# Patient Record
Sex: MALE | Race: WHITE | Employment: FULL TIME | URBAN - NONMETROPOLITAN AREA
[De-identification: names, ages, dates, MRNs, and addresses within clinical notes are randomized per-mention and may not be internally consistent; named-entity substitution may affect disease eponyms.]

---

## 2017-01-26 ENCOUNTER — TELEPHONE (OUTPATIENT)
Dept: NEUROSURGERY | Age: 47
End: 2017-01-26

## 2017-01-27 DIAGNOSIS — M54.2 CERVICAL PAIN: ICD-10-CM

## 2017-01-27 RX ORDER — HYDROCODONE BITARTRATE AND ACETAMINOPHEN 7.5; 325 MG/1; MG/1
1 TABLET ORAL EVERY 8 HOURS PRN
Qty: 15 TABLET | Refills: 0 | Status: SHIPPED | OUTPATIENT
Start: 2017-01-27 | End: 2017-01-31 | Stop reason: SDUPTHER

## 2017-01-31 ENCOUNTER — OFFICE VISIT (OUTPATIENT)
Dept: NEUROSURGERY | Age: 47
End: 2017-01-31
Payer: COMMERCIAL

## 2017-01-31 VITALS
HEART RATE: 91 BPM | WEIGHT: 173 LBS | BODY MASS INDEX: 24.22 KG/M2 | SYSTOLIC BLOOD PRESSURE: 132 MMHG | DIASTOLIC BLOOD PRESSURE: 80 MMHG | HEIGHT: 71 IN | OXYGEN SATURATION: 96 %

## 2017-01-31 DIAGNOSIS — M50.30 DDD (DEGENERATIVE DISC DISEASE), CERVICAL: ICD-10-CM

## 2017-01-31 DIAGNOSIS — M54.2 NECK PAIN: ICD-10-CM

## 2017-01-31 DIAGNOSIS — Z98.1 S/P CERVICAL SPINAL FUSION: ICD-10-CM

## 2017-01-31 DIAGNOSIS — M47.22 CERVICAL SPONDYLOSIS WITH RADICULOPATHY: ICD-10-CM

## 2017-01-31 DIAGNOSIS — M54.2 CERVICAL PAIN: ICD-10-CM

## 2017-01-31 PROCEDURE — 99214 OFFICE O/P EST MOD 30 MIN: CPT | Performed by: NEUROLOGICAL SURGERY

## 2017-01-31 RX ORDER — HYDROCODONE BITARTRATE AND ACETAMINOPHEN 7.5; 325 MG/1; MG/1
1 TABLET ORAL EVERY 8 HOURS PRN
Qty: 30 TABLET | Refills: 0 | Status: SHIPPED | OUTPATIENT
Start: 2017-01-31 | End: 2017-02-20 | Stop reason: SDUPTHER

## 2017-02-15 ENCOUNTER — HOSPITAL ENCOUNTER (OUTPATIENT)
Dept: GENERAL RADIOLOGY | Age: 47
Discharge: HOME | End: 2017-02-15
Payer: COMMERCIAL

## 2017-02-15 ENCOUNTER — HOSPITAL ENCOUNTER (OUTPATIENT)
Dept: PREADMISSION TESTING | Age: 47
Discharge: HOME | End: 2017-02-15
Payer: COMMERCIAL

## 2017-02-15 VITALS — WEIGHT: 166 LBS | HEIGHT: 71 IN | BODY MASS INDEX: 23.24 KG/M2

## 2017-02-15 DIAGNOSIS — M47.22 CERVICAL SPONDYLOSIS WITH RADICULOPATHY: ICD-10-CM

## 2017-02-15 LAB
ALBUMIN SERPL-MCNC: 4.2 G/DL (ref 3.5–5.2)
ALP BLD-CCNC: 81 U/L (ref 40–130)
ALT SERPL-CCNC: 10 U/L (ref 5–41)
ANION GAP SERPL CALCULATED.3IONS-SCNC: 15 MMOL/L (ref 7–19)
APTT: 34.7 SEC (ref 26–36.2)
AST SERPL-CCNC: 13 U/L (ref 5–40)
BILIRUB SERPL-MCNC: 0.4 MG/DL (ref 0.2–1.2)
BILIRUBIN URINE: NEGATIVE
BLOOD, URINE: NEGATIVE
BUN BLDV-MCNC: 5 MG/DL (ref 6–20)
CALCIUM SERPL-MCNC: 9.7 MG/DL (ref 8.6–10)
CHLORIDE BLD-SCNC: 103 MMOL/L (ref 98–111)
CLARITY: CLEAR
CO2: 23 MMOL/L (ref 22–29)
COLOR: YELLOW
CREAT SERPL-MCNC: 0.7 MG/DL (ref 0.5–1.2)
EKG P AXIS: 59 DEGREES
EKG P-R INTERVAL: 120 MS
EKG Q-T INTERVAL: 354 MS
EKG QRS DURATION: 88 MS
EKG QTC CALCULATION (BAZETT): 394 MS
EKG T AXIS: 52 DEGREES
GFR NON-AFRICAN AMERICAN: >60
GLOBULIN: 3.1 G/DL
GLUCOSE BLD-MCNC: 87 MG/DL (ref 74–109)
GLUCOSE URINE: NEGATIVE MG/DL
HCT VFR BLD CALC: 45.4 % (ref 42–52)
HEMOGLOBIN: 15.3 G/DL (ref 14–18)
INR BLD: 0.92 (ref 0.88–1.18)
KETONES, URINE: NEGATIVE MG/DL
LEUKOCYTE ESTERASE, URINE: NEGATIVE
MCH RBC QN AUTO: 30.6 PG (ref 27–31)
MCHC RBC AUTO-ENTMCNC: 33.7 G/DL (ref 33–37)
MCV RBC AUTO: 90.8 FL (ref 80–94)
NITRITE, URINE: NEGATIVE
PDW BLD-RTO: 13.4 % (ref 11.5–14.5)
PH UA: 6
PLATELET # BLD: 299 K/UL (ref 130–400)
PMV BLD AUTO: 10.3 FL (ref 7.4–10.4)
POTASSIUM SERPL-SCNC: 4 MMOL/L (ref 3.5–5)
PROTEIN UA: NEGATIVE MG/DL
PROTHROMBIN TIME: 12.4 SEC (ref 12–14.6)
RBC # BLD: 5 M/UL (ref 4.7–6.1)
SODIUM BLD-SCNC: 141 MMOL/L (ref 136–145)
SPECIFIC GRAVITY UA: 1.01
TOTAL PROTEIN: 7.3 G/DL (ref 6.6–8.7)
UROBILINOGEN, URINE: 0.2 E.U./DL
WBC # BLD: 8.5 K/UL (ref 4.8–10.8)

## 2017-02-15 PROCEDURE — 80053 COMPREHEN METABOLIC PANEL: CPT

## 2017-02-15 PROCEDURE — 85730 THROMBOPLASTIN TIME PARTIAL: CPT

## 2017-02-15 PROCEDURE — 71020 XR CHEST STANDARD TWO VW: CPT

## 2017-02-15 PROCEDURE — 81003 URINALYSIS AUTO W/O SCOPE: CPT

## 2017-02-15 PROCEDURE — 85027 COMPLETE CBC AUTOMATED: CPT

## 2017-02-15 PROCEDURE — 85610 PROTHROMBIN TIME: CPT

## 2017-02-15 PROCEDURE — 93005 ELECTROCARDIOGRAM TRACING: CPT

## 2017-02-16 ENCOUNTER — TELEPHONE (OUTPATIENT)
Dept: NEUROSURGERY | Age: 47
End: 2017-02-16

## 2017-02-20 DIAGNOSIS — M54.2 CERVICAL PAIN: ICD-10-CM

## 2017-02-20 RX ORDER — HYDROCODONE BITARTRATE AND ACETAMINOPHEN 7.5; 325 MG/1; MG/1
1 TABLET ORAL EVERY 8 HOURS PRN
Qty: 30 TABLET | Refills: 0 | Status: ON HOLD | OUTPATIENT
Start: 2017-02-20 | End: 2017-03-02 | Stop reason: HOSPADM

## 2017-03-01 ENCOUNTER — ANESTHESIA EVENT (OUTPATIENT)
Dept: OPERATING ROOM | Age: 47
DRG: 473 | End: 2017-03-01
Payer: COMMERCIAL

## 2017-03-01 ENCOUNTER — HOSPITAL ENCOUNTER (INPATIENT)
Age: 47
LOS: 1 days | Discharge: HOME | DRG: 473 | End: 2017-03-02
Attending: NEUROLOGICAL SURGERY | Admitting: NEUROLOGICAL SURGERY
Payer: COMMERCIAL

## 2017-03-01 ENCOUNTER — ANESTHESIA (OUTPATIENT)
Dept: OPERATING ROOM | Age: 47
DRG: 473 | End: 2017-03-01
Payer: COMMERCIAL

## 2017-03-01 ENCOUNTER — APPOINTMENT (OUTPATIENT)
Dept: GENERAL RADIOLOGY | Age: 47
DRG: 473 | End: 2017-03-01
Attending: NEUROLOGICAL SURGERY
Payer: COMMERCIAL

## 2017-03-01 ENCOUNTER — SURGERY (OUTPATIENT)
Age: 47
End: 2017-03-01

## 2017-03-01 VITALS
DIASTOLIC BLOOD PRESSURE: 85 MMHG | RESPIRATION RATE: 13 BRPM | TEMPERATURE: 98.2 F | SYSTOLIC BLOOD PRESSURE: 150 MMHG | OXYGEN SATURATION: 100 %

## 2017-03-01 LAB
ABO/RH: NORMAL
ANTIBODY SCREEN: NORMAL

## 2017-03-01 PROCEDURE — 22853 INSJ BIOMECHANICAL DEVICE: CPT | Performed by: NEUROLOGICAL SURGERY

## 2017-03-01 PROCEDURE — 20936 SP BONE AGRFT LOCAL ADD-ON: CPT | Performed by: NEUROLOGICAL SURGERY

## 2017-03-01 PROCEDURE — 2720000000 HC MISC SURG SUPPLY STERILE $0-50: Performed by: NEUROLOGICAL SURGERY

## 2017-03-01 PROCEDURE — 3600000005 HC SURGERY LEVEL 5 BASE: Performed by: NEUROLOGICAL SURGERY

## 2017-03-01 PROCEDURE — 86901 BLOOD TYPING SEROLOGIC RH(D): CPT

## 2017-03-01 PROCEDURE — 2700000000 HC OXYGEN THERAPY PER DAY

## 2017-03-01 PROCEDURE — L8699 PROSTHETIC IMPLANT NOS: HCPCS | Performed by: NEUROLOGICAL SURGERY

## 2017-03-01 PROCEDURE — 6370000000 HC RX 637 (ALT 250 FOR IP): Performed by: NEUROLOGICAL SURGERY

## 2017-03-01 PROCEDURE — 86850 RBC ANTIBODY SCREEN: CPT

## 2017-03-01 PROCEDURE — 2720000001 HC MISC SURG SUPPLY STERILE $51-500: Performed by: NEUROLOGICAL SURGERY

## 2017-03-01 PROCEDURE — 36415 COLL VENOUS BLD VENIPUNCTURE: CPT

## 2017-03-01 PROCEDURE — C1729 CATH, DRAINAGE: HCPCS | Performed by: NEUROLOGICAL SURGERY

## 2017-03-01 PROCEDURE — 6360000002 HC RX W HCPCS

## 2017-03-01 PROCEDURE — 2580000003 HC RX 258: Performed by: NEUROLOGICAL SURGERY

## 2017-03-01 PROCEDURE — 72040 X-RAY EXAM NECK SPINE 2-3 VW: CPT

## 2017-03-01 PROCEDURE — 2580000003 HC RX 258

## 2017-03-01 PROCEDURE — 1210000000 HC MED SURG R&B

## 2017-03-01 PROCEDURE — 94664 DEMO&/EVAL PT USE INHALER: CPT

## 2017-03-01 PROCEDURE — 7100000000 HC PACU RECOVERY - FIRST 15 MIN: Performed by: NEUROLOGICAL SURGERY

## 2017-03-01 PROCEDURE — 2500000003 HC RX 250 WO HCPCS

## 2017-03-01 PROCEDURE — 22845 INSERT SPINE FIXATION DEVICE: CPT | Performed by: NEUROLOGICAL SURGERY

## 2017-03-01 PROCEDURE — 6360000002 HC RX W HCPCS: Performed by: ANESTHESIOLOGY

## 2017-03-01 PROCEDURE — 6360000002 HC RX W HCPCS: Performed by: NEUROLOGICAL SURGERY

## 2017-03-01 PROCEDURE — 0RB30ZZ EXCISION OF CERVICAL VERTEBRAL DISC, OPEN APPROACH: ICD-10-PCS | Performed by: NEUROLOGICAL SURGERY

## 2017-03-01 PROCEDURE — 6360000002 HC RX W HCPCS: Performed by: NURSE ANESTHETIST, CERTIFIED REGISTERED

## 2017-03-01 PROCEDURE — 3700000001 HC ADD 15 MINUTES (ANESTHESIA): Performed by: NEUROLOGICAL SURGERY

## 2017-03-01 PROCEDURE — 3600000015 HC SURGERY LEVEL 5 ADDTL 15MIN: Performed by: NEUROLOGICAL SURGERY

## 2017-03-01 PROCEDURE — C1713 ANCHOR/SCREW BN/BN,TIS/BN: HCPCS | Performed by: NEUROLOGICAL SURGERY

## 2017-03-01 PROCEDURE — 22554 ARTHRD ANT NTRBD MIN DSC CRV: CPT | Performed by: NEUROLOGICAL SURGERY

## 2017-03-01 PROCEDURE — 0RG20A0 FUSION OF 2 OR MORE CERVICAL VERTEBRAL JOINTS WITH INTERBODY FUSION DEVICE, ANTERIOR APPROACH, ANTERIOR COLUMN, OPEN APPROACH: ICD-10-PCS | Performed by: NEUROLOGICAL SURGERY

## 2017-03-01 PROCEDURE — 3209999900 FLUORO FOR SURGICAL PROCEDURES

## 2017-03-01 PROCEDURE — 2500000003 HC RX 250 WO HCPCS: Performed by: NEUROLOGICAL SURGERY

## 2017-03-01 PROCEDURE — 3700000000 HC ANESTHESIA ATTENDED CARE: Performed by: NEUROLOGICAL SURGERY

## 2017-03-01 PROCEDURE — 86900 BLOOD TYPING SEROLOGIC ABO: CPT

## 2017-03-01 PROCEDURE — 7100000001 HC PACU RECOVERY - ADDTL 15 MIN: Performed by: NEUROLOGICAL SURGERY

## 2017-03-01 PROCEDURE — 2580000003 HC RX 258: Performed by: NURSE ANESTHETIST, CERTIFIED REGISTERED

## 2017-03-01 PROCEDURE — 22585 ARTHRD ANT NTRBD MIN DSC EA: CPT | Performed by: NEUROLOGICAL SURGERY

## 2017-03-01 DEVICE — SCREW 7713515 ZEVO VAR SD 3.5MM X 15MM
Type: IMPLANTABLE DEVICE | Site: SPINE CERVICAL | Status: FUNCTIONAL
Brand: ZEVO™ ANTERIOR CERVICAL PLATE SYSTEM

## 2017-03-01 DEVICE — IMPLANTABLE DEVICE: Type: IMPLANTABLE DEVICE | Site: SPINE CERVICAL | Status: FUNCTIONAL

## 2017-03-01 DEVICE — GRAFT HUM TISS W14XH7XL16MM ALLGRFT ANAT CORNERSTONE: Type: IMPLANTABLE DEVICE | Site: SPINE CERVICAL | Status: FUNCTIONAL

## 2017-03-01 DEVICE — PIN 3030003 ZEVO PLATE HOLDING PIN: Type: IMPLANTABLE DEVICE | Site: SPINE CERVICAL | Status: FUNCTIONAL

## 2017-03-01 RX ORDER — ONDANSETRON 2 MG/ML
INJECTION INTRAMUSCULAR; INTRAVENOUS PRN
Status: DISCONTINUED | OUTPATIENT
Start: 2017-03-01 | End: 2017-03-01 | Stop reason: SDUPTHER

## 2017-03-01 RX ORDER — MIDAZOLAM HYDROCHLORIDE 1 MG/ML
2 INJECTION INTRAMUSCULAR; INTRAVENOUS ONCE
Status: COMPLETED | OUTPATIENT
Start: 2017-03-01 | End: 2017-03-01

## 2017-03-01 RX ORDER — ACETAMINOPHEN 500 MG
500 TABLET ORAL EVERY 6 HOURS PRN
Status: DISCONTINUED | OUTPATIENT
Start: 2017-03-01 | End: 2017-03-02

## 2017-03-01 RX ORDER — DOCUSATE SODIUM 100 MG/1
100 CAPSULE, LIQUID FILLED ORAL 2 TIMES DAILY
Status: DISCONTINUED | OUTPATIENT
Start: 2017-03-01 | End: 2017-03-02 | Stop reason: HOSPADM

## 2017-03-01 RX ORDER — FAMOTIDINE 20 MG/1
20 TABLET, FILM COATED ORAL DAILY PRN
Status: DISCONTINUED | OUTPATIENT
Start: 2017-03-01 | End: 2017-03-02 | Stop reason: HOSPADM

## 2017-03-01 RX ORDER — LIDOCAINE HYDROCHLORIDE 10 MG/ML
INJECTION, SOLUTION EPIDURAL; INFILTRATION; INTRACAUDAL; PERINEURAL PRN
Status: DISCONTINUED | OUTPATIENT
Start: 2017-03-01 | End: 2017-03-01 | Stop reason: SDUPTHER

## 2017-03-01 RX ORDER — DEXAMETHASONE SODIUM PHOSPHATE 10 MG/ML
INJECTION INTRAMUSCULAR; INTRAVENOUS PRN
Status: DISCONTINUED | OUTPATIENT
Start: 2017-03-01 | End: 2017-03-01 | Stop reason: SDUPTHER

## 2017-03-01 RX ORDER — SODIUM CHLORIDE 9 MG/ML
INJECTION, SOLUTION INTRAVENOUS CONTINUOUS
Status: DISCONTINUED | OUTPATIENT
Start: 2017-03-01 | End: 2017-03-02 | Stop reason: HOSPADM

## 2017-03-01 RX ORDER — MIDAZOLAM HYDROCHLORIDE 1 MG/ML
INJECTION INTRAMUSCULAR; INTRAVENOUS PRN
Status: DISCONTINUED | OUTPATIENT
Start: 2017-03-01 | End: 2017-03-01 | Stop reason: SDUPTHER

## 2017-03-01 RX ORDER — VANCOMYCIN HYDROCHLORIDE 1 G/200ML
1000 INJECTION, SOLUTION INTRAVENOUS
Status: COMPLETED | OUTPATIENT
Start: 2017-03-01 | End: 2017-03-01

## 2017-03-01 RX ORDER — MEPERIDINE HYDROCHLORIDE 25 MG/ML
12.5 INJECTION INTRAMUSCULAR; INTRAVENOUS; SUBCUTANEOUS EVERY 5 MIN PRN
Status: DISCONTINUED | OUTPATIENT
Start: 2017-03-01 | End: 2017-03-01 | Stop reason: HOSPADM

## 2017-03-01 RX ORDER — SODIUM CHLORIDE 0.9 % (FLUSH) 0.9 %
10 SYRINGE (ML) INJECTION EVERY 12 HOURS SCHEDULED
Status: DISCONTINUED | OUTPATIENT
Start: 2017-03-01 | End: 2017-03-02 | Stop reason: HOSPADM

## 2017-03-01 RX ORDER — DIPHENHYDRAMINE HYDROCHLORIDE 50 MG/ML
12.5 INJECTION INTRAMUSCULAR; INTRAVENOUS
Status: DISCONTINUED | OUTPATIENT
Start: 2017-03-01 | End: 2017-03-01 | Stop reason: HOSPADM

## 2017-03-01 RX ORDER — ONDANSETRON 2 MG/ML
4 INJECTION INTRAMUSCULAR; INTRAVENOUS EVERY 6 HOURS PRN
Status: DISCONTINUED | OUTPATIENT
Start: 2017-03-01 | End: 2017-03-02 | Stop reason: HOSPADM

## 2017-03-01 RX ORDER — PROMETHAZINE HYDROCHLORIDE 25 MG/ML
6.25 INJECTION, SOLUTION INTRAMUSCULAR; INTRAVENOUS
Status: DISCONTINUED | OUTPATIENT
Start: 2017-03-01 | End: 2017-03-01 | Stop reason: HOSPADM

## 2017-03-01 RX ORDER — SODIUM CHLORIDE 0.9 % (FLUSH) 0.9 %
10 SYRINGE (ML) INJECTION PRN
Status: DISCONTINUED | OUTPATIENT
Start: 2017-03-01 | End: 2017-03-01 | Stop reason: HOSPADM

## 2017-03-01 RX ORDER — CYCLOBENZAPRINE HCL 10 MG
10 TABLET ORAL 3 TIMES DAILY PRN
Status: DISCONTINUED | OUTPATIENT
Start: 2017-03-01 | End: 2017-03-02 | Stop reason: HOSPADM

## 2017-03-01 RX ORDER — OXYCODONE HYDROCHLORIDE AND ACETAMINOPHEN 5; 325 MG/1; MG/1
2 TABLET ORAL EVERY 4 HOURS PRN
Status: DISCONTINUED | OUTPATIENT
Start: 2017-03-01 | End: 2017-03-02

## 2017-03-01 RX ORDER — PROPOFOL 10 MG/ML
INJECTION, EMULSION INTRAVENOUS PRN
Status: DISCONTINUED | OUTPATIENT
Start: 2017-03-01 | End: 2017-03-01 | Stop reason: SDUPTHER

## 2017-03-01 RX ORDER — OXYCODONE HYDROCHLORIDE AND ACETAMINOPHEN 5; 325 MG/1; MG/1
1 TABLET ORAL EVERY 4 HOURS PRN
Status: DISCONTINUED | OUTPATIENT
Start: 2017-03-01 | End: 2017-03-02

## 2017-03-01 RX ORDER — MORPHINE SULFATE 4 MG/ML
4 INJECTION, SOLUTION INTRAMUSCULAR; INTRAVENOUS
Status: DISCONTINUED | OUTPATIENT
Start: 2017-03-01 | End: 2017-03-02 | Stop reason: HOSPADM

## 2017-03-01 RX ORDER — SODIUM CHLORIDE, SODIUM LACTATE, POTASSIUM CHLORIDE, CALCIUM CHLORIDE 600; 310; 30; 20 MG/100ML; MG/100ML; MG/100ML; MG/100ML
INJECTION, SOLUTION INTRAVENOUS CONTINUOUS
Status: DISCONTINUED | OUTPATIENT
Start: 2017-03-01 | End: 2017-03-01

## 2017-03-01 RX ORDER — SUCCINYLCHOLINE CHLORIDE 20 MG/ML
INJECTION INTRAMUSCULAR; INTRAVENOUS PRN
Status: DISCONTINUED | OUTPATIENT
Start: 2017-03-01 | End: 2017-03-01 | Stop reason: SDUPTHER

## 2017-03-01 RX ORDER — SODIUM CHLORIDE 0.9 % (FLUSH) 0.9 %
10 SYRINGE (ML) INJECTION PRN
Status: DISCONTINUED | OUTPATIENT
Start: 2017-03-01 | End: 2017-03-02 | Stop reason: HOSPADM

## 2017-03-01 RX ORDER — SODIUM CHLORIDE, SODIUM LACTATE, POTASSIUM CHLORIDE, CALCIUM CHLORIDE 600; 310; 30; 20 MG/100ML; MG/100ML; MG/100ML; MG/100ML
INJECTION, SOLUTION INTRAVENOUS CONTINUOUS PRN
Status: DISCONTINUED | OUTPATIENT
Start: 2017-03-01 | End: 2017-03-01 | Stop reason: SDUPTHER

## 2017-03-01 RX ORDER — FENTANYL CITRATE 50 UG/ML
INJECTION, SOLUTION INTRAMUSCULAR; INTRAVENOUS PRN
Status: DISCONTINUED | OUTPATIENT
Start: 2017-03-01 | End: 2017-03-01 | Stop reason: SDUPTHER

## 2017-03-01 RX ORDER — SODIUM CHLORIDE 0.9 % (FLUSH) 0.9 %
10 SYRINGE (ML) INJECTION EVERY 12 HOURS SCHEDULED
Status: DISCONTINUED | OUTPATIENT
Start: 2017-03-01 | End: 2017-03-01 | Stop reason: HOSPADM

## 2017-03-01 RX ORDER — HYDRALAZINE HYDROCHLORIDE 20 MG/ML
5 INJECTION INTRAMUSCULAR; INTRAVENOUS EVERY 10 MIN PRN
Status: DISCONTINUED | OUTPATIENT
Start: 2017-03-01 | End: 2017-03-01 | Stop reason: HOSPADM

## 2017-03-01 RX ORDER — MORPHINE SULFATE 4 MG/ML
4 INJECTION, SOLUTION INTRAMUSCULAR; INTRAVENOUS EVERY 5 MIN PRN
Status: DISCONTINUED | OUTPATIENT
Start: 2017-03-01 | End: 2017-03-01 | Stop reason: HOSPADM

## 2017-03-01 RX ORDER — LABETALOL HYDROCHLORIDE 5 MG/ML
5 INJECTION, SOLUTION INTRAVENOUS EVERY 10 MIN PRN
Status: DISCONTINUED | OUTPATIENT
Start: 2017-03-01 | End: 2017-03-01 | Stop reason: HOSPADM

## 2017-03-01 RX ORDER — MORPHINE SULFATE 4 MG/ML
2 INJECTION, SOLUTION INTRAMUSCULAR; INTRAVENOUS EVERY 5 MIN PRN
Status: DISCONTINUED | OUTPATIENT
Start: 2017-03-01 | End: 2017-03-01 | Stop reason: HOSPADM

## 2017-03-01 RX ORDER — PROPOFOL 10 MG/ML
INJECTION, EMULSION INTRAVENOUS CONTINUOUS PRN
Status: DISCONTINUED | OUTPATIENT
Start: 2017-03-01 | End: 2017-03-01 | Stop reason: SDUPTHER

## 2017-03-01 RX ORDER — ENALAPRILAT 2.5 MG/2ML
1.25 INJECTION INTRAVENOUS
Status: DISCONTINUED | OUTPATIENT
Start: 2017-03-01 | End: 2017-03-01 | Stop reason: HOSPADM

## 2017-03-01 RX ORDER — ACETAMINOPHEN 325 MG/1
650 TABLET ORAL EVERY 4 HOURS PRN
Status: DISCONTINUED | OUTPATIENT
Start: 2017-03-01 | End: 2017-03-02 | Stop reason: HOSPADM

## 2017-03-01 RX ORDER — METOCLOPRAMIDE HYDROCHLORIDE 5 MG/ML
10 INJECTION INTRAMUSCULAR; INTRAVENOUS
Status: DISCONTINUED | OUTPATIENT
Start: 2017-03-01 | End: 2017-03-01 | Stop reason: HOSPADM

## 2017-03-01 RX ORDER — MORPHINE SULFATE 4 MG/ML
2 INJECTION, SOLUTION INTRAMUSCULAR; INTRAVENOUS
Status: DISCONTINUED | OUTPATIENT
Start: 2017-03-01 | End: 2017-03-02 | Stop reason: HOSPADM

## 2017-03-01 RX ORDER — MORPHINE SULFATE 10 MG/ML
INJECTION, SOLUTION INTRAMUSCULAR; INTRAVENOUS PRN
Status: DISCONTINUED | OUTPATIENT
Start: 2017-03-01 | End: 2017-03-01 | Stop reason: SDUPTHER

## 2017-03-01 RX ADMIN — HYDROMORPHONE HYDROCHLORIDE 0.5 MG: 1 INJECTION, SOLUTION INTRAMUSCULAR; INTRAVENOUS; SUBCUTANEOUS at 12:27

## 2017-03-01 RX ADMIN — Medication 10 ML: at 21:54

## 2017-03-01 RX ADMIN — ONDANSETRON HYDROCHLORIDE 4 MG: 2 INJECTION, SOLUTION INTRAVENOUS at 11:15

## 2017-03-01 RX ADMIN — DEXAMETHASONE SODIUM PHOSPHATE 10 MG: 10 INJECTION INTRAMUSCULAR; INTRAVENOUS at 08:15

## 2017-03-01 RX ADMIN — OXYCODONE HYDROCHLORIDE AND ACETAMINOPHEN 2 TABLET: 5; 325 TABLET ORAL at 17:26

## 2017-03-01 RX ADMIN — MIDAZOLAM 2 MG: 1 INJECTION INTRAMUSCULAR; INTRAVENOUS at 06:55

## 2017-03-01 RX ADMIN — MORPHINE SULFATE 4 MG: 4 INJECTION, SOLUTION INTRAMUSCULAR; INTRAVENOUS at 13:16

## 2017-03-01 RX ADMIN — MORPHINE SULFATE 5 MG: 10 INJECTION INTRAMUSCULAR; INTRAVENOUS; SUBCUTANEOUS at 11:27

## 2017-03-01 RX ADMIN — CYCLOBENZAPRINE HYDROCHLORIDE 10 MG: 10 TABLET, FILM COATED ORAL at 23:06

## 2017-03-01 RX ADMIN — DOCUSATE SODIUM 100 MG: 100 CAPSULE, LIQUID FILLED ORAL at 21:53

## 2017-03-01 RX ADMIN — OXYCODONE HYDROCHLORIDE AND ACETAMINOPHEN 2 TABLET: 5; 325 TABLET ORAL at 21:53

## 2017-03-01 RX ADMIN — MIDAZOLAM HYDROCHLORIDE 2 MG: 1 INJECTION, SOLUTION INTRAMUSCULAR; INTRAVENOUS at 07:40

## 2017-03-01 RX ADMIN — FENTANYL CITRATE 50 MCG: 50 INJECTION INTRAMUSCULAR; INTRAVENOUS at 09:15

## 2017-03-01 RX ADMIN — SODIUM CHLORIDE 1000 ML: 900 IRRIGANT IRRIGATION at 08:59

## 2017-03-01 RX ADMIN — DOCUSATE SODIUM 100 MG: 100 CAPSULE, LIQUID FILLED ORAL at 13:15

## 2017-03-01 RX ADMIN — HYDROMORPHONE HYDROCHLORIDE 0.5 MG: 1 INJECTION, SOLUTION INTRAMUSCULAR; INTRAVENOUS; SUBCUTANEOUS at 12:17

## 2017-03-01 RX ADMIN — PROPOFOL 160 MG: 10 INJECTION, EMULSION INTRAVENOUS at 07:51

## 2017-03-01 RX ADMIN — SUCCINYLCHOLINE CHLORIDE 140 MG: 20 INJECTION, SOLUTION INTRAMUSCULAR; INTRAVENOUS; PARENTERAL at 07:51

## 2017-03-01 RX ADMIN — HYDROMORPHONE HYDROCHLORIDE 0.5 MG: 1 INJECTION, SOLUTION INTRAMUSCULAR; INTRAVENOUS; SUBCUTANEOUS at 12:10

## 2017-03-01 RX ADMIN — HYDROMORPHONE HYDROCHLORIDE 0.5 MG: 1 INJECTION, SOLUTION INTRAMUSCULAR; INTRAVENOUS; SUBCUTANEOUS at 12:32

## 2017-03-01 RX ADMIN — PROPOFOL 100 MCG/KG/MIN: 10 INJECTION, EMULSION INTRAVENOUS at 07:55

## 2017-03-01 RX ADMIN — FENTANYL CITRATE 50 MCG: 50 INJECTION INTRAMUSCULAR; INTRAVENOUS at 08:54

## 2017-03-01 RX ADMIN — THROMBIN, TOPICAL (BOVINE) 20000 UNITS: KIT at 09:39

## 2017-03-01 RX ADMIN — SODIUM CHLORIDE, SODIUM LACTATE, POTASSIUM CHLORIDE, AND CALCIUM CHLORIDE: 600; 310; 30; 20 INJECTION, SOLUTION INTRAVENOUS at 09:30

## 2017-03-01 RX ADMIN — MORPHINE SULFATE 4 MG: 4 INJECTION, SOLUTION INTRAMUSCULAR; INTRAVENOUS at 16:16

## 2017-03-01 RX ADMIN — SODIUM CHLORIDE, POTASSIUM CHLORIDE, SODIUM LACTATE AND CALCIUM CHLORIDE: 600; 310; 30; 20 INJECTION, SOLUTION INTRAVENOUS at 06:15

## 2017-03-01 RX ADMIN — MORPHINE SULFATE 4 MG: 4 INJECTION, SOLUTION INTRAMUSCULAR; INTRAVENOUS at 18:34

## 2017-03-01 RX ADMIN — MORPHINE SULFATE 4 MG: 4 INJECTION, SOLUTION INTRAMUSCULAR; INTRAVENOUS at 23:06

## 2017-03-01 RX ADMIN — REMIFENTANIL HYDROCHLORIDE 0.38 MCG/KG/MIN: 1 INJECTION, POWDER, LYOPHILIZED, FOR SOLUTION INTRAVENOUS at 07:55

## 2017-03-01 RX ADMIN — SODIUM CHLORIDE: 9 INJECTION, SOLUTION INTRAVENOUS at 13:18

## 2017-03-01 RX ADMIN — MORPHINE SULFATE 5 MG: 10 INJECTION INTRAMUSCULAR; INTRAVENOUS; SUBCUTANEOUS at 11:40

## 2017-03-01 RX ADMIN — LIDOCAINE HYDROCHLORIDE 100 MG: 10 INJECTION, SOLUTION EPIDURAL; INFILTRATION; INTRACAUDAL; PERINEURAL at 07:51

## 2017-03-01 RX ADMIN — VANCOMYCIN HYDROCHLORIDE 1000 MG: 1 INJECTION, SOLUTION INTRAVENOUS at 06:51

## 2017-03-01 RX ADMIN — FENTANYL CITRATE 50 MCG: 50 INJECTION INTRAMUSCULAR; INTRAVENOUS at 11:00

## 2017-03-01 RX ADMIN — FENTANYL CITRATE 100 MCG: 50 INJECTION INTRAMUSCULAR; INTRAVENOUS at 07:45

## 2017-03-01 ASSESSMENT — PAIN SCALES - GENERAL
PAINLEVEL_OUTOF10: 9
PAINLEVEL_OUTOF10: 7
PAINLEVEL_OUTOF10: 9
PAINLEVEL_OUTOF10: 7
PAINLEVEL_OUTOF10: 6
PAINLEVEL_OUTOF10: 7
PAINLEVEL_OUTOF10: 7
PAINLEVEL_OUTOF10: 5
PAINLEVEL_OUTOF10: 10
PAINLEVEL_OUTOF10: 10
PAINLEVEL_OUTOF10: 8

## 2017-03-02 VITALS
SYSTOLIC BLOOD PRESSURE: 125 MMHG | HEIGHT: 71 IN | HEART RATE: 84 BPM | TEMPERATURE: 99.8 F | RESPIRATION RATE: 16 BRPM | WEIGHT: 166 LBS | BODY MASS INDEX: 23.24 KG/M2 | DIASTOLIC BLOOD PRESSURE: 78 MMHG | OXYGEN SATURATION: 97 %

## 2017-03-02 PROBLEM — M47.22 OSTEOARTHRITIS OF SPINE WITH RADICULOPATHY, CERVICAL REGION: Chronic | Status: ACTIVE | Noted: 2017-03-02

## 2017-03-02 LAB
ALBUMIN SERPL-MCNC: 3.6 G/DL (ref 3.5–5.2)
ALP BLD-CCNC: 62 U/L (ref 40–130)
ALT SERPL-CCNC: 11 U/L (ref 5–41)
ANION GAP SERPL CALCULATED.3IONS-SCNC: 13 MMOL/L (ref 7–19)
AST SERPL-CCNC: 14 U/L (ref 5–40)
BILIRUB SERPL-MCNC: 0.5 MG/DL (ref 0.2–1.2)
BUN BLDV-MCNC: 7 MG/DL (ref 6–20)
CALCIUM SERPL-MCNC: 8.9 MG/DL (ref 8.6–10)
CHLORIDE BLD-SCNC: 101 MMOL/L (ref 98–111)
CO2: 23 MMOL/L (ref 22–29)
CREAT SERPL-MCNC: 0.6 MG/DL (ref 0.5–1.2)
GFR NON-AFRICAN AMERICAN: >60
GLOBULIN: 2.8 G/DL
GLUCOSE BLD-MCNC: 122 MG/DL (ref 74–109)
HCT VFR BLD CALC: 42.3 % (ref 42–52)
HEMOGLOBIN: 14 G/DL (ref 14–18)
MCH RBC QN AUTO: 30.5 PG (ref 27–31)
MCHC RBC AUTO-ENTMCNC: 33.1 G/DL (ref 33–37)
MCV RBC AUTO: 92.2 FL (ref 80–94)
PDW BLD-RTO: 13.1 % (ref 11.5–14.5)
PLATELET # BLD: 276 K/UL (ref 130–400)
PMV BLD AUTO: 10.3 FL (ref 7.4–10.4)
POTASSIUM SERPL-SCNC: 4.1 MMOL/L (ref 3.5–5)
RBC # BLD: 4.59 M/UL (ref 4.7–6.1)
SODIUM BLD-SCNC: 137 MMOL/L (ref 136–145)
TOTAL PROTEIN: 6.4 G/DL (ref 6.6–8.7)
WBC # BLD: 19.3 K/UL (ref 4.8–10.8)

## 2017-03-02 PROCEDURE — G8978 MOBILITY CURRENT STATUS: HCPCS

## 2017-03-02 PROCEDURE — 80053 COMPREHEN METABOLIC PANEL: CPT

## 2017-03-02 PROCEDURE — 97161 PT EVAL LOW COMPLEX 20 MIN: CPT

## 2017-03-02 PROCEDURE — G8980 MOBILITY D/C STATUS: HCPCS

## 2017-03-02 PROCEDURE — 85027 COMPLETE CBC AUTOMATED: CPT

## 2017-03-02 PROCEDURE — 6370000000 HC RX 637 (ALT 250 FOR IP): Performed by: NEUROLOGICAL SURGERY

## 2017-03-02 PROCEDURE — 99024 POSTOP FOLLOW-UP VISIT: CPT | Performed by: NEUROLOGICAL SURGERY

## 2017-03-02 PROCEDURE — 6370000000 HC RX 637 (ALT 250 FOR IP): Performed by: NURSE PRACTITIONER

## 2017-03-02 PROCEDURE — 6360000002 HC RX W HCPCS: Performed by: NEUROLOGICAL SURGERY

## 2017-03-02 PROCEDURE — G8979 MOBILITY GOAL STATUS: HCPCS

## 2017-03-02 PROCEDURE — 99024 POSTOP FOLLOW-UP VISIT: CPT | Performed by: NURSE PRACTITIONER

## 2017-03-02 PROCEDURE — 36415 COLL VENOUS BLD VENIPUNCTURE: CPT

## 2017-03-02 PROCEDURE — 2580000003 HC RX 258: Performed by: NEUROLOGICAL SURGERY

## 2017-03-02 PROCEDURE — 2700000000 HC OXYGEN THERAPY PER DAY

## 2017-03-02 RX ORDER — OXYCODONE AND ACETAMINOPHEN 10; 325 MG/1; MG/1
1 TABLET ORAL EVERY 4 HOURS PRN
Status: DISCONTINUED | OUTPATIENT
Start: 2017-03-02 | End: 2017-03-02 | Stop reason: HOSPADM

## 2017-03-02 RX ORDER — OXYCODONE AND ACETAMINOPHEN 10; 325 MG/1; MG/1
2 TABLET ORAL EVERY 4 HOURS PRN
Status: DISCONTINUED | OUTPATIENT
Start: 2017-03-02 | End: 2017-03-02 | Stop reason: HOSPADM

## 2017-03-02 RX ORDER — ONDANSETRON 4 MG/1
4 TABLET, FILM COATED ORAL DAILY PRN
Qty: 15 TABLET | Refills: 0 | Status: SHIPPED | OUTPATIENT
Start: 2017-03-02 | End: 2017-08-14

## 2017-03-02 RX ORDER — OXYCODONE AND ACETAMINOPHEN 10; 325 MG/1; MG/1
2 TABLET ORAL EVERY 4 HOURS PRN
Qty: 120 TABLET | Refills: 0 | Status: SHIPPED | OUTPATIENT
Start: 2017-03-02 | End: 2017-03-14 | Stop reason: SDUPTHER

## 2017-03-02 RX ORDER — PSEUDOEPHEDRINE HCL 30 MG
100 TABLET ORAL 2 TIMES DAILY PRN
Qty: 30 CAPSULE | Refills: 1 | Status: SHIPPED | OUTPATIENT
Start: 2017-03-02 | End: 2017-08-14

## 2017-03-02 RX ADMIN — MORPHINE SULFATE 4 MG: 4 INJECTION, SOLUTION INTRAMUSCULAR; INTRAVENOUS at 03:35

## 2017-03-02 RX ADMIN — OXYCODONE HYDROCHLORIDE AND ACETAMINOPHEN 1 TABLET: 10; 325 TABLET ORAL at 06:56

## 2017-03-02 RX ADMIN — Medication 10 ML: at 08:11

## 2017-03-02 RX ADMIN — DOCUSATE SODIUM 100 MG: 100 CAPSULE, LIQUID FILLED ORAL at 08:11

## 2017-03-02 RX ADMIN — OXYCODONE HYDROCHLORIDE AND ACETAMINOPHEN 2 TABLET: 10; 325 TABLET ORAL at 09:36

## 2017-03-02 ASSESSMENT — PAIN SCALES - GENERAL
PAINLEVEL_OUTOF10: 7
PAINLEVEL_OUTOF10: 8

## 2017-03-02 ASSESSMENT — PAIN SCALES - WONG BAKER: WONGBAKER_NUMERICALRESPONSE: 2

## 2017-03-02 ASSESSMENT — PAIN DESCRIPTION - LOCATION: LOCATION: NECK

## 2017-03-02 ASSESSMENT — PAIN DESCRIPTION - PAIN TYPE: TYPE: SURGICAL PAIN

## 2017-03-14 ENCOUNTER — OFFICE VISIT (OUTPATIENT)
Dept: NEUROSURGERY | Age: 47
End: 2017-03-14

## 2017-03-14 VITALS
HEART RATE: 104 BPM | HEIGHT: 70 IN | BODY MASS INDEX: 25.05 KG/M2 | OXYGEN SATURATION: 100 % | SYSTOLIC BLOOD PRESSURE: 143 MMHG | DIASTOLIC BLOOD PRESSURE: 95 MMHG | WEIGHT: 175 LBS

## 2017-03-14 DIAGNOSIS — Z98.1 S/P CERVICAL SPINAL FUSION: Primary | ICD-10-CM

## 2017-03-14 PROCEDURE — 99024 POSTOP FOLLOW-UP VISIT: CPT | Performed by: NEUROLOGICAL SURGERY

## 2017-03-14 RX ORDER — OXYCODONE AND ACETAMINOPHEN 10; 325 MG/1; MG/1
2 TABLET ORAL EVERY 4 HOURS PRN
Qty: 120 TABLET | Refills: 0 | Status: SHIPPED | OUTPATIENT
Start: 2017-03-14 | End: 2017-04-13

## 2017-03-14 ASSESSMENT — ENCOUNTER SYMPTOMS
RESPIRATORY NEGATIVE: 1
SORE THROAT: 0
GASTROINTESTINAL NEGATIVE: 1
STRIDOR: 0
BACK PAIN: 0
EYES NEGATIVE: 1

## 2017-04-03 ENCOUNTER — HOSPITAL ENCOUNTER (OUTPATIENT)
Dept: GENERAL RADIOLOGY | Age: 47
Discharge: HOME | End: 2017-04-03
Payer: COMMERCIAL

## 2017-04-03 ENCOUNTER — OFFICE VISIT (OUTPATIENT)
Dept: NEUROSURGERY | Age: 47
End: 2017-04-03

## 2017-04-03 VITALS
HEART RATE: 90 BPM | BODY MASS INDEX: 24.91 KG/M2 | HEIGHT: 70 IN | DIASTOLIC BLOOD PRESSURE: 90 MMHG | OXYGEN SATURATION: 97 % | WEIGHT: 174 LBS | SYSTOLIC BLOOD PRESSURE: 131 MMHG

## 2017-04-03 DIAGNOSIS — Z98.1 S/P CERVICAL SPINAL FUSION: ICD-10-CM

## 2017-04-03 DIAGNOSIS — Z98.1 S/P CERVICAL SPINAL FUSION: Primary | ICD-10-CM

## 2017-04-03 PROCEDURE — 72040 X-RAY EXAM NECK SPINE 2-3 VW: CPT

## 2017-04-03 PROCEDURE — 99024 POSTOP FOLLOW-UP VISIT: CPT | Performed by: NEUROLOGICAL SURGERY

## 2017-04-03 RX ORDER — OXYCODONE AND ACETAMINOPHEN 10; 325 MG/1; MG/1
1 TABLET ORAL EVERY 8 HOURS PRN
Qty: 90 TABLET | Refills: 0 | Status: SHIPPED | OUTPATIENT
Start: 2017-04-03 | End: 2017-05-08 | Stop reason: SDUPTHER

## 2017-05-03 ENCOUNTER — TELEPHONE (OUTPATIENT)
Dept: NEUROLOGY | Age: 47
End: 2017-05-03

## 2017-05-08 RX ORDER — OXYCODONE AND ACETAMINOPHEN 10; 325 MG/1; MG/1
1 TABLET ORAL 2 TIMES DAILY PRN
Qty: 60 TABLET | Refills: 0 | Status: SHIPPED | OUTPATIENT
Start: 2017-05-08 | End: 2017-06-07

## 2017-05-15 ENCOUNTER — TELEPHONE (OUTPATIENT)
Dept: NEUROLOGY | Age: 47
End: 2017-05-15

## 2017-05-18 ENCOUNTER — TELEPHONE (OUTPATIENT)
Dept: NEUROLOGY | Age: 47
End: 2017-05-18

## 2017-06-06 ENCOUNTER — TELEPHONE (OUTPATIENT)
Dept: NEUROSURGERY | Age: 47
End: 2017-06-06

## 2017-06-12 ENCOUNTER — HOSPITAL ENCOUNTER (OUTPATIENT)
Dept: GENERAL RADIOLOGY | Age: 47
Discharge: HOME | End: 2017-06-12
Payer: COMMERCIAL

## 2017-06-12 ENCOUNTER — OFFICE VISIT (OUTPATIENT)
Dept: NEUROSURGERY | Age: 47
End: 2017-06-12
Payer: COMMERCIAL

## 2017-06-12 VITALS
WEIGHT: 186 LBS | OXYGEN SATURATION: 99 % | BODY MASS INDEX: 26.04 KG/M2 | DIASTOLIC BLOOD PRESSURE: 84 MMHG | SYSTOLIC BLOOD PRESSURE: 129 MMHG | HEIGHT: 71 IN | HEART RATE: 78 BPM

## 2017-06-12 DIAGNOSIS — Z98.1 S/P CERVICAL SPINAL FUSION: Primary | ICD-10-CM

## 2017-06-12 DIAGNOSIS — Z98.1 S/P CERVICAL SPINAL FUSION: ICD-10-CM

## 2017-06-12 PROCEDURE — 72040 X-RAY EXAM NECK SPINE 2-3 VW: CPT

## 2017-06-12 PROCEDURE — 99213 OFFICE O/P EST LOW 20 MIN: CPT | Performed by: NEUROLOGICAL SURGERY

## 2017-06-12 RX ORDER — OXYCODONE AND ACETAMINOPHEN 10; 325 MG/1; MG/1
1 TABLET ORAL DAILY PRN
Qty: 90 TABLET | Refills: 0 | Status: SHIPPED | OUTPATIENT
Start: 2017-06-12 | End: 2017-08-24 | Stop reason: ALTCHOICE

## 2017-08-14 ENCOUNTER — TELEPHONE (OUTPATIENT)
Dept: NEUROSURGERY | Age: 47
End: 2017-08-14

## 2017-08-14 ENCOUNTER — OFFICE VISIT (OUTPATIENT)
Dept: NEUROSURGERY | Age: 47
End: 2017-08-14
Payer: COMMERCIAL

## 2017-08-14 VITALS
DIASTOLIC BLOOD PRESSURE: 97 MMHG | HEART RATE: 96 BPM | BODY MASS INDEX: 26.22 KG/M2 | OXYGEN SATURATION: 97 % | WEIGHT: 188 LBS | SYSTOLIC BLOOD PRESSURE: 145 MMHG

## 2017-08-14 DIAGNOSIS — Z98.1 S/P CERVICAL SPINAL FUSION: Primary | ICD-10-CM

## 2017-08-14 DIAGNOSIS — M54.2 CERVICAL PAIN: ICD-10-CM

## 2017-08-14 DIAGNOSIS — M50.30 DDD (DEGENERATIVE DISC DISEASE), CERVICAL: ICD-10-CM

## 2017-08-14 DIAGNOSIS — M54.41 ACUTE MIDLINE LOW BACK PAIN WITH RIGHT-SIDED SCIATICA: ICD-10-CM

## 2017-08-14 PROCEDURE — 99213 OFFICE O/P EST LOW 20 MIN: CPT | Performed by: NURSE PRACTITIONER

## 2017-08-24 ENCOUNTER — OFFICE VISIT (OUTPATIENT)
Dept: FAMILY MEDICINE CLINIC | Age: 47
End: 2017-08-24
Payer: COMMERCIAL

## 2017-08-24 VITALS
BODY MASS INDEX: 26.32 KG/M2 | OXYGEN SATURATION: 99 % | WEIGHT: 188 LBS | TEMPERATURE: 98.2 F | SYSTOLIC BLOOD PRESSURE: 130 MMHG | HEART RATE: 110 BPM | HEIGHT: 71 IN | DIASTOLIC BLOOD PRESSURE: 64 MMHG

## 2017-08-24 DIAGNOSIS — M25.512 CHRONIC PAIN IN LEFT SHOULDER: ICD-10-CM

## 2017-08-24 DIAGNOSIS — G89.29 CHRONIC PAIN IN LEFT SHOULDER: ICD-10-CM

## 2017-08-24 DIAGNOSIS — M54.50 CHRONIC MIDLINE LOW BACK PAIN WITHOUT SCIATICA: ICD-10-CM

## 2017-08-24 DIAGNOSIS — G89.29 CHRONIC MIDLINE POSTERIOR NECK PAIN: Primary | ICD-10-CM

## 2017-08-24 DIAGNOSIS — S93.401A SPRAIN OF RIGHT ANKLE, UNSPECIFIED LIGAMENT, INITIAL ENCOUNTER: ICD-10-CM

## 2017-08-24 DIAGNOSIS — M54.2 CHRONIC MIDLINE POSTERIOR NECK PAIN: Primary | ICD-10-CM

## 2017-08-24 DIAGNOSIS — Z13.1 ENCOUNTER FOR SCREENING FOR DIABETES MELLITUS: ICD-10-CM

## 2017-08-24 DIAGNOSIS — G89.29 CHRONIC MIDLINE LOW BACK PAIN WITHOUT SCIATICA: ICD-10-CM

## 2017-08-24 DIAGNOSIS — E78.5 HYPERLIPIDEMIA, UNSPECIFIED HYPERLIPIDEMIA TYPE: ICD-10-CM

## 2017-08-24 PROCEDURE — 99214 OFFICE O/P EST MOD 30 MIN: CPT | Performed by: FAMILY MEDICINE

## 2017-08-24 RX ORDER — ATORVASTATIN CALCIUM 40 MG/1
40 TABLET, FILM COATED ORAL DAILY
Qty: 30 TABLET | Refills: 3 | Status: SHIPPED | OUTPATIENT
Start: 2017-08-24 | End: 2017-09-21

## 2017-08-24 RX ORDER — MELOXICAM 15 MG/1
15 TABLET ORAL DAILY
Qty: 30 TABLET | Refills: 1 | Status: SHIPPED | OUTPATIENT
Start: 2017-08-24 | End: 2017-09-12

## 2017-08-24 ASSESSMENT — ENCOUNTER SYMPTOMS
EYE PAIN: 0
VOMITING: 0
ABDOMINAL PAIN: 0
NAUSEA: 0
DIARRHEA: 0
WHEEZING: 0
RHINORRHEA: 0
EYE DISCHARGE: 0
SORE THROAT: 0
SHORTNESS OF BREATH: 0
COUGH: 0
CONSTIPATION: 0
BACK PAIN: 1

## 2017-09-12 ENCOUNTER — HOSPITAL ENCOUNTER (OUTPATIENT)
Dept: GENERAL RADIOLOGY | Age: 47
Discharge: HOME | End: 2017-09-12
Payer: COMMERCIAL

## 2017-09-12 ENCOUNTER — OFFICE VISIT (OUTPATIENT)
Dept: NEUROSURGERY | Age: 47
End: 2017-09-12
Payer: COMMERCIAL

## 2017-09-12 VITALS
WEIGHT: 184 LBS | SYSTOLIC BLOOD PRESSURE: 134 MMHG | HEART RATE: 122 BPM | HEIGHT: 71 IN | OXYGEN SATURATION: 99 % | BODY MASS INDEX: 25.76 KG/M2 | DIASTOLIC BLOOD PRESSURE: 83 MMHG

## 2017-09-12 DIAGNOSIS — R20.0 NUMBNESS OF TOES: ICD-10-CM

## 2017-09-12 DIAGNOSIS — M79.605 LEFT LEG PAIN: Primary | ICD-10-CM

## 2017-09-12 DIAGNOSIS — M54.42 ACUTE MIDLINE LOW BACK PAIN WITH BILATERAL SCIATICA: ICD-10-CM

## 2017-09-12 DIAGNOSIS — Z98.1 S/P CERVICAL SPINAL FUSION: ICD-10-CM

## 2017-09-12 DIAGNOSIS — M54.41 ACUTE MIDLINE LOW BACK PAIN WITH BILATERAL SCIATICA: ICD-10-CM

## 2017-09-12 PROCEDURE — 72050 X-RAY EXAM NECK SPINE 4/5VWS: CPT

## 2017-09-12 PROCEDURE — 99214 OFFICE O/P EST MOD 30 MIN: CPT | Performed by: NURSE PRACTITIONER

## 2017-09-12 RX ORDER — OXYCODONE AND ACETAMINOPHEN 10; 325 MG/1; MG/1
1 TABLET ORAL EVERY 6 HOURS PRN
Qty: 60 TABLET | Refills: 0 | Status: SHIPPED | OUTPATIENT
Start: 2017-09-12 | End: 2017-09-19

## 2017-09-14 ENCOUNTER — HOSPITAL ENCOUNTER (OUTPATIENT)
Dept: PAIN MANAGEMENT | Age: 47
Discharge: HOME | End: 2017-09-14
Payer: COMMERCIAL

## 2017-09-14 VITALS
TEMPERATURE: 98 F | HEART RATE: 112 BPM | RESPIRATION RATE: 18 BRPM | HEIGHT: 71 IN | OXYGEN SATURATION: 97 % | DIASTOLIC BLOOD PRESSURE: 93 MMHG | WEIGHT: 187 LBS | SYSTOLIC BLOOD PRESSURE: 149 MMHG | BODY MASS INDEX: 26.18 KG/M2

## 2017-09-14 PROCEDURE — 99204 OFFICE O/P NEW MOD 45 MIN: CPT

## 2017-09-14 ASSESSMENT — PAIN DESCRIPTION - ONSET: ONSET: ON-GOING

## 2017-09-14 ASSESSMENT — PAIN DESCRIPTION - LOCATION: LOCATION: BACK;NECK;SHOULDER

## 2017-09-14 ASSESSMENT — PAIN SCALES - GENERAL: PAINLEVEL_OUTOF10: 3

## 2017-09-14 ASSESSMENT — PAIN DESCRIPTION - ORIENTATION: ORIENTATION: RIGHT;LEFT;LOWER

## 2017-09-14 ASSESSMENT — ACTIVITIES OF DAILY LIVING (ADL): EFFECT OF PAIN ON DAILY ACTIVITIES: LIMITS ACTIVITIES

## 2017-09-14 ASSESSMENT — PAIN DESCRIPTION - DESCRIPTORS: DESCRIPTORS: ACHING;SHARP;STABBING

## 2017-09-14 ASSESSMENT — PAIN DESCRIPTION - PROGRESSION: CLINICAL_PROGRESSION: GRADUALLY WORSENING

## 2017-09-14 ASSESSMENT — PAIN DESCRIPTION - PAIN TYPE: TYPE: CHRONIC PAIN

## 2017-09-14 ASSESSMENT — PAIN DESCRIPTION - FREQUENCY: FREQUENCY: CONTINUOUS

## 2017-09-21 ENCOUNTER — TELEPHONE (OUTPATIENT)
Dept: NEUROSURGERY | Age: 47
End: 2017-09-21

## 2017-09-21 ENCOUNTER — TELEPHONE (OUTPATIENT)
Dept: FAMILY MEDICINE CLINIC | Age: 47
End: 2017-09-21

## 2017-09-21 ENCOUNTER — TELEPHONE (OUTPATIENT)
Dept: NEUROLOGY | Age: 47
End: 2017-09-21

## 2017-09-21 ENCOUNTER — OFFICE VISIT (OUTPATIENT)
Dept: FAMILY MEDICINE CLINIC | Age: 47
End: 2017-09-21
Payer: COMMERCIAL

## 2017-09-21 VITALS
TEMPERATURE: 98.2 F | HEIGHT: 71 IN | RESPIRATION RATE: 18 BRPM | WEIGHT: 184 LBS | SYSTOLIC BLOOD PRESSURE: 120 MMHG | HEART RATE: 77 BPM | OXYGEN SATURATION: 96 % | BODY MASS INDEX: 25.76 KG/M2 | DIASTOLIC BLOOD PRESSURE: 80 MMHG

## 2017-09-21 DIAGNOSIS — J30.2 SEASONAL ALLERGIC RHINITIS, UNSPECIFIED ALLERGIC RHINITIS TRIGGER: ICD-10-CM

## 2017-09-21 DIAGNOSIS — R19.7 DIARRHEA, UNSPECIFIED TYPE: Primary | ICD-10-CM

## 2017-09-21 PROCEDURE — 99213 OFFICE O/P EST LOW 20 MIN: CPT | Performed by: NURSE PRACTITIONER

## 2017-09-21 RX ORDER — DICYCLOMINE HCL 20 MG
20 TABLET ORAL 3 TIMES DAILY PRN
Qty: 30 TABLET | Refills: 0 | Status: SHIPPED | OUTPATIENT
Start: 2017-09-21 | End: 2018-02-06 | Stop reason: ALTCHOICE

## 2017-09-21 RX ORDER — OXYCODONE AND ACETAMINOPHEN 10; 325 MG/1; MG/1
1 TABLET ORAL EVERY 6 HOURS PRN
COMMUNITY
End: 2017-10-09 | Stop reason: SDUPTHER

## 2017-09-21 RX ORDER — LEVOCETIRIZINE DIHYDROCHLORIDE 5 MG/1
5 TABLET, FILM COATED ORAL NIGHTLY
Qty: 30 TABLET | Refills: 1 | Status: SHIPPED | OUTPATIENT
Start: 2017-09-21 | End: 2018-02-06 | Stop reason: ALTCHOICE

## 2017-09-21 RX ORDER — CIPROFLOXACIN 500 MG/1
500 TABLET, FILM COATED ORAL 2 TIMES DAILY
Qty: 20 TABLET | Refills: 0 | Status: SHIPPED | OUTPATIENT
Start: 2017-09-21 | End: 2017-10-01

## 2017-09-21 ASSESSMENT — ENCOUNTER SYMPTOMS
NAUSEA: 1
ABDOMINAL PAIN: 1
DIARRHEA: 1
EYE PAIN: 1
VOMITING: 1
EYE REDNESS: 1

## 2017-09-22 ENCOUNTER — HOSPITAL ENCOUNTER (OUTPATIENT)
Dept: MRI IMAGING | Age: 47
Discharge: HOME | End: 2017-09-22
Payer: COMMERCIAL

## 2017-09-22 DIAGNOSIS — R20.0 NUMBNESS OF TOES: ICD-10-CM

## 2017-09-22 DIAGNOSIS — M79.605 LEFT LEG PAIN: ICD-10-CM

## 2017-09-22 DIAGNOSIS — M54.42 ACUTE MIDLINE LOW BACK PAIN WITH BILATERAL SCIATICA: ICD-10-CM

## 2017-09-22 DIAGNOSIS — M54.41 ACUTE MIDLINE LOW BACK PAIN WITH BILATERAL SCIATICA: ICD-10-CM

## 2017-09-22 PROCEDURE — 72148 MRI LUMBAR SPINE W/O DYE: CPT

## 2017-10-10 RX ORDER — OXYCODONE AND ACETAMINOPHEN 10; 325 MG/1; MG/1
1 TABLET ORAL EVERY 6 HOURS PRN
Qty: 60 TABLET | Refills: 0 | Status: SHIPPED | OUTPATIENT
Start: 2017-10-12 | End: 2017-10-25 | Stop reason: SDUPTHER

## 2017-10-17 ENCOUNTER — OFFICE VISIT (OUTPATIENT)
Dept: NEUROSURGERY | Age: 47
End: 2017-10-17
Payer: COMMERCIAL

## 2017-10-17 VITALS
OXYGEN SATURATION: 96 % | WEIGHT: 184 LBS | HEIGHT: 71 IN | BODY MASS INDEX: 25.76 KG/M2 | DIASTOLIC BLOOD PRESSURE: 85 MMHG | HEART RATE: 90 BPM | SYSTOLIC BLOOD PRESSURE: 129 MMHG

## 2017-10-17 DIAGNOSIS — M54.42 ACUTE MIDLINE LOW BACK PAIN WITH BILATERAL SCIATICA: Primary | ICD-10-CM

## 2017-10-17 DIAGNOSIS — Z98.1 S/P CERVICAL SPINAL FUSION: ICD-10-CM

## 2017-10-17 DIAGNOSIS — M54.2 NECK PAIN: ICD-10-CM

## 2017-10-17 DIAGNOSIS — M54.41 ACUTE MIDLINE LOW BACK PAIN WITH BILATERAL SCIATICA: Primary | ICD-10-CM

## 2017-10-17 DIAGNOSIS — M79.605 LEFT LEG PAIN: ICD-10-CM

## 2017-10-17 PROCEDURE — 99213 OFFICE O/P EST LOW 20 MIN: CPT | Performed by: NEUROLOGICAL SURGERY

## 2017-10-17 NOTE — PROGRESS NOTES
occasionally         Family History   Problem Relation Age of Onset    Cancer Mother      breast     Cancer Maternal Grandmother      breast     Cancer Father      colon       Review of Systems   Constitutional: Negative. HENT: Negative. Negative for congestion, ear discharge, ear pain, hearing loss, nosebleeds, sore throat and tinnitus. Eyes: Negative. Respiratory: Negative. Negative for stridor. Cardiovascular: Negative. Gastrointestinal: Negative. Genitourinary: Negative. Musculoskeletal: Positive for myalgias and neck pain. Negative for back pain, falls and joint pain. Skin: Negative. Neurological: Positive for tingling. Negative for dizziness, tremors, sensory change, speech change, focal weakness, seizures, loss of consciousness and headaches. Endo/Heme/Allergies: Negative. Psychiatric/Behavioral: Negative. PHYSICAL EXAM:  Vitals:    10/17/17 0829   BP: 129/85   Pulse: 90   SpO2: 96%     Constitutional: The patient appears well-developed and well-nourished. Eyes  conjunctiva normal.  Conjugate gaze  Ear, nose, throat -No scars, masses, or lesions over external nose or ears, no atrophy of tongue  Neck-symmetric, no masses noted, no jugular vein distension  Respiration- chest wall appears symmetric, good expansion, normal effort without use of accessory muscles  Musculoskeletal  no significant wasting of muscles noted, no bony deformities, gait no gross ataxia  Extremities-no clubbing, cyanosis or edema  Skin  warm, dry, and intact. No rash, erythema, or pallor.   Psychiatric  mood, affect, and behavior appear normal.     Neurologic Examinaiton  Awake, Alert and oriented x 3  Normal speech pattern, following commands  Motor 5/5 all extremities  No deficits to light touch or pinprick sensation    Normal Gait pattern      Wound:  Healed well    DATA and IMAGING:    Lab Results   Component Value Date    WBC 19.3 (H) 03/02/2017    HGB 14.0 03/02/2017    HCT 42.3 03/02/2017    MCV 92.2 03/02/2017     03/02/2017     Lab Results   Component Value Date     03/02/2017    K 4.1 03/02/2017     03/02/2017    CO2 23 03/02/2017    BUN 7 03/02/2017    CREATININE 0.6 03/02/2017    GLUCOSE 122 (H) 03/02/2017    CALCIUM 8.9 03/02/2017    PROT 6.4 (L) 03/02/2017    LABALBU 3.6 03/02/2017    BILITOT 0.5 03/02/2017    ALKPHOS 62 03/02/2017    AST 14 03/02/2017    ALT 11 03/02/2017    LABGLOM >60 03/02/2017    GLOB 2.8 03/02/2017     Lab Results   Component Value Date    INR 0.92 02/15/2017    PROTIME 12.4 02/15/2017    No results found. Narrative   Examination. XR CERVICAL SPINE LIMITED   History: Post cervical fusion. The frontal and laterally of the cervical spine are compared with the   previous study dated 4/3/2017. There is evidence of anterior spinal fusion of C5, C6 and C7. Normal   alignment. No hardware complication. The disc spacers are in place. There are postsurgical changes of C2 with a metallic screw which is in   normal position. No change since the previous study. The alignment of the vertebral bodies are normal. The heights are   normal. The posterior processes are intact. Prevertebral soft tissues   are normal.       Impression   Normal postsurgical changes of the cervical spine. Normal   alignment. No hardware complication. Signed by Dr Vangie Westbrook on 6/12/2017 2:31 PM     My Interpretation:   The hardware is stable    Narrative   EXAMINATION: [facility]  9/22/2017 5:29 PM   HISTORY: Left leg pain. Low back pain. Toe numbness. COMPARISON: No comparison study. TECHNIQUE: Multiplanar imaging was performed. FINDINGS:  At L5-S1, there is minimal disc bulging and mild facet   arthropathy. There is no significant spinal or foraminal stenosis. At L4-5, there is mild disc bulging. There is a small left foraminal   disc herniation extending into the inferior recess. There is mild   facet arthropathy.  There is no

## 2017-10-25 ENCOUNTER — HOSPITAL ENCOUNTER (OUTPATIENT)
Dept: PAIN MANAGEMENT | Age: 47
Discharge: HOME | End: 2017-10-25
Payer: COMMERCIAL

## 2017-10-25 VITALS
HEIGHT: 71 IN | OXYGEN SATURATION: 98 % | TEMPERATURE: 99.1 F | RESPIRATION RATE: 18 BRPM | WEIGHT: 195 LBS | DIASTOLIC BLOOD PRESSURE: 76 MMHG | SYSTOLIC BLOOD PRESSURE: 119 MMHG | HEART RATE: 80 BPM | BODY MASS INDEX: 27.3 KG/M2

## 2017-10-25 DIAGNOSIS — G89.29 CHRONIC LEFT SHOULDER PAIN: ICD-10-CM

## 2017-10-25 DIAGNOSIS — M25.512 CHRONIC LEFT SHOULDER PAIN: ICD-10-CM

## 2017-10-25 DIAGNOSIS — M54.2 NECK PAIN: ICD-10-CM

## 2017-10-25 DIAGNOSIS — M54.2 NECK PAIN: Chronic | ICD-10-CM

## 2017-10-25 DIAGNOSIS — G89.29 CHRONIC RIGHT SHOULDER PAIN: ICD-10-CM

## 2017-10-25 DIAGNOSIS — M54.5 CHRONIC LOW BACK PAIN, UNSPECIFIED BACK PAIN LATERALITY, WITH SCIATICA PRESENCE UNSPECIFIED: Primary | Chronic | ICD-10-CM

## 2017-10-25 DIAGNOSIS — G89.29 CHRONIC LOW BACK PAIN, UNSPECIFIED BACK PAIN LATERALITY, WITH SCIATICA PRESENCE UNSPECIFIED: Primary | Chronic | ICD-10-CM

## 2017-10-25 DIAGNOSIS — M50.10 CERVICAL DISC DISORDER WITH RADICULOPATHY: Chronic | ICD-10-CM

## 2017-10-25 DIAGNOSIS — M25.511 CHRONIC RIGHT SHOULDER PAIN: ICD-10-CM

## 2017-10-25 PROCEDURE — 80307 DRUG TEST PRSMV CHEM ANLYZR: CPT

## 2017-10-25 PROCEDURE — 6360000002 HC RX W HCPCS

## 2017-10-25 PROCEDURE — 99152 MOD SED SAME PHYS/QHP 5/>YRS: CPT

## 2017-10-25 PROCEDURE — 62321 NJX INTERLAMINAR CRV/THRC: CPT

## 2017-10-25 PROCEDURE — 2500000003 HC RX 250 WO HCPCS

## 2017-10-25 PROCEDURE — 62323 NJX INTERLAMINAR LMBR/SAC: CPT

## 2017-10-25 PROCEDURE — 2580000003 HC RX 258

## 2017-10-25 PROCEDURE — 3209999900 FLUORO FOR SURGICAL PROCEDURES

## 2017-10-25 RX ORDER — MIDAZOLAM HYDROCHLORIDE 1 MG/ML
INJECTION INTRAMUSCULAR; INTRAVENOUS
Status: COMPLETED | OUTPATIENT
Start: 2017-10-25 | End: 2017-10-25

## 2017-10-25 RX ORDER — LIDOCAINE HYDROCHLORIDE 10 MG/ML
INJECTION, SOLUTION EPIDURAL; INFILTRATION; INTRACAUDAL; PERINEURAL
Status: COMPLETED | OUTPATIENT
Start: 2017-10-25 | End: 2017-10-25

## 2017-10-25 RX ORDER — 0.9 % SODIUM CHLORIDE 0.9 %
VIAL (ML) INJECTION
Status: COMPLETED | OUTPATIENT
Start: 2017-10-25 | End: 2017-10-25

## 2017-10-25 RX ORDER — METHYLPREDNISOLONE ACETATE 80 MG/ML
INJECTION, SUSPENSION INTRA-ARTICULAR; INTRALESIONAL; INTRAMUSCULAR; SOFT TISSUE
Status: COMPLETED | OUTPATIENT
Start: 2017-10-25 | End: 2017-10-25

## 2017-10-25 RX ORDER — BUPIVACAINE HYDROCHLORIDE 2.5 MG/ML
INJECTION, SOLUTION EPIDURAL; INFILTRATION; INTRACAUDAL
Status: COMPLETED | OUTPATIENT
Start: 2017-10-25 | End: 2017-10-25

## 2017-10-25 RX ADMIN — LIDOCAINE HYDROCHLORIDE 3 ML: 10 INJECTION, SOLUTION EPIDURAL; INFILTRATION; INTRACAUDAL; PERINEURAL at 16:15

## 2017-10-25 RX ADMIN — METHYLPREDNISOLONE ACETATE 80 MG: 80 INJECTION, SUSPENSION INTRA-ARTICULAR; INTRALESIONAL; INTRAMUSCULAR; SOFT TISSUE at 16:20

## 2017-10-25 RX ADMIN — BUPIVACAINE HYDROCHLORIDE 2.5 ML: 2.5 INJECTION, SOLUTION EPIDURAL; INFILTRATION; INTRACAUDAL at 16:19

## 2017-10-25 RX ADMIN — MIDAZOLAM HYDROCHLORIDE 2 MG: 1 INJECTION INTRAMUSCULAR; INTRAVENOUS at 16:13

## 2017-10-25 RX ADMIN — Medication 1.5 ML: at 16:20

## 2017-10-25 ASSESSMENT — PAIN - FUNCTIONAL ASSESSMENT
PAIN_FUNCTIONAL_ASSESSMENT: 0-10

## 2017-10-25 ASSESSMENT — ACTIVITIES OF DAILY LIVING (ADL): EFFECT OF PAIN ON DAILY ACTIVITIES: LIMITS ADL'S

## 2017-10-25 NOTE — PROGRESS NOTES
Patient Name: Estrada Vasquez  : 1970  MRN: [id number]    PRE-SEDATION ASSESSMENT    Procedure:  [unfilled]  I have examined the patient's status immediately prior to the procedure. BRIEF H&P    HPI/Changes/Indicators/Diagnosis  There are no active hospital problems to display for this patient. Medications:  Prior to Admission medications    Medication Sig Start Date End Date Taking? Authorizing Provider   oxyCODONE-acetaminophen (PERCOCET)  MG per tablet Take 1 tablet by mouth every 6 hours as needed for Pain . Earliest Fill Date: 10/12/17 10/12/17   Jerald Garcia MD   dicyclomine (BENTYL) 20 MG tablet Take 1 tablet by mouth 3 times daily as needed (abdominal spasms) 17   NOEMI Schmitt   levocetirizine (XYZAL) 5 MG tablet Take 1 tablet by mouth nightly 17   NOEMI Schmitt   Acetaminophen (TYLENOL 8 HOUR PO) Take by mouth    Historical Provider, MD   cyclobenzaprine (FLEXERIL) 10 MG tablet Take 10 mg by mouth 3 times daily as needed for Muscle spasms    Historical Provider, MD       Allergies:  is allergic to pcn [penicillins]. Vital Signs:  Vitals:    10/25/17 1520   BP: (!) 146/80   Pulse: 95   Resp: 20   Temp: 99.1 °F (37.3 °C)   SpO2: 95%       Physical Exam:  Cardiac:                                    [x]WNL                    []Comments:    Pulmonary:                               [x]WNL                    []Comments:    Neuro/Mental Status:               [x]WNL                    []Comments:      Informed Consent:  The risks and benefits of the procedure have been discussed with either the patient or if they cannot consent their representative. Assessment:  Patient examined and appropriate for the planned sedation and procedure. Plan:  Proceed with planned sedation and procedure as above. Mallampati Airway Assessment: Adequate      ASA STATUS:  []1. Normal Healty  [x]2. Mild Systemice Disease, doesn't limit activity eg HTN, mild DM  []3.  Severe Systemic Disease, does limit activity eg stable angina, DM with vascular         disease          []4. Severe Systemic Disease constant threat eg CHF, renal failure  []5.  Moribund not expected to survive without procedure          Cal Sicard, RN

## 2017-10-25 NOTE — PROCEDURES
[] No prescriptions needed today  [x]  Patient is to call with any questions or concerns which may arise prior to the next office visit. Also plan PT.                [x]Over 50% of today's appointment was given to discussion, evaluation and counseling.                Bandar Noel RN

## 2017-10-30 ENCOUNTER — HOSPITAL ENCOUNTER (OUTPATIENT)
Dept: PHYSICAL THERAPY | Age: 47
Setting detail: THERAPIES SERIES
Discharge: HOME | End: 2017-10-30
Payer: COMMERCIAL

## 2017-10-30 PROCEDURE — G8982 BODY POS GOAL STATUS: HCPCS

## 2017-10-30 PROCEDURE — 97162 PT EVAL MOD COMPLEX 30 MIN: CPT

## 2017-10-30 PROCEDURE — G8981 BODY POS CURRENT STATUS: HCPCS

## 2017-10-30 ASSESSMENT — PAIN SCALES - GENERAL: PAINLEVEL_OUTOF10: 5

## 2017-10-30 ASSESSMENT — PAIN DESCRIPTION - LOCATION: LOCATION: NECK;BACK

## 2017-10-30 ASSESSMENT — PAIN DESCRIPTION - DESCRIPTORS: DESCRIPTORS: ACHING;BURNING;SHOOTING;SHARP

## 2017-10-30 ASSESSMENT — PAIN DESCRIPTION - ORIENTATION: ORIENTATION: LEFT

## 2017-10-30 ASSESSMENT — PAIN DESCRIPTION - PAIN TYPE: TYPE: CHRONIC PAIN

## 2017-10-30 NOTE — PROGRESS NOTES
Physical Therapy  Initial Assessment  Date: 10/30/2017  Patient Name: Yue Esqueda  MRN: [id number]  : 1970        Subjective   General  Chart Reviewed: Yes  Patient assessed for rehabilitation services?: Yes  Additional Pertinent Hx: Patient presents to therapy with complaints of left sided neck and low back pain. Patient had C5-6. C6-7 ACDF 2017 and has remote history of upper cervical trauma and odontoid process pinning per patient report. Patient states the neck pain has been present since surgery but the pain has lessened compared with prior to surgery. Patient reports 3 month history of back pain. Additional pertinent information includes COPD. Response To Previous Treatment: Not applicable  Family / Caregiver Present: No  Referring Practitioner: Kobe Barbour CNP  Referral Date : 10/17/17  Diagnosis: Acute midline low back pain with sciatica (M54.42), Left leg pain (M79.605), S/P cervical spinal fusion (Z98.1), neck pain (M54.2)  Follows Commands: Within Functional Limits  PT Visit Information  Onset Date: 10/30/17  PT Insurance Information: [employer] (Yasmin 1153 required)  Total # of Visits Approved:  (12 anticipated)  Total # of Visits to Date: 1  Plan of Care/Certification Expiration Date:  (?)  Progress Note Due Date: 17  Subjective  Subjective: Patient reports pain when staying in any position for very long. He reports being more guarded with movement and doing fewer things that might cause him more back or neck pain lately. Pain Screening  Patient Currently in Pain: Yes  Pain Assessment  Pain Assessment: 0-10  Pain Level: 5  Pain Type: Chronic pain  Pain Location: Neck;Back  Pain Orientation: Left  Pain Descriptors: Aching;Burning; Shooting; Sharp  Pain Intervention(s): Medication (see eMar)  Vital Signs  Patient Currently in Pain: Yes    Vision/Hearing  Vision  Vision: Within Functional Limits  Hearing  Hearing: Within functional limits    Orientation       Social/Functional

## 2017-11-06 ENCOUNTER — HOSPITAL ENCOUNTER (OUTPATIENT)
Dept: PHYSICAL THERAPY | Age: 47
Setting detail: THERAPIES SERIES
Discharge: HOME | End: 2017-11-06
Payer: COMMERCIAL

## 2017-11-06 PROCEDURE — 97110 THERAPEUTIC EXERCISES: CPT

## 2017-11-06 ASSESSMENT — PAIN SCALES - GENERAL: PAINLEVEL_OUTOF10: 2

## 2017-11-06 ASSESSMENT — PAIN DESCRIPTION - LOCATION: LOCATION: NECK;SHOULDER

## 2017-11-06 ASSESSMENT — PAIN DESCRIPTION - PAIN TYPE: TYPE: CHRONIC PAIN

## 2017-11-06 ASSESSMENT — PAIN DESCRIPTION - ORIENTATION: ORIENTATION: LEFT

## 2017-11-06 NOTE — PROGRESS NOTES
lift  16.5#  x 5 (light wts with emphasis on technique)  Exercise 17: Squat (progress to leg press)  x 10   Exercise 18: Is , Ys prone -->standing with theraband (prone x 5 ea)  Exercise 19: estim + HP PRN--Declined (prefers ice)     Assessment:   Conditions Requiring Skilled Therapeutic Intervention  Body structures, Functions, Activity limitations: Decreased functional mobility ; Decreased ROM; Decreased high-level IADLs  Assessment: Initiated POC per initial evaluation, able to go thru routine with min discomfort primiarly in his L shoulder and neck, If he has soreness or pain tonight or tomorrow advist therapist on next, if tolerated well can advance repetitions and add wts as appropriate  REQUIRES PT FOLLOW UP: Yes      G-Code:              Goals:  Short term goals  Time Frame for Short term goals: 2-3 weeks  Short term goal 1: Patient will be independent with HEP  Short term goal 2: Patient will decrease tenderness to palpation L upper trapezius to decrease pain  Short term goal 3: Patient will improve cervical flexion to 0-35 or greater with no pain noted to improve functional mobility  Long term goals  Time Frame for Long term goals : 4-6 weeks  Long term goal 1: Patient will improve score on NPDI to 30% impairment or less to demonstrate decreased disability  Long term goal 2: Patient will improve score on Oswestry to 25% impairment or less to demonstrate decreased disability  Long term goal 3: Patient will report improvement in pain to no greater than 2/10 or less at rest to demonstrate improved quality of life  Long term goal 4: Patient will improve lumbar rotation to 3/4 or greater bilaterally  Patient Goals   Patient goals :  To have less pain    Plan:    Plan  Times per week: 2  Plan weeks: 4-6 weeks  Specific instructions for Next Treatment: Potential need for SI belt  Current Treatment Recommendations: Strengthening, ROM, Functional Mobility Training, Neuromuscular Re-education, Manual Therapy - Soft Tissue Mobilization, Manual Therapy - Joint Manipulation, Home Exercise Program, Patient/Caregiver Education & Training, Modalities        Therapy Time   Individual Concurrent Group Co-treatment   Time In 1401         Time Out 2068         Minutes [address]    Electronically signed by Woodrow Hernandez PTA on 11/6/2017 at 2:53 PM

## 2017-11-08 LAB
AMPHETAMINES, URINE: NEGATIVE NG/ML
BARBITURATES, URINE: NEGATIVE NG/ML
BENZODIAZEPINES, URINE: NEGATIVE NG/ML
CANNABINOIDS, URINE: NEGATIVE NG/ML
COCAINE METABOLITE, URINE: NEGATIVE NG/ML
CREATININE, URINE: 27.8 MG/DL (ref 20–300)
ETHANOL U, QUAN: NEGATIVE %
FENTANYL URINE: NEGATIVE PG/ML
MEPERIDINE, UR: NEGATIVE NG/ML
METHADONE SCREEN, URINE: NEGATIVE NG/ML
OPIATES, URINE: NEGATIVE NG/ML
OXYCODONE URINE: POSITIVE
OXYCODONE, UR GC/MS: >150 NG/ML
OXYCODONE/OXYMORPH, UR: POSITIVE
OXYCODONE/OXYMORPHONE, UR: ABNORMAL NG/ML
OXYMORPHONE, UR GC/MS: >150 NG/ML
OXYMORPHONE, URINE: POSITIVE
PH, URINE: 6.2 (ref 4.5–8.9)
PHENCYCLIDINE, URINE: NEGATIVE NG/ML
PROPOXYPHENE, URINE: NEGATIVE NG/ML

## 2017-11-09 ENCOUNTER — HOSPITAL ENCOUNTER (OUTPATIENT)
Dept: PHYSICAL THERAPY | Age: 47
Setting detail: THERAPIES SERIES
Discharge: HOME | End: 2017-11-09
Payer: COMMERCIAL

## 2017-11-09 PROCEDURE — 97110 THERAPEUTIC EXERCISES: CPT

## 2017-11-09 ASSESSMENT — PAIN DESCRIPTION - DESCRIPTORS: DESCRIPTORS: ACHING;BURNING;SHOOTING

## 2017-11-09 ASSESSMENT — PAIN DESCRIPTION - ORIENTATION: ORIENTATION: LEFT

## 2017-11-09 ASSESSMENT — PAIN DESCRIPTION - PAIN TYPE: TYPE: CHRONIC PAIN

## 2017-11-09 ASSESSMENT — PAIN DESCRIPTION - LOCATION: LOCATION: NECK;SHOULDER

## 2017-11-09 ASSESSMENT — PAIN SCALES - GENERAL: PAINLEVEL_OUTOF10: 4

## 2017-11-09 NOTE — PROGRESS NOTES
4  Exercise 5: Left Levator scapula stretch  10\" x 4  Exercise 6: Scalene stretch (rotate R and bend R)  10\" x 4  Exercise 7: Corner stretch 5\" x 5  Exercise 8: Backward shoulder rolls  x 10  Exercise 9: Hooklying lumbar rotation  x 10  Exercise 10: Multifidus row (green) x 10 ea  Exercise 11: Paloff press (green) x 10 ea  Exercise 12: Prone on elbows  x  1'---NOT today  Exercise 13: TA contraction series  (alone 5\" x 10), ( alt UE's, alt LE's & alt UE/LE x 10 ea)  Exercise 14: Forward bent row bilateral  2# x 10 ea  Exercise 15: R hip extension isometric at 90-90 x 5 for 5\"  Exercise 16: Box lift  16.5#  x 5  Exercise 17: Squat (progress to leg press)-----leg press @ 96# seat hole 3 x 15  Exercise 18: Is , Ys prone -->standing with theraband (standing with red band today)  Exercise 19: estim + HP PRN--Declined both (prefers ice to heat and says maybe next time he will do ESTIM according to pn level)  Exercise 20: HEP in chart 11/8/17 (may need to revise HEP to challenge pt more)              Assessment:   Conditions Requiring Skilled Therapeutic Intervention  Body structures, Functions, Activity limitations: Decreased functional mobility ; Decreased ROM; Decreased high-level IADLs  Assessment: Pt to clinic today with elevated pain L shldr and neck. Pt repeatedly opening/closing hand and moving L arm due to discomfort majority of session. Pt able to perform routine with min cues for technique and does a great job of maintaining appropriate hold time during routine. Pt did do I's and Y's in standing today with red Tband and used leg press machine instead of squats. Pt took med for pn prior to session and reporting some effect of this towards end of session, but cont to seem very uncomfortable by his inability to hold UE still. Pt declined ESTIM this visit, stating he needed to return to work, but said he may wish to try next visit.  (pt familiar with ESTIM and doesn't remember it being very beneficial for him before)  REQUIRES PT FOLLOW UP: Yes      G-Code:     OutComes Score                        Goals:  Short term goals  Time Frame for Short term goals: 2-3 weeks  Short term goal 1: Patient will be independent with HEP  Short term goal 2: Patient will decrease tenderness to palpation L upper trapezius to decrease pain  Short term goal 3: Patient will improve cervical flexion to 0-35 or greater with no pain noted to improve functional mobility  Long term goals  Time Frame for Long term goals : 4-6 weeks  Long term goal 1: Patient will improve score on NPDI to 30% impairment or less to demonstrate decreased disability  Long term goal 2: Patient will improve score on Oswestry to 25% impairment or less to demonstrate decreased disability  Long term goal 3: Patient will report improvement in pain to no greater than 2/10 or less at rest to demonstrate improved quality of life  Long term goal 4: Patient will improve lumbar rotation to 3/4 or greater bilaterally  Patient Goals   Patient goals :  To have less pain    Plan:       Timed Code Treatment Minutes: 62 Minutes     Therapy Time   Individual Concurrent Group Co-treatment   Time In 1402         Time Out 1500         Minutes 58         Timed Code Treatment Minutes: 58 [address] PTA     Electronically signed by Derek Garzon PTA on 11/9/2017 at 4:20 PM

## 2017-11-13 ENCOUNTER — HOSPITAL ENCOUNTER (OUTPATIENT)
Dept: PHYSICAL THERAPY | Age: 47
Setting detail: THERAPIES SERIES
Discharge: HOME | End: 2017-11-13
Payer: COMMERCIAL

## 2017-11-13 PROCEDURE — 97110 THERAPEUTIC EXERCISES: CPT

## 2017-11-13 ASSESSMENT — PAIN DESCRIPTION - ORIENTATION: ORIENTATION: RIGHT

## 2017-11-13 ASSESSMENT — PAIN DESCRIPTION - FREQUENCY: FREQUENCY: CONTINUOUS

## 2017-11-13 ASSESSMENT — PAIN DESCRIPTION - DESCRIPTORS: DESCRIPTORS: ACHING;BURNING;SHOOTING

## 2017-11-13 ASSESSMENT — PAIN DESCRIPTION - LOCATION: LOCATION: BACK;NECK;SHOULDER

## 2017-11-13 ASSESSMENT — PAIN DESCRIPTION - PAIN TYPE: TYPE: CHRONIC PAIN

## 2017-11-13 NOTE — PROGRESS NOTES
Physical Therapy  Daily Treatment Note  Date: 2017  Patient Name: Sherman Nieves  MRN: [id number]     :   1970    Subjective:   General  Additional Pertinent Hx: Patient presents to therapy with complaints of left sided neck and low back pain. Patient had C5-6. C6-7 ACDF 2017 and has remote history of upper cervical trauma and odontoid process pinning per patient report. Patient states the neck pain has been present since surgery but the pain has lessened compared with prior to surgery. Patient reports 3 month history of back pain. Additional pertinent information includes COPD. Response To Previous Treatment: Patient reporting fatigue but able to participate. (states he was a little sore after last visit)  Referring Practitioner: Beverly Milan CNP  PT Visit Information  Onset Date: 10/30/17  PT Insurance Information: [employer] (Precert required)  Total # of Visits Approved: 12  Total # of Visits to Date: 4  Progress Note Due Date: 17  Subjective  Subjective: I had to do a lot of crazy stuff at work this weekend. That is probably what irritated me the most.    General Comment  Comments: pt would like to be challenged more in PT, but explained that prob not a good time to inc challenges (kevon with L shldr) if pain is elevated and pt seems to agree  Pain Screening  Patient Currently in Pain: Yes  Pain Assessment  Pain Assessment: 0-10  Pain Level:  (back 6/10, shoulders/neck 4/10 pre therapy; 2/10 back and 5/10 neckj/shoulders post session)  Pain Type: Chronic pain  Pain Location: Back;Neck; Shoulder  Pain Orientation: Right  Pain Descriptors: Aching;Burning; Shooting  Pain Frequency: Continuous  Vital Signs  Patient Currently in Pain: Yes       Treatment Activities:                                      Exercises  Exercise 1: *NO ICT d/t cervical fusion*---------Chin tucks  (with BP cuff biofeedback)-- x 5 for 5\" supine on pillow  Exercise 2: Slouch with overcorrection  x 10  Exercise 3: Scapular retraction  x 10  Exercise 4: Left Upper trapezius stretch 10\" x 4  Exercise 5: Left Levator scapula stretch  10\" x 4  Exercise 6: Scalene stretch (rotate R and bend R)  10\" x 4  Exercise 7: Corner stretch 5\" x 5  Exercise 8: Backward shoulder rolls  x 10  Exercise 9: Hooklying lumbar rotation  x 10  Exercise 10: Multifidus row (green) x 10 ea  Exercise 11: Paloff press (green) x 10 ea  Exercise 12: Prone on elbows  x  1'  Exercise 13: TA contraction series  (alone 5\" x 10), ( alt UE's, alt LE's & alt UE/LE x 10 ea)  Exercise 14: Forward bent row bilateral  2# x 10 ea  Exercise 15: R hip extension isometric at 90-90 x 5 for 5\"  Exercise 16: Box lift  16.5#  x 5--NOT PERFORMED  Exercise 17: Squat (progress to leg press)-----leg press @ 96# seat hole 3 x 15  Exercise 18: Is , Ys prone -->standing with green t-band x 10 each  Exercise 19: estim + HP PRN--Declined both (prefers ice to heat and says maybe next time he will do ESTIM according to pn level)  Exercise 20: HEP issued 11/13/2017 (may need to revise HEP to challenge pt more)                                   Assessment:   Conditions Requiring Skilled Therapeutic Intervention  Body structures, Functions, Activity limitations: Decreased functional mobility ; Decreased ROM; Decreased high-level IADLs  Assessment: Patient did fair with session today. Issued HEP today. Patient demonstrated all and verbalized understanding. He kept moving right shoulder/arm/hand in between activites as if writhing in pain. He stated it is his arthritis bothering him. He reported decrease in back pain post session but increase in neck/shoulder/arm.   REQUIRES PT FOLLOW UP: Yes      G-Code:     OutComes Score                                                     Goals:  Short term goals  Time Frame for Short term goals: 2-3 weeks  Short term goal 1: Patient will be independent with HEP  Short term goal 2: Patient will decrease tenderness to palpation L upper trapezius to decrease pain  Short term goal 3: Patient will improve cervical flexion to 0-35 or greater with no pain noted to improve functional mobility  Long term goals  Time Frame for Long term goals : 4-6 weeks  Long term goal 1: Patient will improve score on NPDI to 30% impairment or less to demonstrate decreased disability  Long term goal 2: Patient will improve score on Oswestry to 25% impairment or less to demonstrate decreased disability  Long term goal 3: Patient will report improvement in pain to no greater than 2/10 or less at rest to demonstrate improved quality of life  Long term goal 4: Patient will improve lumbar rotation to 3/4 or greater bilaterally  Patient Goals   Patient goals :  To have less pain    Plan:    Plan  Times per week: 2  Plan weeks: 4-6 weeks  Specific instructions for Next Treatment: Potential need for SI belt  Current Treatment Recommendations: Strengthening, ROM, Functional Mobility Training, Neuromuscular Re-education, Manual Therapy - Soft Tissue Mobilization, Manual Therapy - Joint Manipulation, Home Exercise Program, Patient/Caregiver Education & Training, Modalities  Timed Code Treatment Minutes: 51 Minutes     Therapy Time   Individual Concurrent Group Co-treatment   Time In 0992         Time Out 1456         Minutes 51         Timed Code Treatment Minutes: Bettie Kam PTA       Electronically signed by Esme Fishre PTA on 11/13/2017 at 5:10 PM

## 2017-11-16 ENCOUNTER — APPOINTMENT (OUTPATIENT)
Dept: PHYSICAL THERAPY | Age: 47
End: 2017-11-16
Payer: COMMERCIAL

## 2017-11-20 ENCOUNTER — HOSPITAL ENCOUNTER (OUTPATIENT)
Dept: PHYSICAL THERAPY | Age: 47
Setting detail: THERAPIES SERIES
Discharge: HOME | End: 2017-11-20
Payer: COMMERCIAL

## 2017-11-20 PROCEDURE — 97110 THERAPEUTIC EXERCISES: CPT

## 2017-11-20 PROCEDURE — G0283 ELEC STIM OTHER THAN WOUND: HCPCS

## 2017-11-20 ASSESSMENT — PAIN SCALES - GENERAL: PAINLEVEL_OUTOF10: 5

## 2017-11-20 ASSESSMENT — PAIN DESCRIPTION - PAIN TYPE: TYPE: CHRONIC PAIN

## 2017-11-20 ASSESSMENT — PAIN DESCRIPTION - LOCATION: LOCATION: NECK;SHOULDER

## 2017-11-20 ASSESSMENT — PAIN DESCRIPTION - DESCRIPTORS: DESCRIPTORS: TIGHTNESS

## 2017-11-20 ASSESSMENT — PAIN DESCRIPTION - FREQUENCY: FREQUENCY: CONTINUOUS

## 2017-11-20 ASSESSMENT — PAIN DESCRIPTION - ORIENTATION: ORIENTATION: RIGHT;LEFT

## 2017-11-20 NOTE — PROGRESS NOTES
stretch 5\" x 5  Exercise 8: Backward shoulder rolls 2# wts  x 10  Exercise 9: Hooklying lumbar rotation  x 10  Exercise 10: Multifidus row (green) x 10 ea  Exercise 11: Paloff press (green) x 10 ea  Exercise 12: Prone on elbows  x  1'  Exercise 13: TA contraction series  (alone 5\" x 10), ( alt UE's, alt LE's & alt UE/LE x 10 ea)  Exercise 14: Forward bent row bilateral  2# x 10 ea  Exercise 15: R hip extension isometric at 90-90 x 5 for 5\"  Exercise 16: Box lift  16.5#  x 5--NOT PERFORMED  Exercise 17: leg press @ 96# seat hole 3 x 15  Exercise 18: Is , Ys prone -->standing with green t-band x 10 each  Exercise 19: estim + HP PRN     ----  supine  IFC  x  15 min  to neck and low back  Exercise 20: HEP issued 11/13/2017     Modalities  E-stim (parameters): IFC supine x 15 minutes to neck and low back      Assessment:   Conditions Requiring Skilled Therapeutic Intervention  Body structures, Functions, Activity limitations: Decreased functional mobility ; Decreased ROM; Decreased high-level IADLs  Assessment: Patient performed all exercises on program.  He was agreeable to try E-stim but declined MH and ice. He stated that pain was unchanged post session.   REQUIRES PT FOLLOW UP: Yes        Goals:  Short term goals  Time Frame for Short term goals: 2-3 weeks  Short term goal 1: Patient will be independent with HEP (11/20 reports not performing)  Short term goal 2: Patient will decrease tenderness to palpation L upper trapezius to decrease pain  Short term goal 3: Patient will improve cervical flexion to 0-35 or greater with no pain noted to improve functional mobility  Long term goals  Time Frame for Long term goals : 4-6 weeks  Long term goal 1: Patient will improve score on NPDI to 30% impairment or less to demonstrate decreased disability  Long term goal 2: Patient will improve score on Oswestry to 25% impairment or less to demonstrate decreased disability  Long term goal 3: Patient will report improvement in pain to

## 2017-11-27 ENCOUNTER — HOSPITAL ENCOUNTER (OUTPATIENT)
Dept: PHYSICAL THERAPY | Age: 47
Setting detail: THERAPIES SERIES
Discharge: HOME | End: 2017-11-27
Payer: COMMERCIAL

## 2017-11-27 PROCEDURE — 97110 THERAPEUTIC EXERCISES: CPT

## 2017-11-27 ASSESSMENT — PAIN DESCRIPTION - PAIN TYPE: TYPE: CHRONIC PAIN

## 2017-11-27 ASSESSMENT — PAIN DESCRIPTION - ORIENTATION: ORIENTATION: LEFT

## 2017-11-27 ASSESSMENT — PAIN SCALES - GENERAL: PAINLEVEL_OUTOF10: 6

## 2017-11-27 ASSESSMENT — PAIN DESCRIPTION - LOCATION: LOCATION: NECK;SHOULDER

## 2017-11-27 NOTE — PROGRESS NOTES
Physical Therapy  Daily Treatment Note  Date: 2017  Patient Name: Nael Sifuentes  MRN: [id number]     :   1970    Subjective:   General  Additional Pertinent Hx: Patient presents to therapy with complaints of left sided neck and low back pain. Patient had C5-6. C6-7 ACDF 2017 and has remote history of upper cervical trauma and odontoid process pinning per patient report. Patient states the neck pain has been present since surgery but the pain has lessened compared with prior to surgery. Patient reports 3 month history of back pain. Additional pertinent information includes COPD. Referring Practitioner: Amelie Beasley CNP  PT Visit Information  Onset Date: 10/30/17  PT Insurance Information: [employer] (Precert required)  Total # of Visits Approved: 12  Total # of Visits to Date: 6  Progress Note Due Date: 17  Subjective: my neck and L shoulder are hurting worse than my back is today  Patient Currently in Pain: Yes  Pain Level: 6  Pain Type: Chronic pain  Pain Location: Neck; Shoulder  Pain Orientation: Left       Treatment Activities:      Exercises  Exercise 1: *NO ICT d/t cervical fusion*---------Chin tucks   x 10 for 5\" supine on pillow  Exercise 2: Slouch with overcorrection  x 10  Exercise 3: Scapular retraction  green t-band  x 10  Exercise 4: Left Upper trapezius stretch 10\" x 4  Exercise 5: Left Levator scapula stretch  10\" x 4   Exercise 6: Scalene stretch (rotate R and bend R)  10\" x 4  Exercise 7: Corner stretch 5\" x 5  Exercise 8: Backward shoulder rolls 2# wts  x 10  Exercise 9: Hooklying lumbar rotation  x 10  Exercise 10: Multifidus row (green) x 10 ea  Exercise 11: Paloff press (green) x 10 ea  Exercise 12: Prone on elbows  x  1'  Exercise 13: TA contraction series  (alone 5\" x 10), ( alt UE's, alt LE's & alt UE/LE x 10 ea)  Exercise 14: Forward bent row bilateral  2# x 10 ea  Exercise 15: R hip extension isometric at 90-90 x 5 for 5\"  Exercise 16:  Box lift  16.5#  x 5--NOT

## 2017-11-30 ENCOUNTER — APPOINTMENT (OUTPATIENT)
Dept: PHYSICAL THERAPY | Age: 47
End: 2017-11-30
Payer: COMMERCIAL

## 2017-12-07 ENCOUNTER — HOSPITAL ENCOUNTER (OUTPATIENT)
Dept: PHYSICAL THERAPY | Age: 47
Setting detail: THERAPIES SERIES
Discharge: HOME | End: 2017-12-07
Payer: COMMERCIAL

## 2017-12-07 PROCEDURE — G8982 BODY POS GOAL STATUS: HCPCS

## 2017-12-07 PROCEDURE — 97110 THERAPEUTIC EXERCISES: CPT

## 2017-12-07 PROCEDURE — G8981 BODY POS CURRENT STATUS: HCPCS

## 2017-12-07 ASSESSMENT — PAIN DESCRIPTION - PAIN TYPE: TYPE: CHRONIC PAIN

## 2017-12-07 ASSESSMENT — PAIN DESCRIPTION - ORIENTATION: ORIENTATION: LEFT

## 2017-12-07 ASSESSMENT — PAIN DESCRIPTION - LOCATION: LOCATION: NECK;SHOULDER;BACK

## 2017-12-07 ASSESSMENT — PAIN SCALES - GENERAL: PAINLEVEL_OUTOF10: 5

## 2017-12-07 ASSESSMENT — PAIN DESCRIPTION - DESCRIPTORS: DESCRIPTORS: TIGHTNESS

## 2017-12-07 ASSESSMENT — PAIN DESCRIPTION - FREQUENCY: FREQUENCY: CONTINUOUS

## 2017-12-07 NOTE — PROGRESS NOTES
Physical Therapy  Daily Treatment Note/Reassessment  Date: 2017  Patient Name: Mumtaz Perez  MRN: [id number]     :   1970    Subjective:   General  Chart Reviewed: Yes  Additional Pertinent Hx: Patient presents to therapy with complaints of left sided neck and low back pain. Patient had C5-6. C6-7 ACDF 2017 and has remote history of upper cervical trauma and odontoid process pinning per patient report. Patient states the neck pain has been present since surgery but the pain has lessened compared with prior to surgery. Patient reports 3 month history of back pain. Additional pertinent information includes COPD. Family / Caregiver Present: No  Referring Practitioner: Robin Lucas CNP  PT Visit Information  Onset Date: 10/30/17  PT Insurance Information: [employer] (Gretta Tishapamela required)  Total # of Visits Approved: 12  Total # of Visits to Date: 7  Progress Note Due Date: 18  Subjective  Subjective: Patient states he thinks that therapy is helping, \" maybe more than I thought it would\". He relates that his pain is variable in both intensity and location, with some days his back being better and some days his neck. Pain Screening  Patient Currently in Pain: Yes  Pain Assessment  Pain Assessment: 0-10  Pain Level: 5  Pain Type: Chronic pain  Pain Location: Neck; Shoulder;Back  Pain Orientation: Left  Pain Descriptors: Tightness  Pain Frequency: Continuous  Effect of Pain on Daily Activities: Bending at waist, twisting and turning is difficult, lifting and carrying >30#. Lying on right side is comfortable.    Vital Signs  Patient Currently in Pain: Yes       Treatment Activities:                                      Exercises  Exercise 1: *NO ICT d/t cervical fusion*---------Chin tucks   x 10 for 5\" supine on pillow  Exercise 2: Slouch with overcorrection  x 10  Exercise 3: Scapular retraction  green t-band  x 10  Exercise 4: Left Upper trapezius stretch 10\" x 4  Exercise 5: Left Levator scapula stretch  10\" x 4   Exercise 6: Scalene stretch (rotate R and bend R)  10\" x 4  Exercise 7: Corner stretch 5\" x 5  Exercise 8: Backward shoulder rolls 3# wts  x 20  Exercise 9: Hooklying lumbar rotation  x 10  Exercise 10: Multifidus row (green) x 10 ea  Exercise 11: Paloff press (green) x 10 ea  Exercise 12: Prone on elbows  x  1'  Exercise 13: TA contraction series  (alone 5\" x 10), ( alt UE's, alt LE's & alt UE/LE x 10 ea)  Exercise 14: Forward bent row bilateral  3# x 10 ea  Exercise 15: R hip extension isometric at 90-90 x 5 for 5\"  Exercise 16: Box lift  16.5#  x 5--NOT PERFORMED  Exercise 17: leg press @ 96# seat hole 3, 1 x 15  Exercise 18: Is , Ys prone -->standing with green t-band x 10 each  Exercise 19: estim + HP PRN     ----  supine  IFC  x  15 min  to neck and low back--Declined  Exercise 20: HEP issued 11/13/2017                                   Assessment:   Conditions Requiring Skilled Therapeutic Intervention  Body structures, Functions, Activity limitations: Decreased functional mobility ; Decreased ROM; Decreased high-level IADLs  Assessment: Mr. Melany Multani appears to have made some progress regarding overall pain reduction and increased tolerance for physical activity and demonstrated by the results of today's reassessment. By his admission, he does report he feels he is very slowly improving. He appeared to give good effort with today's session and appears familiar with exercises in his routine. Prognosis: Good;Fair  REQUIRES PT FOLLOW UP: Yes  Discharge Recommendations: Continue to assess pending progress      G-Code:  PT G-Codes  Functional Assessment Tool Used: Neck Pain Disability Index Questionairre  Score: 42% neck, 33% back  Functional Limitation: Changing and maintaining body position  Changing and Maintaining Body Position Current Status ():  At least 40 percent but less than 60 percent impaired, limited or restricted (12/7/17 based upon Oswestry)  Changing and Maintaining Body Position Goal Status (): At least 20 percent but less than 40 percent impaired, limited or restricted  OutComes Score                                           Goals:  Short term goals  Time Frame for Short term goals: 2-3 weeks  Short term goal 1: Patient will be independent with HEP--progress (11/20 reports not performing. 12/7/17 Patient has HEP which he is imtermittently performing)  Short term goal 2: Patient will decrease tenderness to palpation L upper trapezius to decrease pain--progress (12/7/17 Less intense tenderness than at initial eval but less pronounce)  Short term goal 3: Patient will improve cervical flexion to 0-35 or greater with no pain noted to improve functional mobility--met (12/7/17 cervical flexion 35 deg, but with pain at end range)  Long term goals  Time Frame for Long term goals : 4-6 weeks  Long term goal 1: Patient will improve score on NPDI to 30% impairment or less to demonstrate decreased disability. --progress (12/7/17 42% impairment on the NPDI)  Long term goal 2: Patient will improve score on Oswestry to 25% impairment or less to demonstrate decreased disability--not met (12/7/17 33% impairment on the Oswestry)  Long term goal 3: Patient will report improvement in pain to no greater than 2/10 or less at rest to demonstrate improved quality of life--not met (12/7/17 at best is 3/10 at rest)  Long term goal 4: Patient will improve lumbar rotation to 3/4 or greater bilaterally. --met  (12/7/17 Patient has 3/4 full lumbar rotation bilaterally.)  Patient Goals   Patient goals :  To have less pain    Plan:    Plan  Times per week: 2  Plan weeks: 4-6 weeks  Specific instructions for Next Treatment: Potential need for SI belt  Current Treatment Recommendations: Strengthening, ROM, Functional Mobility Training, Neuromuscular Re-education, Manual Therapy - Soft Tissue Mobilization, Manual Therapy - Joint Manipulation, Home Exercise Program, Patient/Caregiver Education & Training,

## 2017-12-08 RX ORDER — OXYCODONE AND ACETAMINOPHEN 10; 325 MG/1; MG/1
1 TABLET ORAL EVERY 6 HOURS PRN
Qty: 60 TABLET | Refills: 0 | Status: SHIPPED | OUTPATIENT
Start: 2017-12-11 | End: 2018-01-12 | Stop reason: SDUPTHER

## 2017-12-12 ENCOUNTER — HOSPITAL ENCOUNTER (OUTPATIENT)
Dept: PAIN MANAGEMENT | Age: 47
Discharge: HOME | End: 2017-12-12
Payer: COMMERCIAL

## 2017-12-12 ENCOUNTER — HOSPITAL ENCOUNTER (OUTPATIENT)
Dept: GENERAL RADIOLOGY | Age: 47
Discharge: HOME | End: 2017-12-12
Payer: COMMERCIAL

## 2017-12-12 VITALS
BODY MASS INDEX: 27.86 KG/M2 | HEIGHT: 71 IN | OXYGEN SATURATION: 97 % | TEMPERATURE: 98.6 F | WEIGHT: 199 LBS | SYSTOLIC BLOOD PRESSURE: 140 MMHG | HEART RATE: 106 BPM | RESPIRATION RATE: 18 BRPM | DIASTOLIC BLOOD PRESSURE: 81 MMHG

## 2017-12-12 DIAGNOSIS — M50.10 CERVICAL DISC DISORDER WITH RADICULOPATHY: ICD-10-CM

## 2017-12-12 DIAGNOSIS — M54.2 NECK PAIN: Chronic | ICD-10-CM

## 2017-12-12 DIAGNOSIS — G89.29 CHRONIC RIGHT SHOULDER PAIN: ICD-10-CM

## 2017-12-12 DIAGNOSIS — M25.511 CHRONIC RIGHT SHOULDER PAIN: ICD-10-CM

## 2017-12-12 PROCEDURE — 73030 X-RAY EXAM OF SHOULDER: CPT

## 2017-12-12 PROCEDURE — 99213 OFFICE O/P EST LOW 20 MIN: CPT

## 2017-12-12 ASSESSMENT — ACTIVITIES OF DAILY LIVING (ADL): EFFECT OF PAIN ON DAILY ACTIVITIES: LIMITS ACTIVITIES

## 2017-12-12 ASSESSMENT — PAIN DESCRIPTION - FREQUENCY: FREQUENCY: CONTINUOUS

## 2017-12-12 ASSESSMENT — PAIN DESCRIPTION - PROGRESSION: CLINICAL_PROGRESSION: NOT CHANGED

## 2017-12-12 ASSESSMENT — PAIN DESCRIPTION - DESCRIPTORS: DESCRIPTORS: RADIATING;ACHING;CONSTANT

## 2017-12-12 ASSESSMENT — PAIN DESCRIPTION - LOCATION: LOCATION: NECK;SHOULDER;BACK

## 2017-12-12 ASSESSMENT — PAIN DESCRIPTION - ONSET: ONSET: ON-GOING

## 2017-12-12 ASSESSMENT — PAIN DESCRIPTION - PAIN TYPE: TYPE: CHRONIC PAIN

## 2017-12-12 ASSESSMENT — PAIN SCALES - GENERAL: PAINLEVEL_OUTOF10: 6

## 2017-12-12 ASSESSMENT — PAIN DESCRIPTION - ORIENTATION: ORIENTATION: RIGHT;LEFT;LOWER

## 2017-12-12 NOTE — PROGRESS NOTES
Luly Mcneil/Linda  Patient Pain Assessment  Progress Note       Chief Complaint   Patient presents with    Neck Pain    Shoulder Pain     bilateral     Hip Pain     right     Pain Assessment  Pain Assessment: 0-10  Pain Level: 6  Pain Type: Chronic pain  Pain Location: Neck, Shoulder, Back  Pain Orientation: Right, Left, Lower  Pain Radiating Towards: neck pain down into shoulders and back pain into right hip  Pain Descriptors: Radiating, Aching, Constant  Pain Frequency: Continuous  Pain Onset: On-going  Clinical Progression: Not changed  Effect of Pain on Daily Activities: limits activities         []  Issues of Concern:     [x]  Reports current medication is helping, but continues to have on-going pain    [x]  Reports current pain medication increases ability to do activities of daily living     [x]  Current narcotic medications    [x]  Discussed possible medication side effects, risk of tolerance and/or dependence, alternative treatments    [x]  Encouraged to set goals of decreasing daily narcotic intake    [x]  Discussed effects of long term narcotic use      [x]  Injection options discussed, lumbar MRI reviewed, shoulder XRs done and injections scheduled for next visit (see plan below)     []Yes [x]No  Current medication side effects, comment if applicable:      []Yes [x]No   Acute bladder or bowel changes    Previous Procedure / Percentage of pain control / Imaging / PT History:  Percentage of Pain Relief after Cervical Epidural Steroid injection:  <50 %  How long lasted: 0 days     Radiology exams received during the last 12 months: Bilateral Shoulder XRs    When December 2017                                              Where [facility]  Imaging on chart: Yes    MRI exams received in the past 2 years: Lumbar MRI in Sept 2017 and Left Shoulder in November 2016 both at [facility]  Physical therapy during the last 6 months: Yes  When: Currently                                            Where: [facility] name: N/A    Number of children: N/A    Years of education: N/A     Social History Main Topics    Smoking status: Former Smoker     Packs/day: 1.00     Years: 30.00     Types: Cigarettes     Quit date: 2017    Smokeless tobacco: Never Used    Alcohol use Yes      Comment: occasionally    Drug use: No    Sexual activity: Not Currently     Partners: Female     Other Topics Concern    Not on file     Social History Narrative    No narrative on file                                                            Past Medical History:       Diagnosis Date    Chronic back pain     sciatica    COPD (chronic obstructive pulmonary disease) ([address]     MVP (mitral valve prolapse)     Neck pain     HX vertebral fx with hardware       Surgical History:  Past Surgical History:   Procedure Laterality Date    CERVICAL DISC ARTHROPLASTY N/A 3/1/2017    ACDF C5-6, C6-7 WITH ALLOGRAFT BONE AND ANTERIOR CERVICAL PLATING  performed by Kaye Cam DO at [address]      TONSILLECTOMY AND ADENOIDECTOMY       Family History:  family history includes Cancer in his father, maternal grandmother, and mother. Allergies:  Pcn [penicillins]     Medications:  Current Outpatient Prescriptions   Medication Sig Dispense Refill    oxyCODONE-acetaminophen (PERCOCET)  MG per tablet Take 1 tablet by mouth every 6 hours as needed for Pain . Earliest Fill Date: 12/11/17 60 tablet 0    dicyclomine (BENTYL) 20 MG tablet Take 1 tablet by mouth 3 times daily as needed (abdominal spasms) 30 tablet 0    levocetirizine (XYZAL) 5 MG tablet Take 1 tablet by mouth nightly 30 tablet 1    Acetaminophen (TYLENOL 8 HOUR PO) Take by mouth      cyclobenzaprine (FLEXERIL) 10 MG tablet Take 10 mg by mouth 3 times daily as needed for Muscle spasms       No current facility-administered medications for this encounter.       UDS:    Lab Results   Component Value Date    AMPHETUR Negative 10/25/2017    BARBITURATES Negative 10/25/2017    BENZODIAZURI Negative 10/25/2017    CANNANBINURI Negative 10/25/2017    COCAINEMETUR Negative 10/25/2017    OPIAU Negative 10/25/2017    OXYCOOXYMO Positive * (Percocoet per med list) 10/25/2017    PHENCYCU Negative 10/25/2017    LABMETH Negative 10/25/2017    PPXUR Negative 10/25/2017    MEPERIDU Negative 10/25/2017    FENTU Negative 10/25/2017    ETHUQN Negative 10/25/2017          Vitals:  /81   Pulse 106   Temp 98.6 °F (37 °C) (Temporal)   Resp 18   Ht 5' 11\" (1.803 m)   Wt 199 lb (90.3 kg)   SpO2 97%   BMI 27.75 kg/m²      Physical Exam:  General appearance: no acute distress  Head: NCAT, EOMI  Skin: Warm, Dry   Musculoskeletal: ambulatory per self, steady gait  Neurologic: alert and oriented X 3, speech clear  Mood and affect: appropriate, no SI or HI    Assessment:    *     Lumbar DDD  *     Lumbar Facet Syndrome  *     Osteoarthritis, bilateral shoulders    Plan:   [x]  Patient is to call with any questions or concerns which may arise prior to the next office visit    [x]  Continue current medications per our office, see medication tab, LALIT reviewed   []  Add   []  Imaging order given to patient   []  PT order given to patient   [x]  Procedure scheduled for next visit, see encounter details (Lumbar Facet injections L4-5, L5-S1 and Bilateral Shoulder Steroid injections)   []  UDS done today   []  UDS next visit   []  . .. Controlled Substance Monitoring: Discussed with patient possible medication side effects, risk of tolerance and/or dependence, and alternative treatments. Discussed the effects of long term narcotic use. Patient encouraged to set daily goals of exercising and decreasing daily narcotic intake.       Electronically signed by NOEMI Gipson

## 2017-12-14 ENCOUNTER — HOSPITAL ENCOUNTER (OUTPATIENT)
Dept: PHYSICAL THERAPY | Age: 47
Setting detail: THERAPIES SERIES
Discharge: HOME | End: 2017-12-14
Payer: COMMERCIAL

## 2017-12-14 PROCEDURE — 97110 THERAPEUTIC EXERCISES: CPT

## 2017-12-14 ASSESSMENT — PAIN SCALES - GENERAL: PAINLEVEL_OUTOF10: 5

## 2017-12-14 ASSESSMENT — PAIN DESCRIPTION - PAIN TYPE: TYPE: CHRONIC PAIN

## 2017-12-14 ASSESSMENT — PAIN DESCRIPTION - LOCATION: LOCATION: BACK

## 2017-12-14 NOTE — PROGRESS NOTES
Physical Therapy  Daily Treatment Note  Date: 2017  Patient Name: Yue Esqueda  MRN: [id number]     :   1970    Subjective:   General  Additional Pertinent Hx: Patient presents to therapy with complaints of left sided neck and low back pain. Patient had C5-6. C6-7 ACDF 2017 and has remote history of upper cervical trauma and odontoid process pinning per patient report. Patient states the neck pain has been present since surgery but the pain has lessened compared with prior to surgery. Patient reports 3 month history of back pain. Additional pertinent information includes COPD. Referring Practitioner: Kobe Barbour CNP  PT Visit Information  Onset Date: 10/30/17  PT Insurance Information: [employer] (Yasmin 115[address] required)  Total # of Visits Approved: 12  Total # of Visits to Date: 8  Plan of Care/Certification Expiration Date: 18  Progress Note Due Date: 18  Subjective: my shoulders aren't too bad today it's more my back  Patient Currently in Pain: Yes  Pain Level: 5  Pain Type: Chronic pain  Pain Location: Back       Treatment Activities:     Exercises  Exercise 1: *NO ICT d/t cervical fusion*---------Chin tucks   x 10 for 5\" supine on pillow  Exercise 2: Slouch with overcorrection  x 10  Exercise 3: Scapular retraction  green t-band  x 15  Exercise 4: Left Upper trapezius stretch 10\" x 5  Exercise 5: Left Levator scapula stretch  10\" x 5  Exercise 6: Scalene stretch (rotate R and bend R)  10\" x 5  Exercise 7: Corner stretch 5\" x 5  Exercise 8: Backward shoulder rolls 3# wts  x 20  Exercise 9: Hooklying lumbar rotation  x 10  Exercise 10: Multifidus row (green) x 10 ea  Exercise 11: Paloff press (green) x 10 ea  Exercise 12: Prone on elbows  x  2'  Exercise 13: TA contraction series  (alone 5\" x 10), ( alt UE's, alt LE's & alt UE/LE x 10 ea)  Exercise 14: Forward bent row bilateral  3# x 10 ea  Exercise 15: R hip extension isometric at 90-90 x 5 for 5\"  Exercise 16:  Box lift  16.5#  x

## 2017-12-18 ENCOUNTER — HOSPITAL ENCOUNTER (OUTPATIENT)
Dept: PHYSICAL THERAPY | Age: 47
Setting detail: THERAPIES SERIES
Discharge: HOME | End: 2017-12-18
Payer: COMMERCIAL

## 2017-12-18 PROCEDURE — 97110 THERAPEUTIC EXERCISES: CPT

## 2017-12-18 ASSESSMENT — PAIN SCALES - GENERAL: PAINLEVEL_OUTOF10: 4

## 2017-12-18 ASSESSMENT — PAIN DESCRIPTION - LOCATION: LOCATION: BACK;HIP

## 2017-12-18 ASSESSMENT — PAIN DESCRIPTION - ORIENTATION: ORIENTATION: RIGHT;LOWER

## 2017-12-18 ASSESSMENT — PAIN DESCRIPTION - PAIN TYPE: TYPE: CHRONIC PAIN

## 2017-12-20 ENCOUNTER — APPOINTMENT (OUTPATIENT)
Dept: PHYSICAL THERAPY | Age: 47
End: 2017-12-20
Payer: COMMERCIAL

## 2017-12-21 ENCOUNTER — HOSPITAL ENCOUNTER (OUTPATIENT)
Dept: PHYSICAL THERAPY | Age: 47
Setting detail: THERAPIES SERIES
Discharge: HOME | End: 2017-12-21
Payer: COMMERCIAL

## 2017-12-21 PROCEDURE — 97110 THERAPEUTIC EXERCISES: CPT

## 2017-12-21 ASSESSMENT — PAIN DESCRIPTION - LOCATION: LOCATION: BACK;HIP;LEG

## 2017-12-21 ASSESSMENT — PAIN DESCRIPTION - PAIN TYPE: TYPE: CHRONIC PAIN

## 2017-12-21 ASSESSMENT — PAIN DESCRIPTION - ORIENTATION: ORIENTATION: LEFT;LOWER

## 2017-12-21 ASSESSMENT — PAIN SCALES - GENERAL: PAINLEVEL_OUTOF10: 6

## 2017-12-21 NOTE — PROGRESS NOTES
ea  Exercise 15: R hip extension isometric at 90-90 x 5 for 5\"  Exercise 16: Box lift  16.5#  x 5--NOT PERFORMED  Exercise 17: leg press @ 96# seat hole 3, 1 x 15  Exercise 18: Is , Ys ->standing with green t-band x 10 each  Exercise 19: estim + HP PRN     ----  supine  IFC  x  15 min  to neck and low back--Declined  Exercise 20: HEP issued 11/13/2017      Assessment:   Conditions Requiring Skilled Therapeutic Intervention  Body structures, Functions, Activity limitations: Decreased functional mobility ; Decreased ROM; Decreased high-level IADLs  Assessment: states post therapy his back pain is about the same but his neck and shoulders were bothering him more than when he came in for therapy  REQUIRES PT FOLLOW UP: Yes      G-Code:     Goals:  Short term goals  Time Frame for Short term goals: 2-3 weeks  Short term goal 1: Patient will be independent with HEP--progress (11/20 reports not performing. 12/7/17 Patient has HEP which he is imtermittently performing)  Short term goal 2: Patient will decrease tenderness to palpation L upper trapezius to decrease pain--progress (12/7/17 Less intense tenderness than at initial eval but less pronounce)  Short term goal 3: Patient will improve cervical flexion to 0-35 or greater with no pain noted to improve functional mobility--met (12/7/17 cervical flexion 35 deg, but with pain at end range)  Long term goals  Time Frame for Long term goals : 4-6 weeks  Long term goal 1: Patient will improve score on NPDI to 30% impairment or less to demonstrate decreased disability. --progress (12/7/17 42% impairment on the NPDI)  Long term goal 2: Patient will improve score on Oswestry to 25% impairment or less to demonstrate decreased disability--not met (12/7/17 33% impairment on the Oswestry)  Long term goal 3: Patient will report improvement in pain to no greater than 2/10 or less at rest to demonstrate improved quality of life--not met (12/7/17 at best is 3/10 at rest)  Long term goal 4:

## 2017-12-26 ENCOUNTER — HOSPITAL ENCOUNTER (OUTPATIENT)
Dept: PHYSICAL THERAPY | Age: 47
Setting detail: THERAPIES SERIES
Discharge: HOME | End: 2017-12-26
Payer: COMMERCIAL

## 2017-12-26 PROCEDURE — 97110 THERAPEUTIC EXERCISES: CPT

## 2017-12-26 ASSESSMENT — PAIN SCALES - GENERAL: PAINLEVEL_OUTOF10: 6

## 2017-12-26 ASSESSMENT — PAIN DESCRIPTION - LOCATION: LOCATION: BACK;NECK

## 2017-12-26 ASSESSMENT — PAIN DESCRIPTION - PAIN TYPE: TYPE: CHRONIC PAIN

## 2017-12-26 ASSESSMENT — PAIN DESCRIPTION - ORIENTATION: ORIENTATION: LOWER

## 2017-12-26 NOTE — PROGRESS NOTES
will improve lumbar rotation to 3/4 or greater bilaterally. --met  (12/7/17 Patient has 3/4 full lumbar rotation bilaterally.)  Patient Goals   Patient goals :  To have less pain    Plan:    Plan  Times per week: 2  Plan weeks: 4-6 weeks  Specific instructions for Next Treatment: Potential need for SI belt  Current Treatment Recommendations: Strengthening, ROM, Functional Mobility Training, Neuromuscular Re-education, Manual Therapy - Soft Tissue Mobilization, Manual Therapy - Joint Manipulation, Home Exercise Program, Patient/Caregiver Education & Training, Modalities        Therapy Time   Individual Concurrent Group Co-treatment   Time In [id number] [facility] [address]         Time Out 1522         [facility]    Electronically signed by Nawaf Lopez PTA on 12/26/2017 at 3:24 PM

## 2017-12-28 ENCOUNTER — HOSPITAL ENCOUNTER (OUTPATIENT)
Dept: PHYSICAL THERAPY | Age: 47
Setting detail: THERAPIES SERIES
Discharge: HOME | End: 2017-12-28
Payer: COMMERCIAL

## 2017-12-28 PROCEDURE — 97110 THERAPEUTIC EXERCISES: CPT

## 2017-12-28 PROCEDURE — G8982 BODY POS GOAL STATUS: HCPCS

## 2017-12-28 PROCEDURE — G8981 BODY POS CURRENT STATUS: HCPCS

## 2017-12-28 ASSESSMENT — PAIN DESCRIPTION - PAIN TYPE: TYPE: CHRONIC PAIN

## 2017-12-28 ASSESSMENT — PAIN DESCRIPTION - ORIENTATION: ORIENTATION: LEFT

## 2017-12-28 ASSESSMENT — PAIN SCALES - GENERAL: PAINLEVEL_OUTOF10: 5

## 2017-12-28 ASSESSMENT — PAIN DESCRIPTION - LOCATION: LOCATION: NECK;SHOULDER

## 2017-12-28 NOTE — PROGRESS NOTES
Physical Therapy  Daily Treatment Note  Date: 2017  Patient Name: Sherman Nieves  MRN: [id number]     :   1970    Subjective:   General  Additional Pertinent Hx: Patient presents to therapy with complaints of left sided neck and low back pain. Patient had C5-6. C6-7 ACDF 2017 and has remote history of upper cervical trauma and odontoid process pinning per patient report. Patient states the neck pain has been present since surgery but the pain has lessened compared with prior to surgery. Patient reports 3 month history of back pain. Additional pertinent information includes COPD. Referring Practitioner: Beverly Milan CNP  PT Visit Information  Onset Date: 10/30/17  PT Insurance Information: [employer] (Frederic dailey)  Total # of Visits Approved: 12  Total # of Visits to Date: 12  Plan of Care/Certification Expiration Date: 18  Progress Note Due Date: 18  Subjective: my neck and L shoulder are bothering me more than my back today. Patient Currently in Pain: Yes  Pain Level: 5  Pain Type: Chronic pain  Pain Location: Neck; Shoulder  Pain Orientation: Left       Treatment Activities:      Exercises  Exercise 1: *NO ICT d/t cervical fusion*---------Chin tucks   x 10 for 5\" supine on pillow  Exercise 2: Slouch with overcorrection  x 10  Exercise 3: Scapular retraction  green t-band  x 15  Exercise 4: Left Upper trapezius stretch 10\" x 5  Exercise 5: Left Levator scapula stretch  10\" x 5  Exercise 6: Scalene stretch (rotate R and bend R)  10\" x 5  Exercise 7: Corner stretch 5\" x 5  Exercise 8: Backward shoulder rolls 3# wts  x 20  Exercise 9: Hooklying lumbar rotation  x 10  Exercise 10: Multifidus row (green) x 10 ea  Exercise 11: Paloff press (green) x 10 ea  Exercise 12: Prone on elbows  x  2'  Exercise 13: TA contraction series  (alone 5\" x 10), ( alt UE's, alt LE's & alt UE/LE x 10 ea)  Exercise 14:  Forward bent row bilateral  3# x 10 ea  Exercise 15: R hip extension isometric at 90-90 x 5 for 5\"  Exercise 16: Box lift  16.5#  x 5--NOT PERFORMED  Exercise 17: leg press @ 96# seat hole 3, 1 x 15  Exercise 18: Is , Ys ->standing with green t-band x 10 each  Exercise 19: estim + HP PRN     ----  supine  IFC  x  15 min  to neck and low back--Declined  Exercise 20: HEP issued 11/13/2017      Assessment:   Conditions Requiring Skilled Therapeutic Intervention  Body structures, Functions, Activity limitations: Decreased functional mobility ; Decreased ROM; Decreased high-level IADLs  Assessment: Goals assessed and findings/measurenents recorded in goal section, encouraged patient to become more consistent performing HEP after d/c today from therapy      G-Code:    Goals:  Short term goals  Time Frame for Short term goals: 2-3 weeks  Short term goal 1: Patient will be independent with HEP--progress (11/20 reports not performing. 12/7/17 Patient has HEP which he is imtermittently performing. 12/28: still performing intermittenly)  Short term goal 2: Patient will decrease tenderness to palpation L upper trapezius to decrease pain--progress (12/7/17 Less intense tenderness than at initial eval but less pronounce. 12/28: still has some tenderness but much less than on initial evaluation)  Short term goal 3: Patient will improve cervical flexion to 0-35 or greater with no pain noted to improve functional mobility--met (12/7/17 cervical flexion 35 deg, but with pain at end range. 12/28:  37 deg )  Long term goals  Time Frame for Long term goals : 4-6 weeks  Long term goal 1: Patient will improve score on NPDI to 30% impairment or less to demonstrate decreased disability. --progress (12/7/17 42% impairment on the NPDI. 12/28:  42% disability)  Long term goal 2: Patient will improve score on Oswestry to 25% impairment or less to demonstrate decreased disability--not met (12/7/17 33% impairment on the Oswestry.   12/28:  31.1% disability)  Long term goal 3: Patient will report improvement in pain to no greater

## 2017-12-29 NOTE — PROGRESS NOTES
[facility]  OUTPATIENT PHYSICAL THERAPY  DISCHARGE SUMMARY    Date: 2017  Patient Name: Luz Fowler        MRN: [id number]    ACCOUNT #: [de-identified]  : 1970  (56 y.o.)  Gender: male   Referring Practitioner: Gianni Gilbert CNP  Diagnosis: Acute midline low back pain with sciatica (M54.42), Left leg pain (M79.605), S/P cervical spinal fusion (Z98.1), neck pain (M54.2)  Referral Date : 10/17/17       Total # of Visits Approved: 12  Total # of Visits to Date: 12    Subjective  Subjective: my neck and L shoulder are bothering me more than my back today. Additional Pertinent Hx: Patient presents to therapy with complaints of left sided neck and low back pain. Patient had C5-6. C6-7 ACDF 2017 and has remote history of upper cervical trauma and odontoid process pinning per patient report. Patient states the neck pain has been present since surgery but the pain has lessened compared with prior to surgery. Patient reports 3 month history of back pain. Additional pertinent information includes COPD. Objective  Treatments received include: therapeutic exercise, home exercise program, electrical stimulation, moist heat pack   See objective/subjective data in goals    Assessment  Assessment: Goals assessed and findings/measurenents recorded in goal section, encouraged patient to become more consistent performing HEP after d/c today from therapy Patient met 2/8 goals in therapy and made progress toward others. Plan  Patient is to be discharged to home independently with home exercise program.          Goals  Short term goals  Time Frame for Short term goals: 2-3 weeks  Short term goal 1: Patient will be independent with HEP--progress ( reports not performing. 17 Patient has HEP which he is imtermittently performing.   : still performing intermittenly)  Short term goal 2: Patient will decrease tenderness to palpation L upper trapezius to decrease pain--progress (17

## 2018-01-12 ENCOUNTER — HOSPITAL ENCOUNTER (OUTPATIENT)
Dept: PAIN MANAGEMENT | Age: 48
Discharge: HOME | End: 2018-01-12
Payer: COMMERCIAL

## 2018-01-12 VITALS
RESPIRATION RATE: 18 BRPM | WEIGHT: 198 LBS | DIASTOLIC BLOOD PRESSURE: 66 MMHG | BODY MASS INDEX: 27.72 KG/M2 | TEMPERATURE: 97.8 F | OXYGEN SATURATION: 99 % | SYSTOLIC BLOOD PRESSURE: 121 MMHG | HEART RATE: 80 BPM | HEIGHT: 71 IN

## 2018-01-12 DIAGNOSIS — M50.10 CERVICAL DISC DISORDER WITH RADICULOPATHY: ICD-10-CM

## 2018-01-12 DIAGNOSIS — M47.819 FACET ARTHROPATHY: ICD-10-CM

## 2018-01-12 DIAGNOSIS — M47.816 LUMBAR FACET ARTHROPATHY: Chronic | ICD-10-CM

## 2018-01-12 PROCEDURE — 3209999900 FLUORO FOR SURGICAL PROCEDURES

## 2018-01-12 PROCEDURE — 64493 INJ PARAVERT F JNT L/S 1 LEV: CPT

## 2018-01-12 PROCEDURE — 2500000003 HC RX 250 WO HCPCS

## 2018-01-12 PROCEDURE — 6360000002 HC RX W HCPCS

## 2018-01-12 PROCEDURE — 64494 INJ PARAVERT F JNT L/S 2 LEV: CPT

## 2018-01-12 RX ORDER — IBUPROFEN AND FAMOTIDINE 26.6; 8 MG/1; MG/1
1 TABLET, FILM COATED ORAL 2 TIMES DAILY PRN
Qty: 60 TABLET | Refills: 2 | Status: SHIPPED | OUTPATIENT
Start: 2018-01-12 | End: 2018-02-06 | Stop reason: ALTCHOICE

## 2018-01-12 RX ORDER — BUPIVACAINE HYDROCHLORIDE 5 MG/ML
INJECTION, SOLUTION EPIDURAL; INTRACAUDAL
Status: COMPLETED | OUTPATIENT
Start: 2018-01-12 | End: 2018-01-12

## 2018-01-12 RX ORDER — OXYCODONE AND ACETAMINOPHEN 10; 325 MG/1; MG/1
1 TABLET ORAL EVERY 6 HOURS PRN
Qty: 60 TABLET | Refills: 0 | Status: SHIPPED | OUTPATIENT
Start: 2018-01-12 | End: 2018-01-12 | Stop reason: SDUPTHER

## 2018-01-12 RX ORDER — DESIPRAMINE HYDROCHLORIDE 25 MG/1
25 TABLET ORAL NIGHTLY
Qty: 30 TABLET | Refills: 3 | Status: SHIPPED | OUTPATIENT
Start: 2018-01-12 | End: 2018-03-26 | Stop reason: ALTCHOICE

## 2018-01-12 RX ORDER — OXYCODONE AND ACETAMINOPHEN 10; 325 MG/1; MG/1
1 TABLET ORAL EVERY 8 HOURS PRN
Qty: 90 TABLET | Refills: 0 | Status: SHIPPED | OUTPATIENT
Start: 2018-01-12 | End: 2018-02-08 | Stop reason: SDUPTHER

## 2018-01-12 RX ORDER — TRIAMCINOLONE ACETONIDE 40 MG/ML
INJECTION, SUSPENSION INTRA-ARTICULAR; INTRAMUSCULAR
Status: COMPLETED | OUTPATIENT
Start: 2018-01-12 | End: 2018-01-12

## 2018-01-12 RX ADMIN — BUPIVACAINE HYDROCHLORIDE 9 ML: 5 INJECTION, SOLUTION EPIDURAL; INTRACAUDAL at 15:09

## 2018-01-12 RX ADMIN — TRIAMCINOLONE ACETONIDE 40 MG: 40 INJECTION, SUSPENSION INTRA-ARTICULAR; INTRAMUSCULAR at 15:09

## 2018-01-12 ASSESSMENT — PAIN - FUNCTIONAL ASSESSMENT: PAIN_FUNCTIONAL_ASSESSMENT: 0-10

## 2018-01-12 ASSESSMENT — ACTIVITIES OF DAILY LIVING (ADL): EFFECT OF PAIN ON DAILY ACTIVITIES: LIMITS ACTIVITY

## 2018-01-12 ASSESSMENT — PAIN DESCRIPTION - DESCRIPTORS: DESCRIPTORS: ACHING;CONSTANT;RADIATING

## 2018-01-12 NOTE — PROGRESS NOTES
Procedure:  Level of Consciousness: [x]Alert [x]Oriented []Disoriented []Lethargic  Anxiety Level: [x]Calm []Anxious []Depressed []Other  Skin: [x]Warm [x]Dry []Cool []Moist []Intact []Other  Cardiovascular: []Palpitations: [x]Never []Occasionally []Frequently  Chest Pain: [x]No []Yes  Respiratory:  [x]Unlabored []Labored []Cough ([] Productive []Unproductive)  HCG Required: [x]No []Yes   Results: []Negative []Positive  Knowledge Level:        [x]Patient/Other verbalized understanding of pre-procedure instructions. []Assessment of post-op care needs (transportation, responsible caregiver)        []Able to discuss health care problems and how to deal with it.   Factors that Affect Teaching:        Language Barrier: [x]No []Yes - why:        Hearing Loss:        [x]No []Yes            Corrective Device:  []Yes []No        Vision Loss:           []No [x]Yes            Corrective Device:  [x]Yes []No        Memory Loss:       [x]No []Yes            []Short Term []Long Term  Motivational Level:  [x]Asks Questions                  []Extremely Anxious       [x]Seems Interested               []Seems Uninterested                  [x]Denies need for Education  Risk for Injury:  [x]Patient oriented to person, place and time  []History of frequent falls/loss of balance  Nutritional:  []Change in appetite   []Weight Gain   []Weight Loss  Functional:  []Requires assistance with ADL'sNursing Admission Record    Current Issues / Falls / ER Visits:  No    Percentage of Pain Relief after Last Procedure:  0 %    How long lasted:  0 days    Radiology exams received during the last 12 months: Yes       When 12/2017                                              Where [facility]       Imaging on chart: Yes         Imaging records requested: No  MRI exams received in the past 2 years:  Yes MRI Lumbar Spine 9/2017 @ [facility]  Physical therapy during the last 6 months: Yes       When: 1/2018                                             Where [facility] during the last 12 months: Yes    Education Provided:  [x] Review of Rene Gilbert  [x] Agreement Review  [x] Compliance Issues Discussed    [] Cognitive Behavior Needs [x] Exercise [] Review of Test [] Financial Issues  [x] Tobacco/Alcohol Use [x] Teaching [] New Patient [] Picture Obtained    Physician Plan:  [] Outgoing Referral  [] Pharmacy Consult  [] Test Ordered   [] Obtained Test Results / Consult Notes  [] UDS due at next visit, verified per EPIC      [] Suspected Physical Abuse or Suicide Risk assessed - IF YES COMPLETE QUESTIONS BELOW    If any of the following questions are answered yes - contact attending physician for referral:    Has been considering harming self to escape stress, pain problems? [] YES  [x] NO  Has a suicide plan? [] YES  [x] NO  Has attempted suicide in the past?   [] YES  [x] NO  Has a close friend or family member who committed suicide? [] YES  [x] NO    Patient Referred To :      Additional Notes:    Assessment Completed by:  Electronically signed by Noemi Sheikh RN on 1/12/2018 at 2:09 PM

## 2018-02-06 ENCOUNTER — OFFICE VISIT (OUTPATIENT)
Dept: FAMILY MEDICINE CLINIC | Age: 48
End: 2018-02-06
Payer: COMMERCIAL

## 2018-02-06 VITALS
DIASTOLIC BLOOD PRESSURE: 82 MMHG | TEMPERATURE: 98.3 F | SYSTOLIC BLOOD PRESSURE: 128 MMHG | OXYGEN SATURATION: 98 % | RESPIRATION RATE: 20 BRPM | HEART RATE: 90 BPM | BODY MASS INDEX: 26.6 KG/M2 | HEIGHT: 71 IN | WEIGHT: 190 LBS

## 2018-02-06 DIAGNOSIS — R59.9 LYMPH NODE ENLARGEMENT: ICD-10-CM

## 2018-02-06 DIAGNOSIS — R73.09 ELEVATED GLUCOSE: ICD-10-CM

## 2018-02-06 DIAGNOSIS — K13.79 MOUTH SORE: Primary | ICD-10-CM

## 2018-02-06 DIAGNOSIS — K13.79 MOUTH SORE: ICD-10-CM

## 2018-02-06 DIAGNOSIS — Z12.11 SCREENING FOR COLON CANCER: ICD-10-CM

## 2018-02-06 DIAGNOSIS — B37.0 ORAL THRUSH: ICD-10-CM

## 2018-02-06 LAB
ALBUMIN SERPL-MCNC: 4.3 G/DL (ref 3.5–5.2)
ALP BLD-CCNC: 69 U/L (ref 40–130)
ALT SERPL-CCNC: 15 U/L (ref 5–41)
ANION GAP SERPL CALCULATED.3IONS-SCNC: 14 MMOL/L (ref 7–19)
AST SERPL-CCNC: 18 U/L (ref 5–40)
BASOPHILS ABSOLUTE: 0 K/UL (ref 0–0.2)
BASOPHILS RELATIVE PERCENT: 0.3 % (ref 0–1)
BILIRUB SERPL-MCNC: 0.3 MG/DL (ref 0.2–1.2)
BUN BLDV-MCNC: 8 MG/DL (ref 6–20)
CALCIUM SERPL-MCNC: 9.6 MG/DL (ref 8.6–10)
CHLORIDE BLD-SCNC: 102 MMOL/L (ref 98–111)
CO2: 25 MMOL/L (ref 22–29)
CREAT SERPL-MCNC: 0.7 MG/DL (ref 0.5–1.2)
EOSINOPHILS ABSOLUTE: 0.1 K/UL (ref 0–0.6)
EOSINOPHILS RELATIVE PERCENT: 0.5 % (ref 0–5)
GFR NON-AFRICAN AMERICAN: >60
GLUCOSE BLD-MCNC: 95 MG/DL (ref 74–109)
HBA1C MFR BLD: 5.1 %
HCT VFR BLD CALC: 48.8 % (ref 42–52)
HEMOGLOBIN: 15.7 G/DL (ref 14–18)
LYMPHOCYTES ABSOLUTE: 2.8 K/UL (ref 1.1–4.5)
LYMPHOCYTES RELATIVE PERCENT: 23.9 % (ref 20–40)
MCH RBC QN AUTO: 29.4 PG (ref 27–31)
MCHC RBC AUTO-ENTMCNC: 32.2 G/DL (ref 33–37)
MCV RBC AUTO: 91.4 FL (ref 80–94)
MONOCYTES ABSOLUTE: 0.8 K/UL (ref 0–0.9)
MONOCYTES RELATIVE PERCENT: 6.5 % (ref 0–10)
NEUTROPHILS ABSOLUTE: 8 K/UL (ref 1.5–7.5)
NEUTROPHILS RELATIVE PERCENT: 68.5 % (ref 50–65)
PDW BLD-RTO: 12.4 % (ref 11.5–14.5)
PLATELET # BLD: 307 K/UL (ref 130–400)
PMV BLD AUTO: 10 FL (ref 9.4–12.4)
POTASSIUM SERPL-SCNC: 4.6 MMOL/L (ref 3.5–5)
RBC # BLD: 5.34 M/UL (ref 4.7–6.1)
SODIUM BLD-SCNC: 141 MMOL/L (ref 136–145)
TOTAL PROTEIN: 7.6 G/DL (ref 6.6–8.7)
WBC # BLD: 11.7 K/UL (ref 4.8–10.8)

## 2018-02-06 PROCEDURE — 99214 OFFICE O/P EST MOD 30 MIN: CPT | Performed by: NURSE PRACTITIONER

## 2018-02-06 RX ORDER — SULFAMETHOXAZOLE AND TRIMETHOPRIM 800; 160 MG/1; MG/1
1 TABLET ORAL 2 TIMES DAILY
Qty: 20 TABLET | Refills: 0 | Status: SHIPPED | OUTPATIENT
Start: 2018-02-06 | End: 2018-02-16

## 2018-02-06 ASSESSMENT — PATIENT HEALTH QUESTIONNAIRE - PHQ9
SUM OF ALL RESPONSES TO PHQ9 QUESTIONS 1 & 2: 0
SUM OF ALL RESPONSES TO PHQ QUESTIONS 1-9: 0
1. LITTLE INTEREST OR PLEASURE IN DOING THINGS: 0
2. FEELING DOWN, DEPRESSED OR HOPELESS: 0

## 2018-02-09 RX ORDER — OXYCODONE AND ACETAMINOPHEN 10; 325 MG/1; MG/1
1 TABLET ORAL EVERY 8 HOURS PRN
Qty: 90 TABLET | Refills: 0 | Status: SHIPPED | OUTPATIENT
Start: 2018-02-12 | End: 2018-02-16 | Stop reason: SDUPTHER

## 2018-02-13 RX ORDER — FLUCONAZOLE 150 MG/1
150 TABLET ORAL DAILY
Qty: 14 TABLET | Refills: 0 | Status: SHIPPED | OUTPATIENT
Start: 2018-02-13 | End: 2018-02-27

## 2018-02-15 ENCOUNTER — TELEPHONE (OUTPATIENT)
Dept: FAMILY MEDICINE CLINIC | Age: 48
End: 2018-02-15

## 2018-02-15 DIAGNOSIS — B37.0 THRUSH: Primary | ICD-10-CM

## 2018-02-15 RX ORDER — CLOTRIMAZOLE 10 MG/1
10 LOZENGE ORAL; TOPICAL
Qty: 50 TABLET | Refills: 0 | Status: SHIPPED | OUTPATIENT
Start: 2018-02-15 | End: 2018-02-25

## 2018-02-16 ENCOUNTER — HOSPITAL ENCOUNTER (OUTPATIENT)
Dept: PAIN MANAGEMENT | Age: 48
Discharge: HOME | End: 2018-02-16
Payer: COMMERCIAL

## 2018-02-16 VITALS
RESPIRATION RATE: 18 BRPM | DIASTOLIC BLOOD PRESSURE: 85 MMHG | HEART RATE: 102 BPM | WEIGHT: 190 LBS | TEMPERATURE: 98.2 F | BODY MASS INDEX: 26.6 KG/M2 | OXYGEN SATURATION: 99 % | HEIGHT: 71 IN | SYSTOLIC BLOOD PRESSURE: 126 MMHG

## 2018-02-16 DIAGNOSIS — M50.10 CERVICAL DISC DISORDER WITH RADICULOPATHY: ICD-10-CM

## 2018-02-16 PROCEDURE — 99213 OFFICE O/P EST LOW 20 MIN: CPT

## 2018-02-16 RX ORDER — OXYCODONE AND ACETAMINOPHEN 10; 325 MG/1; MG/1
1 TABLET ORAL EVERY 8 HOURS PRN
Qty: 90 TABLET | Refills: 0 | Status: SHIPPED | OUTPATIENT
Start: 2018-03-08 | End: 2018-04-07

## 2018-02-16 RX ORDER — OXYCODONE AND ACETAMINOPHEN 10; 325 MG/1; MG/1
1 TABLET ORAL EVERY 8 HOURS PRN
Qty: 90 TABLET | Refills: 0 | Status: SHIPPED | OUTPATIENT
Start: 2018-03-14 | End: 2018-02-16 | Stop reason: SDUPTHER

## 2018-02-16 ASSESSMENT — PAIN SCALES - GENERAL: PAINLEVEL_OUTOF10: 6

## 2018-02-16 ASSESSMENT — PAIN DESCRIPTION - PROGRESSION: CLINICAL_PROGRESSION: NOT CHANGED

## 2018-02-16 ASSESSMENT — PAIN DESCRIPTION - ONSET: ONSET: ON-GOING

## 2018-02-16 ASSESSMENT — PAIN DESCRIPTION - PAIN TYPE: TYPE: CHRONIC PAIN

## 2018-02-16 ASSESSMENT — PAIN DESCRIPTION - LOCATION: LOCATION: NECK

## 2018-02-16 ASSESSMENT — ACTIVITIES OF DAILY LIVING (ADL): EFFECT OF PAIN ON DAILY ACTIVITIES: DAILY CHORES AND ACTIVITIES

## 2018-02-16 ASSESSMENT — PAIN DESCRIPTION - FREQUENCY: FREQUENCY: CONTINUOUS

## 2018-02-26 ENCOUNTER — OFFICE VISIT (OUTPATIENT)
Dept: FAMILY MEDICINE CLINIC | Age: 48
End: 2018-02-26
Payer: COMMERCIAL

## 2018-02-26 VITALS
SYSTOLIC BLOOD PRESSURE: 124 MMHG | BODY MASS INDEX: 25.94 KG/M2 | RESPIRATION RATE: 16 BRPM | WEIGHT: 186 LBS | OXYGEN SATURATION: 98 % | TEMPERATURE: 98.7 F | DIASTOLIC BLOOD PRESSURE: 82 MMHG | HEART RATE: 93 BPM

## 2018-02-26 DIAGNOSIS — Z72.0 TOBACCO ABUSE: ICD-10-CM

## 2018-02-26 DIAGNOSIS — K14.8 TONGUE DISCOLORATION: Primary | ICD-10-CM

## 2018-02-26 PROCEDURE — 99213 OFFICE O/P EST LOW 20 MIN: CPT | Performed by: NURSE PRACTITIONER

## 2018-02-26 NOTE — PROGRESS NOTES
SUBJECTIVE:    Patient ID: Annie Larsen is a 56 y.o. male. Chief Complaint   Patient presents with   Daryl Friends     Patient is here today for a follow up. HPI:   HPI     Patient states that he is here today for follow-up on abnormal and appearance. Patient reports that he was seen in office visit on February 6, 2018. He had noticed some mouth irritation for approximately 2 weeks. Patient states that his tongue almost felt as though it was raw hurting to even eat or drink for approximately 3 days. At that time he does report that he had had no recent antibiotics and no recent oral steroids. Patient did receive spinal injections that included steroid. Patient reports that that date he was treated with Diflucan once a day for 14 days and he does not feel that he is seeing any improvement to his tongue or at least not a resolution of symptoms. Past Medical History:   Diagnosis Date    Arthritis     Chronic back pain     sciatica    COPD (chronic obstructive pulmonary disease) (HCC)     MVP (mitral valve prolapse)     Neck pain     HX vertebral fx with hardware     Current Outpatient Prescriptions on File Prior to Visit   Medication Sig Dispense Refill    [START ON 3/8/2018] oxyCODONE-acetaminophen (PERCOCET)  MG per tablet Take 1 tablet by mouth every 8 hours as needed for Pain for up to 30 days Early refill ok per   Must last until 04/13/18. Earliest Fill Date: 3/8/18 90 tablet 0    fluconazole (DIFLUCAN) 150 MG tablet Take 1 tablet by mouth daily for 14 doses 14 tablet 0    desipramine (NORPRAMIN) 25 MG tablet Take 1 tablet by mouth nightly 30 tablet 3    cetaphil (CETAPHIL) cream [place] pain cream 1,2,3,4,5  Apply a dime sized amount topically to neck and back as needed. 360 g 0    cyclobenzaprine (FLEXERIL) 10 MG tablet Take 10 mg by mouth 3 times daily as needed for Muscle spasms       No current facility-administered medications on file prior to visit. Allergies   Allergen Reactions    Pcn [Penicillins]      Past Surgical History:   Procedure Laterality Date    CERVICAL DISC ARTHROPLASTY N/A 3/1/2017    ACDF C5-6, C6-7 WITH ALLOGRAFT BONE AND ANTERIOR CERVICAL PLATING  performed by Taya Montelongo DO at [address]      TONSILLECTOMY AND ADENOIDECTOMY       Family History   Problem Relation Age of Onset    Cancer Mother      breast     Cancer Maternal Grandmother      breast     Cancer Father      colon     Social History     Social History    Marital status:      Spouse name: N/A    Number of children: N/A    Years of education: N/A     Occupational History    Not on file. Social History Main Topics    Smoking status: Former Smoker     Packs/day: 1.00     Years: 30.00     Types: Cigarettes     Quit date: 2017    Smokeless tobacco: Never Used    Alcohol use Yes      Comment: occasionally    Drug use: No    Sexual activity: Not Currently     Partners: Female     Other Topics Concern    Not on file     Social History Narrative    No narrative on file       Review of Systems    OBJECTIVE:    Physical Exam   Constitutional: He is oriented to person, place, and time. He appears well-developed and well-nourished. No distress. HENT:   Head: Normocephalic and atraumatic. Right Ear: External ear normal.   Left Ear: External ear normal.   Nose: Nose normal.   Mouth/Throat: Uvula is midline, oropharynx is clear and moist and mucous membranes are normal. No oropharyngeal exudate. Eyes: Conjunctivae and EOM are normal. Pupils are equal, round, and reactive to light. Neck: Normal range of motion. Neck supple. No tracheal deviation present. No thyromegaly present. Cardiovascular: Normal rate, regular rhythm and normal heart sounds. Pulmonary/Chest: Effort normal and breath sounds normal. No respiratory distress. Lymphadenopathy:     He has no cervical adenopathy.    Neurological: He is alert and

## 2018-02-28 ENCOUNTER — TELEPHONE (OUTPATIENT)
Dept: GASTROENTEROLOGY | Age: 48
End: 2018-02-28

## 2018-03-19 ENCOUNTER — OFFICE VISIT (OUTPATIENT)
Dept: FAMILY MEDICINE CLINIC | Age: 48
End: 2018-03-19
Payer: COMMERCIAL

## 2018-03-19 VITALS
OXYGEN SATURATION: 98 % | SYSTOLIC BLOOD PRESSURE: 108 MMHG | RESPIRATION RATE: 16 BRPM | DIASTOLIC BLOOD PRESSURE: 76 MMHG | TEMPERATURE: 98.1 F | BODY MASS INDEX: 25.1 KG/M2 | HEART RATE: 100 BPM | WEIGHT: 180 LBS

## 2018-03-19 DIAGNOSIS — J06.9 ACUTE URI: Primary | ICD-10-CM

## 2018-03-19 PROCEDURE — 99213 OFFICE O/P EST LOW 20 MIN: CPT | Performed by: NURSE PRACTITIONER

## 2018-03-19 PROCEDURE — 96372 THER/PROPH/DIAG INJ SC/IM: CPT | Performed by: NURSE PRACTITIONER

## 2018-03-19 RX ORDER — TRIAMCINOLONE ACETONIDE 40 MG/ML
40 INJECTION, SUSPENSION INTRA-ARTICULAR; INTRAMUSCULAR ONCE
Status: COMPLETED | OUTPATIENT
Start: 2018-03-19 | End: 2018-03-19

## 2018-03-19 RX ORDER — DOXYCYCLINE HYCLATE 100 MG
100 TABLET ORAL 2 TIMES DAILY
Qty: 20 TABLET | Refills: 0 | Status: SHIPPED | OUTPATIENT
Start: 2018-03-19 | End: 2018-03-29

## 2018-03-19 RX ORDER — DEXAMETHASONE SODIUM PHOSPHATE 4 MG/ML
4 INJECTION, SOLUTION INTRA-ARTICULAR; INTRALESIONAL; INTRAMUSCULAR; INTRAVENOUS; SOFT TISSUE ONCE
Status: COMPLETED | OUTPATIENT
Start: 2018-03-19 | End: 2018-03-19

## 2018-03-19 RX ADMIN — DEXAMETHASONE SODIUM PHOSPHATE 4 MG: 4 INJECTION, SOLUTION INTRA-ARTICULAR; INTRALESIONAL; INTRAMUSCULAR; INTRAVENOUS; SOFT TISSUE at 14:28

## 2018-03-19 RX ADMIN — TRIAMCINOLONE ACETONIDE 40 MG: 40 INJECTION, SUSPENSION INTRA-ARTICULAR; INTRAMUSCULAR at 14:29

## 2018-03-19 ASSESSMENT — ENCOUNTER SYMPTOMS
SORE THROAT: 1
SINUS PRESSURE: 0
COUGH: 1
SHORTNESS OF BREATH: 0
SINUS COMPLAINT: 1
SWOLLEN GLANDS: 0

## 2018-03-19 ASSESSMENT — PATIENT HEALTH QUESTIONNAIRE - PHQ9
1. LITTLE INTEREST OR PLEASURE IN DOING THINGS: 0
SUM OF ALL RESPONSES TO PHQ9 QUESTIONS 1 & 2: 0
SUM OF ALL RESPONSES TO PHQ QUESTIONS 1-9: 0
2. FEELING DOWN, DEPRESSED OR HOPELESS: 0

## 2018-03-19 NOTE — PROGRESS NOTES
Pulse 100   Temp 98.1 °F (36.7 °C) (Oral)   Resp 16   Wt 180 lb (81.6 kg)   SpO2 98%   BMI 25.10 kg/m²     Assessment:      1. Acute URI  doxycycline hyclate (VIBRA-TABS) 100 MG tablet    triamcinolone acetonide (KENALOG-40) injection 40 mg    dexamethasone (DECADRON) injection 4 mg       Plan:      Orders Placed This Encounter   Medications    doxycycline hyclate (VIBRA-TABS) 100 MG tablet     Sig: Take 1 tablet by mouth 2 times daily for 10 days     Dispense:  20 tablet     Refill:  0    triamcinolone acetonide (KENALOG-40) injection 40 mg    dexamethasone (DECADRON) injection 4 mg    Increase fluids and rest.  Tylenol or ibuprofen as needed. Follow up if no better in 72 hours. Vitamin C and zinc supplement as directed. Throat/cough lozenges as needed. Do not take abx unless symptoms persist and worsen over seven to ten days. Patient given educational materials - see patient instructions. Discussed use, benefit, and side effects of prescribed medications. All patient questions answered. Pt voiced understanding. Reviewed health maintenance. Instructed to continue current medications, diet and exercise. Patient agreed with treatment plan. Follow up as directed.          Electronically signed by NOEMI Christianson on 3/19/2018 at 3:54 PM

## 2018-03-26 ENCOUNTER — OFFICE VISIT (OUTPATIENT)
Dept: OTOLARYNGOLOGY | Age: 48
End: 2018-03-26
Payer: COMMERCIAL

## 2018-03-26 VITALS
OXYGEN SATURATION: 99 % | HEIGHT: 70 IN | WEIGHT: 172 LBS | TEMPERATURE: 98 F | BODY MASS INDEX: 24.62 KG/M2 | RESPIRATION RATE: 20 BRPM | SYSTOLIC BLOOD PRESSURE: 114 MMHG | HEART RATE: 98 BPM | DIASTOLIC BLOOD PRESSURE: 76 MMHG

## 2018-03-26 DIAGNOSIS — Z87.19: Primary | ICD-10-CM

## 2018-03-26 PROCEDURE — 99203 OFFICE O/P NEW LOW 30 MIN: CPT | Performed by: OTOLARYNGOLOGY

## 2018-04-10 ENCOUNTER — HOSPITAL ENCOUNTER (OUTPATIENT)
Age: 48
Setting detail: OUTPATIENT SURGERY
Discharge: HOME | End: 2018-04-10
Attending: INTERNAL MEDICINE | Admitting: INTERNAL MEDICINE
Payer: COMMERCIAL

## 2018-04-10 ENCOUNTER — HOSPITAL ENCOUNTER (OUTPATIENT)
Age: 48
Setting detail: SPECIMEN
Discharge: HOME | End: 2018-04-10
Payer: COMMERCIAL

## 2018-04-10 ENCOUNTER — ANESTHESIA (OUTPATIENT)
Dept: OPERATING ROOM | Age: 48
End: 2018-04-10

## 2018-04-10 ENCOUNTER — ANESTHESIA EVENT (OUTPATIENT)
Dept: OPERATING ROOM | Age: 48
End: 2018-04-10

## 2018-04-10 VITALS — SYSTOLIC BLOOD PRESSURE: 111 MMHG | DIASTOLIC BLOOD PRESSURE: 70 MMHG | OXYGEN SATURATION: 96 %

## 2018-04-10 VITALS
DIASTOLIC BLOOD PRESSURE: 48 MMHG | HEIGHT: 71 IN | RESPIRATION RATE: 18 BRPM | OXYGEN SATURATION: 97 % | TEMPERATURE: 97.8 F | WEIGHT: 180 LBS | BODY MASS INDEX: 25.2 KG/M2 | HEART RATE: 80 BPM | SYSTOLIC BLOOD PRESSURE: 114 MMHG

## 2018-04-10 PROCEDURE — G8918 PT W/O PREOP ORDER IV AB PRO: HCPCS

## 2018-04-10 PROCEDURE — G8907 PT DOC NO EVENTS ON DISCHARG: HCPCS

## 2018-04-10 PROCEDURE — 88305 TISSUE EXAM BY PATHOLOGIST: CPT

## 2018-04-10 PROCEDURE — 45380 COLONOSCOPY AND BIOPSY: CPT

## 2018-04-10 PROCEDURE — 45380 COLONOSCOPY AND BIOPSY: CPT | Performed by: INTERNAL MEDICINE

## 2018-04-10 RX ORDER — SODIUM CHLORIDE 9 MG/ML
INJECTION, SOLUTION INTRAVENOUS CONTINUOUS
Status: DISCONTINUED | OUTPATIENT
Start: 2018-04-10 | End: 2018-04-10 | Stop reason: HOSPADM

## 2018-04-10 RX ORDER — SODIUM CHLORIDE, SODIUM LACTATE, POTASSIUM CHLORIDE, CALCIUM CHLORIDE 600; 310; 30; 20 MG/100ML; MG/100ML; MG/100ML; MG/100ML
INJECTION, SOLUTION INTRAVENOUS CONTINUOUS PRN
Status: DISCONTINUED | OUTPATIENT
Start: 2018-04-10 | End: 2018-04-10 | Stop reason: SDUPTHER

## 2018-04-10 RX ORDER — MIDAZOLAM HYDROCHLORIDE 1 MG/ML
INJECTION INTRAMUSCULAR; INTRAVENOUS PRN
Status: DISCONTINUED | OUTPATIENT
Start: 2018-04-10 | End: 2018-04-10 | Stop reason: SDUPTHER

## 2018-04-10 RX ORDER — LIDOCAINE HYDROCHLORIDE 10 MG/ML
INJECTION, SOLUTION INFILTRATION; PERINEURAL PRN
Status: DISCONTINUED | OUTPATIENT
Start: 2018-04-10 | End: 2018-04-10 | Stop reason: SDUPTHER

## 2018-04-10 RX ORDER — OXYCODONE AND ACETAMINOPHEN 10; 325 MG/1; MG/1
1 TABLET ORAL EVERY 4 HOURS PRN
COMMUNITY
End: 2018-04-11 | Stop reason: SDUPTHER

## 2018-04-10 RX ORDER — PROPOFOL 10 MG/ML
INJECTION, EMULSION INTRAVENOUS PRN
Status: DISCONTINUED | OUTPATIENT
Start: 2018-04-10 | End: 2018-04-10 | Stop reason: SDUPTHER

## 2018-04-10 RX ADMIN — MIDAZOLAM HYDROCHLORIDE 2 MG: 1 INJECTION INTRAMUSCULAR; INTRAVENOUS at 12:55

## 2018-04-10 RX ADMIN — LIDOCAINE HYDROCHLORIDE 40 MG: 10 INJECTION, SOLUTION INFILTRATION; PERINEURAL at 12:55

## 2018-04-10 RX ADMIN — SODIUM CHLORIDE: 9 INJECTION, SOLUTION INTRAVENOUS at 12:29

## 2018-04-10 RX ADMIN — SODIUM CHLORIDE, SODIUM LACTATE, POTASSIUM CHLORIDE, CALCIUM CHLORIDE: 600; 310; 30; 20 INJECTION, SOLUTION INTRAVENOUS at 12:52

## 2018-04-10 RX ADMIN — PROPOFOL 230 MG: 10 INJECTION, EMULSION INTRAVENOUS at 12:56

## 2018-04-10 ASSESSMENT — LIFESTYLE VARIABLES: SMOKING_STATUS: 1

## 2018-04-12 RX ORDER — OXYCODONE AND ACETAMINOPHEN 10; 325 MG/1; MG/1
1 TABLET ORAL EVERY 8 HOURS PRN
Qty: 90 TABLET | Refills: 0 | Status: SHIPPED | OUTPATIENT
Start: 2018-04-13 | End: 2018-04-25 | Stop reason: SDUPTHER

## 2018-04-25 ENCOUNTER — HOSPITAL ENCOUNTER (OUTPATIENT)
Dept: PAIN MANAGEMENT | Age: 48
Discharge: HOME | End: 2018-04-25
Payer: COMMERCIAL

## 2018-04-25 VITALS
OXYGEN SATURATION: 100 % | RESPIRATION RATE: 20 BRPM | HEIGHT: 71 IN | WEIGHT: 177 LBS | HEART RATE: 83 BPM | BODY MASS INDEX: 24.78 KG/M2 | SYSTOLIC BLOOD PRESSURE: 123 MMHG | DIASTOLIC BLOOD PRESSURE: 79 MMHG | TEMPERATURE: 98.4 F

## 2018-04-25 DIAGNOSIS — M50.10 CERVICAL DISC DISORDER WITH RADICULOPATHY: ICD-10-CM

## 2018-04-25 DIAGNOSIS — M47.816 LUMBAR FACET ARTHROPATHY: Chronic | ICD-10-CM

## 2018-04-25 DIAGNOSIS — M51.36 LUMBAR DEGENERATIVE DISC DISEASE: ICD-10-CM

## 2018-04-25 PROCEDURE — 6360000002 HC RX W HCPCS

## 2018-04-25 PROCEDURE — 64493 INJ PARAVERT F JNT L/S 1 LEV: CPT

## 2018-04-25 PROCEDURE — 2500000003 HC RX 250 WO HCPCS

## 2018-04-25 PROCEDURE — 64494 INJ PARAVERT F JNT L/S 2 LEV: CPT

## 2018-04-25 PROCEDURE — 3209999900 FLUORO FOR SURGICAL PROCEDURES

## 2018-04-25 RX ORDER — TRIAMCINOLONE ACETONIDE 40 MG/ML
INJECTION, SUSPENSION INTRA-ARTICULAR; INTRAMUSCULAR
Status: COMPLETED | OUTPATIENT
Start: 2018-04-25 | End: 2018-04-25

## 2018-04-25 RX ORDER — OXYCODONE AND ACETAMINOPHEN 10; 325 MG/1; MG/1
1 TABLET ORAL EVERY 8 HOURS PRN
Qty: 90 TABLET | Refills: 0 | Status: SHIPPED | OUTPATIENT
Start: 2018-05-13 | End: 2018-06-11 | Stop reason: SDUPTHER

## 2018-04-25 RX ORDER — BUPIVACAINE HYDROCHLORIDE 5 MG/ML
INJECTION, SOLUTION EPIDURAL; INTRACAUDAL
Status: COMPLETED | OUTPATIENT
Start: 2018-04-25 | End: 2018-04-25

## 2018-04-25 RX ADMIN — BUPIVACAINE HYDROCHLORIDE 9 ML: 5 INJECTION, SOLUTION EPIDURAL; INTRACAUDAL at 14:22

## 2018-04-25 RX ADMIN — TRIAMCINOLONE ACETONIDE 40 MG: 40 INJECTION, SUSPENSION INTRA-ARTICULAR; INTRAMUSCULAR at 14:22

## 2018-04-25 ASSESSMENT — PAIN - FUNCTIONAL ASSESSMENT: PAIN_FUNCTIONAL_ASSESSMENT: 0-10

## 2018-05-11 ENCOUNTER — OFFICE VISIT (OUTPATIENT)
Dept: FAMILY MEDICINE CLINIC | Age: 48
End: 2018-05-11
Payer: COMMERCIAL

## 2018-05-11 VITALS
OXYGEN SATURATION: 97 % | HEART RATE: 89 BPM | DIASTOLIC BLOOD PRESSURE: 84 MMHG | BODY MASS INDEX: 25.66 KG/M2 | SYSTOLIC BLOOD PRESSURE: 122 MMHG | TEMPERATURE: 97.8 F | WEIGHT: 184 LBS | RESPIRATION RATE: 16 BRPM

## 2018-05-11 DIAGNOSIS — J06.9 ACUTE URI: ICD-10-CM

## 2018-05-11 DIAGNOSIS — D36.7 DERMOID CYST OF LEG, LEFT: Primary | ICD-10-CM

## 2018-05-11 PROCEDURE — 99213 OFFICE O/P EST LOW 20 MIN: CPT | Performed by: NURSE PRACTITIONER

## 2018-05-11 RX ORDER — BENZONATATE 200 MG/1
200 CAPSULE ORAL 3 TIMES DAILY PRN
Qty: 21 CAPSULE | Refills: 0 | Status: SHIPPED | OUTPATIENT
Start: 2018-05-11 | End: 2018-05-18

## 2018-05-11 RX ORDER — ALBUTEROL SULFATE 90 UG/1
2 AEROSOL, METERED RESPIRATORY (INHALATION) EVERY 6 HOURS PRN
Qty: 1 INHALER | Refills: 0 | Status: SHIPPED | OUTPATIENT
Start: 2018-05-11 | End: 2018-06-12 | Stop reason: ALTCHOICE

## 2018-05-11 RX ORDER — DOXYCYCLINE HYCLATE 100 MG
100 TABLET ORAL 2 TIMES DAILY
Qty: 20 TABLET | Refills: 0 | Status: SHIPPED | OUTPATIENT
Start: 2018-05-11 | End: 2018-05-21 | Stop reason: ALTCHOICE

## 2018-05-11 ASSESSMENT — ENCOUNTER SYMPTOMS
ROS SKIN COMMENTS: CYST
COUGH: 1
SINUS PRESSURE: 0
SORE THROAT: 1

## 2018-05-21 ENCOUNTER — OFFICE VISIT (OUTPATIENT)
Dept: SURGERY | Age: 48
End: 2018-05-21
Payer: COMMERCIAL

## 2018-05-21 VITALS
HEIGHT: 70 IN | SYSTOLIC BLOOD PRESSURE: 102 MMHG | BODY MASS INDEX: 25.62 KG/M2 | TEMPERATURE: 98.3 F | WEIGHT: 179 LBS | DIASTOLIC BLOOD PRESSURE: 68 MMHG

## 2018-05-21 DIAGNOSIS — R19.09 GROIN MASS: Primary | ICD-10-CM

## 2018-05-21 PROCEDURE — 99202 OFFICE O/P NEW SF 15 MIN: CPT | Performed by: PHYSICIAN ASSISTANT

## 2018-06-11 DIAGNOSIS — M51.36 DDD (DEGENERATIVE DISC DISEASE), LUMBAR: Primary | ICD-10-CM

## 2018-06-11 RX ORDER — OXYCODONE AND ACETAMINOPHEN 10; 325 MG/1; MG/1
1 TABLET ORAL EVERY 8 HOURS PRN
Qty: 90 TABLET | Refills: 0 | Status: SHIPPED | OUTPATIENT
Start: 2018-06-12 | End: 2018-07-10 | Stop reason: SDUPTHER

## 2018-06-12 ENCOUNTER — HOSPITAL ENCOUNTER (OUTPATIENT)
Dept: PAIN MANAGEMENT | Age: 48
Discharge: HOME | End: 2018-06-12
Payer: COMMERCIAL

## 2018-06-12 VITALS
DIASTOLIC BLOOD PRESSURE: 76 MMHG | HEART RATE: 90 BPM | SYSTOLIC BLOOD PRESSURE: 126 MMHG | OXYGEN SATURATION: 97 % | BODY MASS INDEX: 25.62 KG/M2 | RESPIRATION RATE: 18 BRPM | TEMPERATURE: 97.4 F | WEIGHT: 179 LBS | HEIGHT: 70 IN

## 2018-06-12 DIAGNOSIS — M46.1 SACROILIITIS (HCC): ICD-10-CM

## 2018-06-12 DIAGNOSIS — M79.18 MYOFACIAL MUSCLE PAIN: ICD-10-CM

## 2018-06-12 DIAGNOSIS — M50.10 CERVICAL DISC DISORDER WITH RADICULOPATHY: ICD-10-CM

## 2018-06-12 PROCEDURE — 99213 OFFICE O/P EST LOW 20 MIN: CPT

## 2018-06-12 PROCEDURE — 99214 OFFICE O/P EST MOD 30 MIN: CPT | Performed by: NURSE PRACTITIONER

## 2018-06-12 RX ORDER — CYCLOBENZAPRINE HCL 10 MG
10 TABLET ORAL 3 TIMES DAILY PRN
Qty: 90 TABLET | Refills: 0 | Status: SHIPPED | OUTPATIENT
Start: 2018-06-12 | End: 2018-07-12

## 2018-06-12 RX ORDER — IBUPROFEN AND FAMOTIDINE 26.6; 8 MG/1; MG/1
1 TABLET, FILM COATED ORAL 3 TIMES DAILY PRN
Qty: 90 TABLET | Refills: 0 | Status: SHIPPED | OUTPATIENT
Start: 2018-06-12 | End: 2018-11-29

## 2018-06-12 ASSESSMENT — PAIN DESCRIPTION - FREQUENCY: FREQUENCY: CONTINUOUS

## 2018-06-12 ASSESSMENT — PAIN DESCRIPTION - PROGRESSION: CLINICAL_PROGRESSION: NOT CHANGED

## 2018-06-12 ASSESSMENT — ENCOUNTER SYMPTOMS
CONSTIPATION: 0
BACK PAIN: 1

## 2018-06-12 ASSESSMENT — PAIN DESCRIPTION - ORIENTATION: ORIENTATION: LEFT;LOWER;UPPER

## 2018-06-12 ASSESSMENT — PAIN DESCRIPTION - ONSET: ONSET: ON-GOING

## 2018-06-12 ASSESSMENT — PAIN SCALES - GENERAL: PAINLEVEL_OUTOF10: 4

## 2018-06-12 ASSESSMENT — PAIN DESCRIPTION - DESCRIPTORS: DESCRIPTORS: ACHING;CONSTANT;RADIATING;THROBBING

## 2018-06-12 ASSESSMENT — ACTIVITIES OF DAILY LIVING (ADL): EFFECT OF PAIN ON DAILY ACTIVITIES: LIMITS ACTIVITIES

## 2018-06-12 ASSESSMENT — PAIN DESCRIPTION - LOCATION: LOCATION: BACK;NECK;SHOULDER

## 2018-06-12 ASSESSMENT — PAIN DESCRIPTION - PAIN TYPE: TYPE: CHRONIC PAIN

## 2018-06-15 ENCOUNTER — OFFICE VISIT (OUTPATIENT)
Dept: FAMILY MEDICINE CLINIC | Age: 48
End: 2018-06-15
Payer: COMMERCIAL

## 2018-06-15 VITALS
HEIGHT: 71 IN | WEIGHT: 181 LBS | BODY MASS INDEX: 25.34 KG/M2 | TEMPERATURE: 101.3 F | SYSTOLIC BLOOD PRESSURE: 120 MMHG | HEART RATE: 101 BPM | DIASTOLIC BLOOD PRESSURE: 66 MMHG | OXYGEN SATURATION: 98 %

## 2018-06-15 DIAGNOSIS — J01.11 ACUTE RECURRENT FRONTAL SINUSITIS: Primary | ICD-10-CM

## 2018-06-15 PROCEDURE — 99212 OFFICE O/P EST SF 10 MIN: CPT | Performed by: INTERNAL MEDICINE

## 2018-06-15 RX ORDER — DOXYCYCLINE HYCLATE 100 MG
100 TABLET ORAL 2 TIMES DAILY
Qty: 20 TABLET | Refills: 0 | Status: SHIPPED | OUTPATIENT
Start: 2018-06-15 | End: 2018-06-25

## 2018-06-15 ASSESSMENT — ENCOUNTER SYMPTOMS
SINUS PRESSURE: 1
COUGH: 1
RHINORRHEA: 1
SINUS PAIN: 1
FACIAL SWELLING: 0

## 2018-07-10 DIAGNOSIS — M51.36 DDD (DEGENERATIVE DISC DISEASE), LUMBAR: ICD-10-CM

## 2018-07-10 RX ORDER — OXYCODONE AND ACETAMINOPHEN 10; 325 MG/1; MG/1
1 TABLET ORAL EVERY 8 HOURS PRN
Qty: 90 TABLET | Refills: 0 | Status: SHIPPED | OUTPATIENT
Start: 2018-07-12 | End: 2018-08-07 | Stop reason: SDUPTHER

## 2018-07-19 ENCOUNTER — HOSPITAL ENCOUNTER (OUTPATIENT)
Dept: PAIN MANAGEMENT | Age: 48
Discharge: HOME | End: 2018-07-19
Payer: COMMERCIAL

## 2018-07-19 VITALS
TEMPERATURE: 98.6 F | BODY MASS INDEX: 26.2 KG/M2 | HEART RATE: 76 BPM | DIASTOLIC BLOOD PRESSURE: 82 MMHG | SYSTOLIC BLOOD PRESSURE: 126 MMHG | WEIGHT: 183 LBS | RESPIRATION RATE: 20 BRPM | OXYGEN SATURATION: 96 % | HEIGHT: 70 IN

## 2018-07-19 DIAGNOSIS — M50.10 CERVICAL DISC DISORDER WITH RADICULOPATHY: ICD-10-CM

## 2018-07-19 PROCEDURE — 2500000003 HC RX 250 WO HCPCS

## 2018-07-19 PROCEDURE — 20553 NJX 1/MLT TRIGGER POINTS 3/>: CPT

## 2018-07-19 RX ORDER — LIDOCAINE HYDROCHLORIDE 10 MG/ML
INJECTION, SOLUTION EPIDURAL; INFILTRATION; INTRACAUDAL; PERINEURAL
Status: COMPLETED | OUTPATIENT
Start: 2018-07-19 | End: 2018-07-19

## 2018-07-19 RX ADMIN — LIDOCAINE HYDROCHLORIDE 14 ML: 10 INJECTION, SOLUTION EPIDURAL; INFILTRATION; INTRACAUDAL; PERINEURAL at 11:10

## 2018-07-19 ASSESSMENT — ACTIVITIES OF DAILY LIVING (ADL): EFFECT OF PAIN ON DAILY ACTIVITIES: DAILY CHORES AND ACTIVITIES

## 2018-07-19 ASSESSMENT — PAIN DESCRIPTION - DESCRIPTORS: DESCRIPTORS: ACHING;CONSTANT;RADIATING;THROBBING;NUMBNESS;TINGLING

## 2018-07-19 ASSESSMENT — PAIN - FUNCTIONAL ASSESSMENT: PAIN_FUNCTIONAL_ASSESSMENT: 0-10

## 2018-07-19 NOTE — PROCEDURES
given to discussion, evaluation and counseling.                              Yamile Dia MD  7/19/2018

## 2018-08-07 DIAGNOSIS — M51.36 DDD (DEGENERATIVE DISC DISEASE), LUMBAR: ICD-10-CM

## 2018-08-07 RX ORDER — OXYCODONE AND ACETAMINOPHEN 10; 325 MG/1; MG/1
1 TABLET ORAL EVERY 8 HOURS PRN
Qty: 90 TABLET | Refills: 0 | Status: SHIPPED | OUTPATIENT
Start: 2018-08-11 | End: 2018-09-05 | Stop reason: SDUPTHER

## 2018-09-05 DIAGNOSIS — M51.36 DDD (DEGENERATIVE DISC DISEASE), LUMBAR: ICD-10-CM

## 2018-09-05 RX ORDER — OXYCODONE AND ACETAMINOPHEN 10; 325 MG/1; MG/1
1 TABLET ORAL EVERY 8 HOURS PRN
Qty: 90 TABLET | Refills: 0 | Status: SHIPPED | OUTPATIENT
Start: 2018-09-10 | End: 2018-10-01 | Stop reason: SDUPTHER

## 2018-09-12 ENCOUNTER — HOSPITAL ENCOUNTER (OUTPATIENT)
Dept: PAIN MANAGEMENT | Age: 48
Discharge: HOME | End: 2018-09-12
Payer: COMMERCIAL

## 2018-09-12 VITALS
TEMPERATURE: 97.4 F | SYSTOLIC BLOOD PRESSURE: 125 MMHG | HEART RATE: 104 BPM | DIASTOLIC BLOOD PRESSURE: 83 MMHG | HEIGHT: 70 IN | RESPIRATION RATE: 18 BRPM | BODY MASS INDEX: 26.2 KG/M2 | WEIGHT: 183 LBS | OXYGEN SATURATION: 97 %

## 2018-09-12 DIAGNOSIS — M54.5 ACUTE MIDLINE LOW BACK PAIN, WITH SCIATICA PRESENCE UNSPECIFIED: ICD-10-CM

## 2018-09-12 DIAGNOSIS — M50.10 CERVICAL DISC DISORDER WITH RADICULOPATHY: ICD-10-CM

## 2018-09-12 DIAGNOSIS — M54.16 LUMBAR RADICULOPATHY: ICD-10-CM

## 2018-09-12 DIAGNOSIS — M79.18 MYOFACIAL MUSCLE PAIN: Primary | ICD-10-CM

## 2018-09-12 PROCEDURE — 99214 OFFICE O/P EST MOD 30 MIN: CPT | Performed by: NURSE PRACTITIONER

## 2018-09-12 PROCEDURE — 99215 OFFICE O/P EST HI 40 MIN: CPT

## 2018-09-12 RX ORDER — METHOCARBAMOL 750 MG/1
TABLET, FILM COATED ORAL
Qty: 60 TABLET | Refills: 0 | Status: SHIPPED | OUTPATIENT
Start: 2018-09-12 | End: 2018-11-12 | Stop reason: ALTCHOICE

## 2018-09-12 RX ORDER — GABAPENTIN 100 MG/1
100 CAPSULE ORAL 3 TIMES DAILY
Qty: 90 CAPSULE | Refills: 0 | Status: SHIPPED | OUTPATIENT
Start: 2018-09-12 | End: 2019-01-29

## 2018-09-12 RX ORDER — AMITRIPTYLINE HYDROCHLORIDE 25 MG/1
TABLET, FILM COATED ORAL
Qty: 75 TABLET | Refills: 0 | Status: SHIPPED | OUTPATIENT
Start: 2018-09-12 | End: 2018-11-12 | Stop reason: SINTOL

## 2018-09-12 ASSESSMENT — PAIN DESCRIPTION - PROGRESSION: CLINICAL_PROGRESSION: NOT CHANGED

## 2018-09-12 ASSESSMENT — PAIN DESCRIPTION - LOCATION: LOCATION: NECK

## 2018-09-12 ASSESSMENT — PAIN DESCRIPTION - PAIN TYPE: TYPE: CHRONIC PAIN

## 2018-09-12 ASSESSMENT — PAIN SCALES - GENERAL: PAINLEVEL_OUTOF10: 6

## 2018-09-12 ASSESSMENT — PAIN DESCRIPTION - ONSET: ONSET: ON-GOING

## 2018-09-12 ASSESSMENT — ACTIVITIES OF DAILY LIVING (ADL): EFFECT OF PAIN ON DAILY ACTIVITIES: LIMITS ACTIVITIES

## 2018-09-12 ASSESSMENT — PAIN DESCRIPTION - ORIENTATION: ORIENTATION: UPPER

## 2018-09-12 ASSESSMENT — PAIN DESCRIPTION - FREQUENCY: FREQUENCY: CONTINUOUS

## 2018-09-12 NOTE — PROGRESS NOTES
Nursing Admission Record    Current Issues / Falls / ER Visits:  No    Percentage of Pain Relief after Last Procedure:  0 %    How long lasted:  0 days    Radiology exams received during the last 12 months: Yes Shoulder xrays       When  Dec 2017                                              Where [facility]       Imaging on chart: Yes         Imaging records requested: No  MRI exams received in the past 2 years:  Yes Lumbar MRI in Sept 2017 at [facility]  Physical therapy during the last 6 months: Yes       When: Finished in Jan 2018                                             Where [facility] outpatient  Labs during the last 12 months: Yes    Education Provided:  [x] Review of Serge Eddie  [x] Agreement Review  [] Compliance Issues Discussed    [] Cognitive Behavior Needs [x] Exercise [] Review of Test [] Financial Issues  [x] Tobacco/Alcohol Use [x] Teaching [] New Patient [] Picture Obtained    Physician Plan:  [] Outgoing Referral  [] Pharmacy Consult  [] Test Ordered   [] Obtained Test Results / Consult Notes  [] UDS due at next visit, verified per EPIC      [] Suspected Physical Abuse or Suicide Risk assessed - IF YES COMPLETE QUESTIONS BELOW    If any of the following questions are answered yes - contact attending physician for referral:    Has been considering harming self to escape stress, pain problems? [] YES  [] NO  Has a suicide plan? [] YES  [] NO  Has attempted suicide in the past?   [] YES  [] NO  Has a close friend or family member who committed suicide? [] YES  [] NO    Patient Referred To : Additional Notes:                                                                         PEG Score    1. What number best describes you pain on average in the past week?         0           1          2         3         4        5        6         7       8         9     10   No Pain                                                                                               Pain as bad as you can imagine Score___7________    2. What number best describes how, during the past week, pain has interfered with you enjoyment of life? 0           1           2         3          4       5        6        7        8         9      10   Does not                                                                                              Completely     interfere                                                                                                 interferes            Score____7_______    3. What number best describes how, during the past week, pain has interfered with you general activity?        0     1               2           3          4         5        6        7       8         9       10   Does not                                                                                               Completely    interfere                                                                                                  interferes           Score_____7______                                                                                Total Score ÷ 3 = Score                                                                                                 Score___7_____                                                                                                  Assessment Completed by:  Electronically signed by Nancy Barnard RN on 9/12/2018 at 1:23 PM

## 2018-09-12 NOTE — PROGRESS NOTES
occasionally       Allergies  Pcn [penicillins]     Current Medications  Current Outpatient Prescriptions   Medication Sig Dispense Refill    oxyCODONE-acetaminophen (PERCOCET)  MG per tablet Take 1 tablet by mouth every 8 hours as needed for Pain for up to 30 days. (May fill 9/10/18). 90 tablet 0    ibuprofen-famotidine 800-26.6 MG TABS Take 1 tablet by mouth 3 times daily as needed (pain) 90 tablet 0    cetaphil (CETAPHIL) cream [place] pain cream 1,2,3,4,5  Apply a dime sized amount topically to neck and back as needed. 360 g 0     No current facility-administered medications for this encounter. Review of Systems:  Review of Systems   Gastrointestinal: Negative for bowel incontinence and constipation. Genitourinary: Negative for bladder incontinence. Musculoskeletal: Positive for back pain, joint pain, myalgias and neck pain. Neurological: Positive for tingling, numbness, headaches and paresthesias. Negative for sensory change. 14 point ROS negative besides that noted in HPI    Physical Exam:    Vitals:    09/12/18 1322   BP: 125/83   Pulse: 104   Resp: 18   Temp: 97.4 °F (36.3 °C)   TempSrc: Oral   SpO2: 97%   Weight: 183 lb (83 kg)   Height: 5' 10\" (1.778 m)       Body mass index is 26.26 kg/m². Physical Exam   Constitutional: He is oriented to person, place, and time. He appears well-developed and well-nourished. No distress. HENT:   Head: Normocephalic. Right Ear: External ear normal.   Left Ear: External ear normal.   Nose: Nose normal.   Eyes: Pupils are equal, round, and reactive to light. Conjunctivae and EOM are normal.   Neck: Normal range of motion. Neck supple. Cardiovascular: Normal rate and regular rhythm. Pulmonary/Chest: Effort normal and breath sounds normal.   Abdominal: Soft. Bowel sounds are normal. There is no hepatosplenomegaly. Musculoskeletal:        Cervical back: He exhibits decreased range of motion, tenderness, pain and spasm.         Lumbar back: He exhibits tenderness, pain and spasm. Back:    SLR (+) for pain across lower back but not below the knee more likely SI/Hip/hamstrings. Pain goes down lateral leg to bottom of his foot to the last few toes. Neurological: He is alert and oriented to person, place, and time. He has normal strength and normal reflexes. No sensory deficit. Skin: Skin is warm and dry. Psychiatric: He has a normal mood and affect. His behavior is normal. Judgment and thought content normal.   Nursing note and vitals reviewed. Recent Imaging:  XR Shoulder  Impression  1. Mild arthrosis of the AC joint. Otherwise normal exam of the left shoulder. Signed by Dr Riaz Estrada on 12/12/2017 4:28 PM     MRI of Lumbar Spine  FINDINGS:  At L5-S1, there is minimal disc bulging and mild facet arthropathy. There is no significant spinal or foraminal stenosis. At L4-5, there is mild disc bulging. There is a small left foraminal disc herniation extending into the inferior recess. There is mild facet arthropathy. There is no significant central spinal canal  narrowing. There is mild inferior recess foraminal narrowing bilaterally left greater than right. At L3-4, the disc is well-maintained. There is no disc herniation or significant disc bulging. There is no spinal or foraminal stenosis. At L2-3, there is disc narrowing and mild to moderate disc bulging producing dural sac compression. There is mild shortening of the pedicles. There is mild central spinal canal narrowing. There is no significant foraminal stenosis. At L1-2, there is mild disc narrowing and mild disc bulging. This produces minimal dural sac compression. This causes minimal central spinal canal narrowing. There is no significant foraminal stenosis. At T12-L1, there is mild disc bulging producing dural sac compression. There is no cord compression. There is no significant spinal or foraminal stenosis.   The inferior tip of the conus is at the L1 level. The visualized lower thoracic spinal cord demonstrates no signal abnormality. Impression  Degenerative changes at multiple levels. Please see the above report. Signed by Dr Jovanna Perez on 9/22/2017 5:32 PM    Assessment:  Active Problems:    Chronic back pain    Neck pain    Osteoarthritis of spine with radiculopathy, cervical region    Cervical disc disorder with radiculopathy    Lumbar facet arthropathy (HCC)    Sacroiliitis (HCC)    Myofascial pain  Resolved Problems:    * No resolved hospital problems. *      Plan:  1. MRI of Cervical spine and Lumbar spine for worsening symptoms  2. Start Elavil at hs for sleep. Patient is to start with one pills at night if that does not work then take two pills if that does not work go to three pills. Patient instructed that no refills were given and to call once he determines how many pills he will need at HS. 3. Start gabapentin 100 mg TID for neuropathic pain  4. Order Nerve Conduction Study and EMG due to worsening radicular pain. 5. Diclofenac patches for acute flare of low back pain. Samples and co pay card given  6. Start Robaxin 750 mg at Breakfast and afternoon. Continue Flexeril at hs does cause sedation so he can't take TID  7. Follow up to discuss results of test and response to treatment    [x] Will return to the office in   [] 1 month   [x] 4 - 6 weeks   [x] 2 months   [] 3 months for:  [] Planned Procedure   [] Procedure Follow-up   [x] Office Visit  [x] Medication Changes    Discussion: Discussed exam findings and plan of care with patient. Patient agreed with POC and questions were asked and answered. Activity: discussed exercise as beneficial to pain reduction, encouraged stretching exercise with a focus on torso strengthening.     Education Provided:  [x] Review of Jaya Gaines [x] Agreement Review [] ORT Score  [] Review of Test  [x] Compliance Issues Discussed [] Cognitive Behavior Needs [x] Exercise  [] Financial Issues [x]

## 2018-09-13 ASSESSMENT — ENCOUNTER SYMPTOMS
BACK PAIN: 1
BOWEL INCONTINENCE: 0
CONSTIPATION: 0

## 2018-09-19 ENCOUNTER — TELEPHONE (OUTPATIENT)
Dept: PAIN MANAGEMENT | Age: 48
End: 2018-09-19

## 2018-10-01 DIAGNOSIS — M51.36 DDD (DEGENERATIVE DISC DISEASE), LUMBAR: ICD-10-CM

## 2018-10-01 RX ORDER — OXYCODONE AND ACETAMINOPHEN 10; 325 MG/1; MG/1
1 TABLET ORAL EVERY 8 HOURS PRN
Qty: 90 TABLET | Refills: 0 | Status: SHIPPED | OUTPATIENT
Start: 2018-10-10 | End: 2018-11-05 | Stop reason: SDUPTHER

## 2018-10-04 ENCOUNTER — HOSPITAL ENCOUNTER (OUTPATIENT)
Dept: MRI IMAGING | Age: 48
Discharge: HOME | End: 2018-10-04
Payer: COMMERCIAL

## 2018-10-04 ENCOUNTER — HOSPITAL ENCOUNTER (OUTPATIENT)
Dept: NEUROLOGY | Age: 48
Discharge: HOME | End: 2018-10-04
Payer: COMMERCIAL

## 2018-10-04 DIAGNOSIS — M50.10 CERVICAL DISC DISORDER WITH RADICULOPATHY: ICD-10-CM

## 2018-10-04 DIAGNOSIS — M54.16 LUMBAR RADICULOPATHY: ICD-10-CM

## 2018-10-04 PROCEDURE — 95886 MUSC TEST DONE W/N TEST COMP: CPT

## 2018-10-04 PROCEDURE — 72156 MRI NECK SPINE W/O & W/DYE: CPT

## 2018-10-04 PROCEDURE — 95913 NRV CNDJ TEST 13/> STUDIES: CPT | Performed by: PSYCHIATRY & NEUROLOGY

## 2018-10-04 PROCEDURE — 95886 MUSC TEST DONE W/N TEST COMP: CPT | Performed by: PSYCHIATRY & NEUROLOGY

## 2018-10-04 PROCEDURE — 95913 NRV CNDJ TEST 13/> STUDIES: CPT

## 2018-10-04 PROCEDURE — 6360000004 HC RX CONTRAST MEDICATION: Performed by: NURSE PRACTITIONER

## 2018-10-04 PROCEDURE — 72148 MRI LUMBAR SPINE W/O DYE: CPT

## 2018-10-04 PROCEDURE — A9577 INJ MULTIHANCE: HCPCS | Performed by: NURSE PRACTITIONER

## 2018-10-04 RX ADMIN — GADOBENATE DIMEGLUMINE 16 ML: 529 INJECTION, SOLUTION INTRAVENOUS at 13:54

## 2018-11-05 DIAGNOSIS — M51.36 DDD (DEGENERATIVE DISC DISEASE), LUMBAR: ICD-10-CM

## 2018-11-06 RX ORDER — OXYCODONE AND ACETAMINOPHEN 10; 325 MG/1; MG/1
1 TABLET ORAL EVERY 8 HOURS PRN
Qty: 90 TABLET | Refills: 0 | Status: SHIPPED | OUTPATIENT
Start: 2018-11-09 | End: 2018-12-03 | Stop reason: SDUPTHER

## 2018-11-12 ENCOUNTER — HOSPITAL ENCOUNTER (OUTPATIENT)
Dept: PAIN MANAGEMENT | Age: 48
Discharge: HOME | End: 2018-11-12
Payer: COMMERCIAL

## 2018-11-12 VITALS
TEMPERATURE: 97.2 F | OXYGEN SATURATION: 97 % | WEIGHT: 183 LBS | HEIGHT: 70 IN | SYSTOLIC BLOOD PRESSURE: 133 MMHG | RESPIRATION RATE: 18 BRPM | HEART RATE: 102 BPM | DIASTOLIC BLOOD PRESSURE: 83 MMHG | BODY MASS INDEX: 26.2 KG/M2

## 2018-11-12 DIAGNOSIS — G56.03 BILATERAL CARPAL TUNNEL SYNDROME: Primary | ICD-10-CM

## 2018-11-12 DIAGNOSIS — M47.816 LUMBAR FACET ARTHROPATHY: Chronic | ICD-10-CM

## 2018-11-12 DIAGNOSIS — M79.18 MYOFASCIAL PAIN SYNDROME: Primary | ICD-10-CM

## 2018-11-12 DIAGNOSIS — M50.10 CERVICAL DISC DISORDER WITH RADICULOPATHY: ICD-10-CM

## 2018-11-12 PROBLEM — G56.00 CTS (CARPAL TUNNEL SYNDROME): Status: ACTIVE | Noted: 2018-11-12

## 2018-11-12 PROCEDURE — 99214 OFFICE O/P EST MOD 30 MIN: CPT

## 2018-11-12 PROCEDURE — 99214 OFFICE O/P EST MOD 30 MIN: CPT | Performed by: NURSE PRACTITIONER

## 2018-11-12 RX ORDER — CYCLOBENZAPRINE HCL 10 MG
10 TABLET ORAL 2 TIMES DAILY PRN
Qty: 60 TABLET | Refills: 2 | Status: SHIPPED | OUTPATIENT
Start: 2018-11-12 | End: 2019-04-09 | Stop reason: SDUPTHER

## 2018-11-12 ASSESSMENT — PAIN DESCRIPTION - DIRECTION: RADIATING_TOWARDS: INTO BILATERAL UPPER EXTREMITIES

## 2018-11-12 ASSESSMENT — PAIN DESCRIPTION - DESCRIPTORS: DESCRIPTORS: ACHING;CONSTANT;RADIATING;NUMBNESS

## 2018-11-12 ASSESSMENT — ENCOUNTER SYMPTOMS
BOWEL INCONTINENCE: 0
BACK PAIN: 1
CONSTIPATION: 0

## 2018-11-12 ASSESSMENT — PAIN DESCRIPTION - ONSET: ONSET: ON-GOING

## 2018-11-12 ASSESSMENT — PAIN DESCRIPTION - FREQUENCY: FREQUENCY: CONTINUOUS

## 2018-11-12 ASSESSMENT — PAIN DESCRIPTION - ORIENTATION: ORIENTATION: LOWER;UPPER

## 2018-11-12 ASSESSMENT — PAIN DESCRIPTION - PROGRESSION: CLINICAL_PROGRESSION: NOT CHANGED

## 2018-11-12 ASSESSMENT — PAIN DESCRIPTION - LOCATION: LOCATION: BACK;NECK

## 2018-11-12 ASSESSMENT — ACTIVITIES OF DAILY LIVING (ADL): EFFECT OF PAIN ON DAILY ACTIVITIES: LIMITS ACTIVITY

## 2018-11-12 ASSESSMENT — PAIN DESCRIPTION - PAIN TYPE: TYPE: CHRONIC PAIN

## 2018-11-12 ASSESSMENT — PAIN SCALES - GENERAL: PAINLEVEL_OUTOF10: 3

## 2018-11-12 NOTE — PROGRESS NOTES
[facility] Physical & Pain Medicine  Office Note    Patient Name: Raad Jacinto    MR #: [id number]    Account #: [de-identified]    : 1970     Age: 53 y.o. Sex: male    Date: 2018    PCP: NOEMI Mayer    Chief Complaint:   Chief Complaint   Patient presents with    Neck Pain    Lower Back Pain       History of Present Illness:   Raad Jacinto is a 53 y.o. male who presents to the office for a follow up MRI cervical spine W/WO contrast, MRI lumbar spine WO Contrast, and nerve conduction study with EMG. Patient also presents to the office for a follow up medication effectiveness. Patient was started on elavil, neurontin, and robaxin at last office visit. Patient stopped taking Elavil and gabapentin due to dizziness. Patient's stated Robaxin wasn't working went back on his Flexeril. He states that the Flexeril worked better with the myofascial pain. Patient's main complaint is pain radiating from his neck into his shoulder. After further evaluation of the left shoulder patient has decreased range of motion. 2018  Raad Jacinto is a 53 y.o. male who presents to the office following Cervical TP's on . Patient states that he had no relief from the TP's. Patient states that his pain is rapidly worsening. Patient stated that the pain in his neck used to be localized in his shoulders and is not radiating across his shoulders with numbness and tingling to left hand but he is having NEW numbness and tingling in his left hand. Patient states his low back pain is worsening as well. He states lifting cause instant muscle spasms. Other aggrevating factors are sitting and bending. He states that he has difficulty with sexual intercourse due to pain. NEW associated symptoms is numbness and tingling down his left leg to the bottom of his feet and his little toes. Back Pain   This is a chronic problem. The current episode started more than 1 year ago.  The mild disc bulging. There is a small left foraminal disc herniation extending into the inferior recess. There is mild facet arthropathy. There is no significant central spinal canal  narrowing. There is mild inferior recess foraminal narrowing bilaterally left greater than right. At L3-4, the disc is well-maintained. There is no disc herniation or significant disc bulging. There is no spinal or foraminal stenosis. At L2-3, there is disc narrowing and mild to moderate disc bulging producing dural sac compression. There is mild shortening of the pedicles. There is mild central spinal canal narrowing. There is no significant foraminal stenosis. At L1-2, there is mild disc narrowing and mild disc bulging. This produces minimal dural sac compression. This causes minimal central spinal canal narrowing. There is no significant foraminal stenosis. At T12-L1, there is mild disc bulging producing dural sac compression. There is no cord compression. There is no significant spinal or foraminal stenosis. The inferior tip of the conus is at the L1 level. The visualized lower thoracic spinal cord demonstrates no signal abnormality. Impression  Degenerative changes at multiple levels. Please see the above report. Signed by Dr Mary Abdi on 9/22/2017 5:32 PM    EMG/EEG  Bilateral mild carpal tunnel syndrome otherwise unremarkable. Per report of Bilateral Upper and Lower Extremities. Assessment:  Active Problems:    Chronic back pain    Neck pain    Osteoarthritis of spine with radiculopathy, cervical region    Cervical disc disorder with radiculopathy    Lumbar facet arthropathy    Sacroiliitis (HCC)    Myofascial pain    Lumbar radiculopathy    Numbness  Resolved Problems:    * No resolved hospital problems. *      Plan:  1. DME for bilateral wrist splints. Nerve conduction study and firm to carpal tunnel syndrome. Patient complains of most numbness and tingling in hands at bedtime.  Patient declined

## 2018-11-21 ENCOUNTER — OFFICE VISIT (OUTPATIENT)
Dept: SURGERY | Age: 48
End: 2018-11-21
Payer: COMMERCIAL

## 2018-11-21 VITALS
DIASTOLIC BLOOD PRESSURE: 70 MMHG | TEMPERATURE: 98.4 F | BODY MASS INDEX: 27.46 KG/M2 | WEIGHT: 191.8 LBS | SYSTOLIC BLOOD PRESSURE: 118 MMHG | HEIGHT: 70 IN

## 2018-11-21 DIAGNOSIS — R19.09 GROIN MASS: Primary | ICD-10-CM

## 2018-11-21 PROCEDURE — 99212 OFFICE O/P EST SF 10 MIN: CPT | Performed by: PHYSICIAN ASSISTANT

## 2018-11-29 ENCOUNTER — OFFICE VISIT (OUTPATIENT)
Dept: FAMILY MEDICINE CLINIC | Age: 48
End: 2018-11-29
Payer: COMMERCIAL

## 2018-11-29 VITALS
WEIGHT: 191 LBS | OXYGEN SATURATION: 96 % | BODY MASS INDEX: 26.74 KG/M2 | DIASTOLIC BLOOD PRESSURE: 84 MMHG | HEIGHT: 71 IN | HEART RATE: 88 BPM | SYSTOLIC BLOOD PRESSURE: 126 MMHG

## 2018-11-29 DIAGNOSIS — J40 BRONCHITIS: Primary | ICD-10-CM

## 2018-11-29 PROCEDURE — 96372 THER/PROPH/DIAG INJ SC/IM: CPT | Performed by: INTERNAL MEDICINE

## 2018-11-29 PROCEDURE — 99213 OFFICE O/P EST LOW 20 MIN: CPT | Performed by: INTERNAL MEDICINE

## 2018-11-29 RX ORDER — TRIAMCINOLONE ACETONIDE 40 MG/ML
40 INJECTION, SUSPENSION INTRA-ARTICULAR; INTRAMUSCULAR ONCE
Status: COMPLETED | OUTPATIENT
Start: 2018-11-29 | End: 2018-11-29

## 2018-11-29 RX ORDER — BENZONATATE 100 MG/1
100 CAPSULE ORAL 3 TIMES DAILY PRN
Qty: 30 CAPSULE | Refills: 0 | Status: SHIPPED | OUTPATIENT
Start: 2018-11-29 | End: 2018-12-06

## 2018-11-29 RX ORDER — PREDNISONE 20 MG/1
20 TABLET ORAL DAILY
Qty: 7 TABLET | Refills: 0 | Status: SHIPPED | OUTPATIENT
Start: 2018-11-29 | End: 2018-12-06

## 2018-11-29 RX ORDER — AZITHROMYCIN 250 MG/1
250 TABLET, FILM COATED ORAL SEE ADMIN INSTRUCTIONS
Qty: 6 TABLET | Refills: 0 | Status: SHIPPED | OUTPATIENT
Start: 2018-11-29 | End: 2018-12-04

## 2018-11-29 RX ADMIN — TRIAMCINOLONE ACETONIDE 40 MG: 40 INJECTION, SUSPENSION INTRA-ARTICULAR; INTRAMUSCULAR at 11:34

## 2018-11-29 ASSESSMENT — ENCOUNTER SYMPTOMS
COUGH: 1
DIARRHEA: 0
BACK PAIN: 0
SHORTNESS OF BREATH: 0
SORE THROAT: 0
VOMITING: 0
RHINORRHEA: 1
NAUSEA: 0
SINUS PRESSURE: 1
SINUS PAIN: 1
CONSTIPATION: 0

## 2018-12-03 DIAGNOSIS — M51.36 DDD (DEGENERATIVE DISC DISEASE), LUMBAR: ICD-10-CM

## 2018-12-04 RX ORDER — OXYCODONE AND ACETAMINOPHEN 10; 325 MG/1; MG/1
1 TABLET ORAL EVERY 8 HOURS PRN
Qty: 90 TABLET | Refills: 0 | Status: SHIPPED | OUTPATIENT
Start: 2018-12-09 | End: 2019-01-02 | Stop reason: SDUPTHER

## 2018-12-11 ENCOUNTER — HOSPITAL ENCOUNTER (OUTPATIENT)
Dept: PAIN MANAGEMENT | Age: 48
Discharge: HOME | End: 2018-12-11
Payer: COMMERCIAL

## 2018-12-11 VITALS
RESPIRATION RATE: 18 BRPM | WEIGHT: 191 LBS | OXYGEN SATURATION: 98 % | BODY MASS INDEX: 26.74 KG/M2 | TEMPERATURE: 97.1 F | HEART RATE: 96 BPM | DIASTOLIC BLOOD PRESSURE: 79 MMHG | HEIGHT: 71 IN | SYSTOLIC BLOOD PRESSURE: 127 MMHG

## 2018-12-11 DIAGNOSIS — M50.10 CERVICAL DISC DISORDER WITH RADICULOPATHY: ICD-10-CM

## 2018-12-11 DIAGNOSIS — R20.0 NUMBNESS: ICD-10-CM

## 2018-12-11 DIAGNOSIS — M54.2 NECK PAIN: Chronic | ICD-10-CM

## 2018-12-11 DIAGNOSIS — M79.18 MYOFASCIAL PAIN: Primary | ICD-10-CM

## 2018-12-11 PROCEDURE — 99213 OFFICE O/P EST LOW 20 MIN: CPT

## 2018-12-11 PROCEDURE — 99214 OFFICE O/P EST MOD 30 MIN: CPT | Performed by: NURSE PRACTITIONER

## 2018-12-11 ASSESSMENT — PAIN DESCRIPTION - ORIENTATION: ORIENTATION: UPPER;LEFT

## 2018-12-11 ASSESSMENT — PAIN SCALES - GENERAL: PAINLEVEL_OUTOF10: 4

## 2018-12-11 ASSESSMENT — PAIN DESCRIPTION - FREQUENCY: FREQUENCY: CONTINUOUS

## 2018-12-11 ASSESSMENT — PAIN DESCRIPTION - ONSET: ONSET: ON-GOING

## 2018-12-11 ASSESSMENT — PAIN DESCRIPTION - PAIN TYPE: TYPE: CHRONIC PAIN

## 2018-12-11 ASSESSMENT — ENCOUNTER SYMPTOMS
CONSTIPATION: 0
BACK PAIN: 1
BOWEL INCONTINENCE: 0

## 2018-12-11 ASSESSMENT — PAIN DESCRIPTION - PROGRESSION: CLINICAL_PROGRESSION: NOT CHANGED

## 2018-12-11 ASSESSMENT — ACTIVITIES OF DAILY LIVING (ADL): EFFECT OF PAIN ON DAILY ACTIVITIES: LIMITS ACTIVITIES

## 2018-12-11 NOTE — PROGRESS NOTES
motion. 9/12/2018  Jono Zamroa is a 53 y.o. male who presents to the office following Cervical TP's on 7/18. Patient states that he had no relief from the TP's. Patient states that his pain is rapidly worsening. Patient stated that the pain in his neck used to be localized in his shoulders and is not radiating across his shoulders with numbness and tingling to left hand but he is having NEW numbness and tingling in his left hand. Patient states his low back pain is worsening as well. He states lifting cause instant muscle spasms. Other aggrevating factors are sitting and bending. He states that he has difficulty with sexual intercourse due to pain. NEW associated symptoms is numbness and tingling down his left leg to the bottom of his feet and his little toes. Back Pain   This is a chronic problem. The current episode started more than 1 year ago. The problem occurs constantly. The problem has been rapidly worsening since onset. The pain is present in the lumbar spine and sacro-iliac. The quality of the pain is described as aching, burning and stabbing. The pain radiates to the left thigh, left knee and left foot. The pain is at a severity of 4/10. The pain is severe. The symptoms are aggravated by bending and sitting (lifting and sexual intercouse). Associated symptoms include headaches, leg pain, numbness, paresthesias and tingling. Pertinent negatives include no bladder incontinence, bowel incontinence or weakness. He has tried analgesics, heat, NSAIDs and muscle relaxant for the symptoms. The treatment provided mild relief. Neck Pain    This is a chronic problem. The current episode started more than 1 month ago. The problem occurs constantly. The problem has been gradually worsening. The quality of the pain is described as burning and aching (down neck and left arm). The pain is at a severity of 4/10. Associated symptoms include headaches, leg pain, numbness and tingling.  Pertinent negatives

## 2018-12-18 ENCOUNTER — HOSPITAL ENCOUNTER (OUTPATIENT)
Dept: PAIN MANAGEMENT | Age: 48
Discharge: HOME | End: 2018-12-18
Payer: COMMERCIAL

## 2018-12-18 VITALS
DIASTOLIC BLOOD PRESSURE: 96 MMHG | OXYGEN SATURATION: 100 % | SYSTOLIC BLOOD PRESSURE: 149 MMHG | HEART RATE: 111 BPM | BODY MASS INDEX: 26.74 KG/M2 | WEIGHT: 191 LBS | RESPIRATION RATE: 18 BRPM | HEIGHT: 71 IN | TEMPERATURE: 96.8 F

## 2018-12-18 DIAGNOSIS — G89.29 CHRONIC LEFT SHOULDER PAIN: ICD-10-CM

## 2018-12-18 DIAGNOSIS — M25.512 CHRONIC LEFT SHOULDER PAIN: ICD-10-CM

## 2018-12-18 DIAGNOSIS — M50.10 CERVICAL DISC DISORDER WITH RADICULOPATHY: ICD-10-CM

## 2018-12-18 PROCEDURE — 20611 DRAIN/INJ JOINT/BURSA W/US: CPT

## 2018-12-18 PROCEDURE — 20553 NJX 1/MLT TRIGGER POINTS 3/>: CPT | Performed by: NURSE PRACTITIONER

## 2018-12-18 PROCEDURE — 20611 DRAIN/INJ JOINT/BURSA W/US: CPT | Performed by: NURSE PRACTITIONER

## 2018-12-18 PROCEDURE — 20553 NJX 1/MLT TRIGGER POINTS 3/>: CPT

## 2018-12-18 PROCEDURE — 6360000002 HC RX W HCPCS

## 2018-12-18 PROCEDURE — 2500000003 HC RX 250 WO HCPCS

## 2018-12-18 RX ORDER — LIDOCAINE HYDROCHLORIDE 10 MG/ML
INJECTION, SOLUTION EPIDURAL; INFILTRATION; INTRACAUDAL; PERINEURAL
Status: COMPLETED | OUTPATIENT
Start: 2018-12-18 | End: 2018-12-18

## 2018-12-18 RX ORDER — TRIAMCINOLONE ACETONIDE 40 MG/ML
INJECTION, SUSPENSION INTRA-ARTICULAR; INTRAMUSCULAR
Status: COMPLETED | OUTPATIENT
Start: 2018-12-18 | End: 2018-12-18

## 2018-12-18 RX ORDER — BUPIVACAINE HYDROCHLORIDE 5 MG/ML
INJECTION, SOLUTION EPIDURAL; INTRACAUDAL
Status: COMPLETED | OUTPATIENT
Start: 2018-12-18 | End: 2018-12-18

## 2018-12-18 RX ADMIN — LIDOCAINE HYDROCHLORIDE 11 ML: 10 INJECTION, SOLUTION EPIDURAL; INFILTRATION; INTRACAUDAL; PERINEURAL at 10:58

## 2018-12-18 RX ADMIN — BUPIVACAINE HYDROCHLORIDE 11 ML: 5 INJECTION, SOLUTION EPIDURAL; INTRACAUDAL at 10:57

## 2018-12-18 RX ADMIN — TRIAMCINOLONE ACETONIDE 40 MG: 40 INJECTION, SUSPENSION INTRA-ARTICULAR; INTRAMUSCULAR at 10:58

## 2018-12-18 ASSESSMENT — PAIN - FUNCTIONAL ASSESSMENT: PAIN_FUNCTIONAL_ASSESSMENT: 0-10

## 2018-12-18 ASSESSMENT — ACTIVITIES OF DAILY LIVING (ADL): EFFECT OF PAIN ON DAILY ACTIVITIES: LIMITS ACTIVITIES

## 2018-12-18 NOTE — PROCEDURES
[facility] Physical & Pain Medicine    Patient Name: Kiersten Daniel    : 1970    Age: 53 y.o. Sex: male    Date: 2018    Preop Diagnosis: Osteoarthritis of  [] Right  [x] Left  [] Bilateral Shoulder(s)    Postop Diagnosis: Osteoarthritis of [] Right  [x] Left  [] Bilateral Shoulder(s)    Procedure: Steroid Injection of  [] Right  [x] Left  [] Bilateral Shoulder(s) with US guidance    Performing Procedure:  WILLIAMS Murry APRN    Previously Had Procedure:  [] Yes   [x] No    Patient Vitals for the past 24 hrs:   BP Temp Temp src Pulse Resp SpO2 Height Weight   18 1019 (!) 149/96 96.8 °F (36 °C) Oral 111 18 100 % 5' 11\" (1.803 m) 191 lb (86.6 kg)       Description of Procedure:    After a brief physical assessment and failure to improve with conservative measures the patient presented for an injection of the [] Right  [x] Left  [] Bilateral Shoulder(s). The indications, limitations and possible complications were discussed with the patient and the patient elected to proceed with the procedure. [] Right Shoulder    After voluntary, informed and signed consent obtained the patient was placed in a sitting position with the patients right arm placed to the side and elbow flexed at 90 degrees. Appropriate time out was obtained per policy. The proposed injection site was palpated at the point of maximal tenderness [] Anterior  [] Posterior and the skin over the proposed injection entry point was marked. The skin was then sprayed with Gebauer's Solution while protecting patients eyes and prepped in a sterile fashion with Prevantics swab. Under sterile technique a 22 gauge 1 1/2 inch needle was introduced and after a negative aspiration a solution of 1 ml of 0.5% Marcaine Plain, 1 ml of 1% Lidocaine Plain and 1 ml of Kenalog (40mg/ml) was injected. The needle was withdrawn and a sterile dressing applied.      [x] Left Shoulder     After voluntary, 1019 (!) 149/96 96.8 °F (36 °C) Oral 111 18 100 % 5' 11\" (1.803 m) 191 lb (86.6 kg)       Description of Procedure:    After a brief physical assessment and failure to improve with conservative measures the patient presented for Cervical Trigger Point Injections The indications, limitations and possible complications were discussed with the patient and the patient elected to proceed with the procedure. After voluntary, informed and signed consent obtained the patient was placed in a seated position. Appropriate time out was obtained per policy. The area of maximal tenderness was palpated over the  [] Right  [] Left  [x] Bilateral Cervical  [x] Splenius  [x] Trapezius  [x] Rhomboid. The skin overlying these areas was marked with a skin marker. The skin overlying the proposed injection sites were then sprayed with Gebauer's Solution while protecting patient eyes. [] yes  [x] no    The areas were then prepped in a sterile fashion with Chloro-Prep. Each trigger point of the  [] Right   [] Left  [x] Bilateral Cervical  [x] Splenius  [x] Trapezius  [x] Rhomboid was injected with approximately 2 ml of a solution of 5 ml of 1% Lidocaine Plain and 5 ml of 0.5% Marcaine Plain    [] with Decadron 0.5 ml (10mg/ml)   [] with Toradol 0.5 ml (15mg/ml)  (approximately 20 ml total) using a 25 gauge 1 1/2 inch needle. Sterile dressings applied. Discharge: The patient tolerated the procedure well. There were no complications during the procedure and the patient was discharged home with discharge instructions. The patient has been instructed to contact the office should there be any complications or questions to arise between today and their next appointment.     Plan:  [x] Will return to the office in   [] 1 month  [x] 4 - 6 weeks  [] 2 months   [] 3 months for:  [] Planned Procedure  [x] Procedure Follow-up   [x] Office Visit      NOEMI Blum CNP, 12/18/2018 at 11:04 AM

## 2018-12-18 NOTE — PROGRESS NOTES
Procedure: Cervical trigger points and left shoulder injection  Level of Consciousness: [x]Alert [x]Oriented []Disoriented []Lethargic  Anxiety Level: [x]Calm []Anxious []Depressed []Other  Skin: [x]Warm [x]Dry []Cool []Moist []Intact []Other  Cardiovascular: []Palpitations: [x]Never []Occasionally []Frequently  Chest Pain: [x]No []Yes  Respiratory:  [x]Unlabored []Labored []Cough ([] Productive []Unproductive)  HCG Required: [x]No []Yes   Results: []Negative []Positive  Knowledge Level:        [x]Patient/Other verbalized understanding of pre-procedure instructions. [x]Assessment of post-op care needs (transportation, responsible caregiver)        [x]Able to discuss health care problems and how to deal with it.   Factors that Affect Teaching:        Language Barrier: [x]No []Yes - why:        Hearing Loss:        [x]No []Yes            Corrective Device:  []Yes []No        Vision Loss:           []No [x]Yes            Corrective Device:  [x]Yes []No        Memory Loss:       [x]No []Yes            []Short Term []Long Term  Motivational Level:  [x]Asks Questions                  []Extremely Anxious       [x]Seems Interested               []Seems Uninterested                  [x]Denies need for Education  Risk for Injury:  [x]Patient oriented to person, place and time  []History of frequent falls/loss of balance  Nutritional:  []Change in appetite   []Weight Gain   []Weight Loss  Functional:  []Requires assistance with ADL's

## 2019-01-02 DIAGNOSIS — M51.36 DDD (DEGENERATIVE DISC DISEASE), LUMBAR: ICD-10-CM

## 2019-01-02 RX ORDER — OXYCODONE AND ACETAMINOPHEN 10; 325 MG/1; MG/1
1 TABLET ORAL EVERY 8 HOURS PRN
Qty: 90 TABLET | Refills: 0 | Status: SHIPPED | OUTPATIENT
Start: 2019-01-08 | End: 2019-01-29 | Stop reason: SDUPTHER

## 2019-01-04 ENCOUNTER — HOSPITAL ENCOUNTER (OUTPATIENT)
Dept: PREADMISSION TESTING | Age: 49
Setting detail: OUTPATIENT SURGERY
Discharge: HOME | End: 2019-01-08

## 2019-01-04 ENCOUNTER — ANESTHESIA EVENT (OUTPATIENT)
Dept: OPERATING ROOM | Age: 49
End: 2019-01-04

## 2019-01-04 ENCOUNTER — HOSPITAL ENCOUNTER (OUTPATIENT)
Dept: NON INVASIVE DIAGNOSTICS | Age: 49
Discharge: HOME | End: 2019-01-04
Payer: COMMERCIAL

## 2019-01-04 VITALS — HEIGHT: 70 IN | WEIGHT: 190 LBS | BODY MASS INDEX: 27.2 KG/M2

## 2019-01-04 PROCEDURE — 93005 ELECTROCARDIOGRAM TRACING: CPT

## 2019-01-10 ENCOUNTER — ANESTHESIA (OUTPATIENT)
Dept: OPERATING ROOM | Age: 49
End: 2019-01-10

## 2019-01-10 ENCOUNTER — HOSPITAL ENCOUNTER (OUTPATIENT)
Age: 49
Setting detail: OUTPATIENT SURGERY
Discharge: HOME | End: 2019-01-10
Attending: SURGERY | Admitting: SURGERY
Payer: COMMERCIAL

## 2019-01-10 ENCOUNTER — HOSPITAL ENCOUNTER (OUTPATIENT)
Age: 49
Setting detail: SPECIMEN
Discharge: HOME | End: 2019-01-10
Payer: COMMERCIAL

## 2019-01-10 VITALS
SYSTOLIC BLOOD PRESSURE: 122 MMHG | DIASTOLIC BLOOD PRESSURE: 82 MMHG | RESPIRATION RATE: 1 BRPM | OXYGEN SATURATION: 95 %

## 2019-01-10 VITALS
RESPIRATION RATE: 18 BRPM | DIASTOLIC BLOOD PRESSURE: 90 MMHG | WEIGHT: 190 LBS | OXYGEN SATURATION: 100 % | SYSTOLIC BLOOD PRESSURE: 135 MMHG | TEMPERATURE: 97.3 F | BODY MASS INDEX: 27.2 KG/M2 | HEIGHT: 70 IN | HEART RATE: 90 BPM

## 2019-01-10 PROCEDURE — G8916 PT W IV AB GIVEN ON TIME: HCPCS | Performed by: NURSE PRACTITIONER

## 2019-01-10 PROCEDURE — 99999 PR OFFICE/OUTPT VISIT,PROCEDURE ONLY: CPT | Performed by: PHYSICIAN ASSISTANT

## 2019-01-10 PROCEDURE — 27043 EXC HIP PELVIS LES SC 3 CM/>: CPT

## 2019-01-10 PROCEDURE — 27043 EXC HIP PELVIS LES SC 3 CM/>: CPT | Performed by: SURGERY

## 2019-01-10 PROCEDURE — G8907 PT DOC NO EVENTS ON DISCHARG: HCPCS | Performed by: NURSE PRACTITIONER

## 2019-01-10 PROCEDURE — 88307 TISSUE EXAM BY PATHOLOGIST: CPT

## 2019-01-10 RX ORDER — BUPIVACAINE HYDROCHLORIDE AND EPINEPHRINE 2.5; 5 MG/ML; UG/ML
INJECTION, SOLUTION INFILTRATION; PERINEURAL PRN
Status: DISCONTINUED | OUTPATIENT
Start: 2019-01-10 | End: 2019-01-10 | Stop reason: HOSPADM

## 2019-01-10 RX ORDER — OXYCODONE HYDROCHLORIDE AND ACETAMINOPHEN 5; 325 MG/1; MG/1
1 TABLET ORAL PRN
Status: COMPLETED | OUTPATIENT
Start: 2019-01-10 | End: 2019-01-10

## 2019-01-10 RX ORDER — MIDAZOLAM HYDROCHLORIDE 1 MG/ML
INJECTION INTRAMUSCULAR; INTRAVENOUS PRN
Status: DISCONTINUED | OUTPATIENT
Start: 2019-01-10 | End: 2019-01-10 | Stop reason: SDUPTHER

## 2019-01-10 RX ORDER — HYDRALAZINE HYDROCHLORIDE 20 MG/ML
5 INJECTION INTRAMUSCULAR; INTRAVENOUS EVERY 10 MIN PRN
Status: DISCONTINUED | OUTPATIENT
Start: 2019-01-10 | End: 2019-01-10 | Stop reason: HOSPADM

## 2019-01-10 RX ORDER — SODIUM CHLORIDE, SODIUM LACTATE, POTASSIUM CHLORIDE, CALCIUM CHLORIDE 600; 310; 30; 20 MG/100ML; MG/100ML; MG/100ML; MG/100ML
INJECTION, SOLUTION INTRAVENOUS CONTINUOUS
Status: DISCONTINUED | OUTPATIENT
Start: 2019-01-10 | End: 2019-01-10 | Stop reason: HOSPADM

## 2019-01-10 RX ORDER — PROPOFOL 10 MG/ML
INJECTION, EMULSION INTRAVENOUS PRN
Status: DISCONTINUED | OUTPATIENT
Start: 2019-01-10 | End: 2019-01-10 | Stop reason: SDUPTHER

## 2019-01-10 RX ORDER — MEPERIDINE HYDROCHLORIDE 25 MG/ML
12.5 INJECTION INTRAMUSCULAR; INTRAVENOUS; SUBCUTANEOUS EVERY 5 MIN PRN
Status: DISCONTINUED | OUTPATIENT
Start: 2019-01-10 | End: 2019-01-10 | Stop reason: HOSPADM

## 2019-01-10 RX ORDER — ONDANSETRON 2 MG/ML
4 INJECTION INTRAMUSCULAR; INTRAVENOUS
Status: DISCONTINUED | OUTPATIENT
Start: 2019-01-10 | End: 2019-01-10 | Stop reason: HOSPADM

## 2019-01-10 RX ORDER — ONDANSETRON 4 MG/1
8 TABLET, FILM COATED ORAL
Status: CANCELLED | OUTPATIENT
Start: 2019-01-10 | End: 2019-01-10

## 2019-01-10 RX ORDER — LIDOCAINE HYDROCHLORIDE 10 MG/ML
1 INJECTION, SOLUTION EPIDURAL; INFILTRATION; INTRACAUDAL; PERINEURAL
Status: DISCONTINUED | OUTPATIENT
Start: 2019-01-10 | End: 2019-01-10 | Stop reason: HOSPADM

## 2019-01-10 RX ORDER — OXYCODONE HYDROCHLORIDE AND ACETAMINOPHEN 5; 325 MG/1; MG/1
2 TABLET ORAL PRN
Status: COMPLETED | OUTPATIENT
Start: 2019-01-10 | End: 2019-01-10

## 2019-01-10 RX ORDER — PROMETHAZINE HYDROCHLORIDE 25 MG/ML
12.5 INJECTION, SOLUTION INTRAMUSCULAR; INTRAVENOUS
Status: DISCONTINUED | OUTPATIENT
Start: 2019-01-10 | End: 2019-01-10 | Stop reason: HOSPADM

## 2019-01-10 RX ORDER — FENTANYL CITRATE 50 UG/ML
50 INJECTION, SOLUTION INTRAMUSCULAR; INTRAVENOUS EVERY 5 MIN PRN
Status: DISCONTINUED | OUTPATIENT
Start: 2019-01-10 | End: 2019-01-10 | Stop reason: HOSPADM

## 2019-01-10 RX ORDER — FENTANYL CITRATE 50 UG/ML
INJECTION, SOLUTION INTRAMUSCULAR; INTRAVENOUS PRN
Status: DISCONTINUED | OUTPATIENT
Start: 2019-01-10 | End: 2019-01-10 | Stop reason: SDUPTHER

## 2019-01-10 RX ORDER — ONDANSETRON 2 MG/ML
INJECTION INTRAMUSCULAR; INTRAVENOUS PRN
Status: DISCONTINUED | OUTPATIENT
Start: 2019-01-10 | End: 2019-01-10 | Stop reason: SDUPTHER

## 2019-01-10 RX ORDER — DEXAMETHASONE SODIUM PHOSPHATE 4 MG/ML
INJECTION, SOLUTION INTRA-ARTICULAR; INTRALESIONAL; INTRAMUSCULAR; INTRAVENOUS; SOFT TISSUE PRN
Status: DISCONTINUED | OUTPATIENT
Start: 2019-01-10 | End: 2019-01-10 | Stop reason: SDUPTHER

## 2019-01-10 RX ORDER — DIPHENHYDRAMINE HYDROCHLORIDE 50 MG/ML
12.5 INJECTION INTRAMUSCULAR; INTRAVENOUS
Status: DISCONTINUED | OUTPATIENT
Start: 2019-01-10 | End: 2019-01-10 | Stop reason: HOSPADM

## 2019-01-10 RX ORDER — GINSENG 100 MG
CAPSULE ORAL PRN
Status: DISCONTINUED | OUTPATIENT
Start: 2019-01-10 | End: 2019-01-10 | Stop reason: HOSPADM

## 2019-01-10 RX ORDER — LABETALOL HYDROCHLORIDE 5 MG/ML
5 INJECTION, SOLUTION INTRAVENOUS EVERY 10 MIN PRN
Status: DISCONTINUED | OUTPATIENT
Start: 2019-01-10 | End: 2019-01-10 | Stop reason: HOSPADM

## 2019-01-10 RX ADMIN — SODIUM CHLORIDE, SODIUM LACTATE, POTASSIUM CHLORIDE, CALCIUM CHLORIDE: 600; 310; 30; 20 INJECTION, SOLUTION INTRAVENOUS at 07:44

## 2019-01-10 RX ADMIN — OXYCODONE HYDROCHLORIDE AND ACETAMINOPHEN 2 TABLET: 5; 325 TABLET ORAL at 09:58

## 2019-01-10 RX ADMIN — ONDANSETRON 4 MG: 2 INJECTION INTRAMUSCULAR; INTRAVENOUS at 09:05

## 2019-01-10 RX ADMIN — PROPOFOL 80 MG: 10 INJECTION, EMULSION INTRAVENOUS at 08:41

## 2019-01-10 RX ADMIN — FENTANYL CITRATE 50 MCG: 50 INJECTION, SOLUTION INTRAMUSCULAR; INTRAVENOUS at 08:40

## 2019-01-10 RX ADMIN — PROPOFOL 100 MG: 10 INJECTION, EMULSION INTRAVENOUS at 08:42

## 2019-01-10 RX ADMIN — MIDAZOLAM HYDROCHLORIDE 1 MG: 1 INJECTION INTRAMUSCULAR; INTRAVENOUS at 08:40

## 2019-01-10 RX ADMIN — DEXAMETHASONE SODIUM PHOSPHATE 4 MG: 4 INJECTION, SOLUTION INTRA-ARTICULAR; INTRALESIONAL; INTRAMUSCULAR; INTRAVENOUS; SOFT TISSUE at 09:05

## 2019-01-10 ASSESSMENT — PAIN SCALES - GENERAL: PAINLEVEL_OUTOF10: 8

## 2019-01-10 ASSESSMENT — COPD QUESTIONNAIRES: CAT_SEVERITY: NO INTERVAL CHANGE

## 2019-01-21 ENCOUNTER — TELEPHONE (OUTPATIENT)
Dept: NEUROSURGERY | Age: 49
End: 2019-01-21

## 2019-01-28 ENCOUNTER — OFFICE VISIT (OUTPATIENT)
Dept: SURGERY | Age: 49
End: 2019-01-28

## 2019-01-28 VITALS
TEMPERATURE: 98.2 F | SYSTOLIC BLOOD PRESSURE: 120 MMHG | DIASTOLIC BLOOD PRESSURE: 70 MMHG | WEIGHT: 187.4 LBS | HEIGHT: 70 IN | BODY MASS INDEX: 26.83 KG/M2

## 2019-01-28 DIAGNOSIS — R19.09 GROIN MASS: Primary | ICD-10-CM

## 2019-01-28 PROCEDURE — 99024 POSTOP FOLLOW-UP VISIT: CPT | Performed by: PHYSICIAN ASSISTANT

## 2019-01-29 ENCOUNTER — HOSPITAL ENCOUNTER (OUTPATIENT)
Dept: PAIN MANAGEMENT | Age: 49
Discharge: HOME | End: 2019-01-29
Payer: COMMERCIAL

## 2019-01-29 VITALS
DIASTOLIC BLOOD PRESSURE: 98 MMHG | OXYGEN SATURATION: 98 % | SYSTOLIC BLOOD PRESSURE: 141 MMHG | HEART RATE: 105 BPM | BODY MASS INDEX: 26.77 KG/M2 | HEIGHT: 70 IN | TEMPERATURE: 97.2 F | RESPIRATION RATE: 18 BRPM | WEIGHT: 187 LBS

## 2019-01-29 DIAGNOSIS — M50.10 CERVICAL DISC DISORDER WITH RADICULOPATHY: ICD-10-CM

## 2019-01-29 DIAGNOSIS — M51.36 DDD (DEGENERATIVE DISC DISEASE), LUMBAR: ICD-10-CM

## 2019-01-29 DIAGNOSIS — M54.6 ACUTE LEFT-SIDED THORACIC BACK PAIN: Primary | ICD-10-CM

## 2019-01-29 PROCEDURE — 99214 OFFICE O/P EST MOD 30 MIN: CPT

## 2019-01-29 PROCEDURE — 99214 OFFICE O/P EST MOD 30 MIN: CPT | Performed by: NURSE PRACTITIONER

## 2019-01-29 RX ORDER — OXCARBAZEPINE 300 MG/1
300 TABLET, FILM COATED ORAL 2 TIMES DAILY
Qty: 60 TABLET | Refills: 1 | Status: SHIPPED | OUTPATIENT
Start: 2019-01-29 | End: 2019-04-09 | Stop reason: SINTOL

## 2019-01-29 ASSESSMENT — ACTIVITIES OF DAILY LIVING (ADL): EFFECT OF PAIN ON DAILY ACTIVITIES: LIMITS ACTIVITY

## 2019-01-29 ASSESSMENT — PAIN - FUNCTIONAL ASSESSMENT: PAIN_FUNCTIONAL_ASSESSMENT: PREVENTS OR INTERFERES SOME ACTIVE ACTIVITIES AND ADLS

## 2019-01-29 ASSESSMENT — ENCOUNTER SYMPTOMS
BACK PAIN: 1
CONSTIPATION: 0

## 2019-01-29 ASSESSMENT — PAIN DESCRIPTION - ONSET: ONSET: ON-GOING

## 2019-01-29 ASSESSMENT — PAIN DESCRIPTION - PROGRESSION: CLINICAL_PROGRESSION: NOT CHANGED

## 2019-01-29 ASSESSMENT — PAIN DESCRIPTION - LOCATION: LOCATION: BACK;NECK;SHOULDER

## 2019-01-29 ASSESSMENT — PAIN SCALES - GENERAL: PAINLEVEL_OUTOF10: 6

## 2019-01-29 ASSESSMENT — PAIN DESCRIPTION - PAIN TYPE: TYPE: CHRONIC PAIN

## 2019-01-29 ASSESSMENT — PAIN DESCRIPTION - DESCRIPTORS: DESCRIPTORS: ACHING;CONSTANT;SHARP;STABBING

## 2019-01-29 ASSESSMENT — PAIN DESCRIPTION - FREQUENCY: FREQUENCY: CONTINUOUS

## 2019-01-29 ASSESSMENT — PAIN DESCRIPTION - ORIENTATION: ORIENTATION: LOWER;MID;LEFT

## 2019-01-31 ENCOUNTER — HOSPITAL ENCOUNTER (OUTPATIENT)
Dept: GENERAL RADIOLOGY | Age: 49
Discharge: HOME | End: 2019-01-31
Payer: COMMERCIAL

## 2019-01-31 ENCOUNTER — OFFICE VISIT (OUTPATIENT)
Dept: NEUROSURGERY | Age: 49
End: 2019-01-31
Payer: COMMERCIAL

## 2019-01-31 VITALS
HEART RATE: 104 BPM | BODY MASS INDEX: 27.06 KG/M2 | SYSTOLIC BLOOD PRESSURE: 131 MMHG | WEIGHT: 189 LBS | HEIGHT: 70 IN | DIASTOLIC BLOOD PRESSURE: 88 MMHG | RESPIRATION RATE: 18 BRPM

## 2019-01-31 DIAGNOSIS — G89.29 CHRONIC MIDLINE LOW BACK PAIN WITH LEFT-SIDED SCIATICA: Primary | ICD-10-CM

## 2019-01-31 DIAGNOSIS — Z98.1 S/P CERVICAL SPINAL FUSION: ICD-10-CM

## 2019-01-31 DIAGNOSIS — M54.42 CHRONIC MIDLINE LOW BACK PAIN WITH LEFT-SIDED SCIATICA: Primary | ICD-10-CM

## 2019-01-31 DIAGNOSIS — M54.6 ACUTE LEFT-SIDED THORACIC BACK PAIN: ICD-10-CM

## 2019-01-31 DIAGNOSIS — M79.652 LEFT THIGH PAIN: ICD-10-CM

## 2019-01-31 PROCEDURE — 72072 X-RAY EXAM THORAC SPINE 3VWS: CPT

## 2019-01-31 PROCEDURE — 99214 OFFICE O/P EST MOD 30 MIN: CPT | Performed by: NURSE PRACTITIONER

## 2019-01-31 RX ORDER — OXYCODONE AND ACETAMINOPHEN 10; 325 MG/1; MG/1
1 TABLET ORAL EVERY 8 HOURS PRN
Qty: 90 TABLET | Refills: 0 | Status: SHIPPED | OUTPATIENT
Start: 2019-02-07 | End: 2019-03-05 | Stop reason: SDUPTHER

## 2019-02-01 ENCOUNTER — TELEPHONE (OUTPATIENT)
Dept: PAIN MANAGEMENT | Age: 49
End: 2019-02-01

## 2019-02-06 ENCOUNTER — HOSPITAL ENCOUNTER (OUTPATIENT)
Dept: PAIN MANAGEMENT | Age: 49
Discharge: HOME | End: 2019-02-06
Payer: COMMERCIAL

## 2019-02-06 VITALS
TEMPERATURE: 98.8 F | RESPIRATION RATE: 20 BRPM | HEART RATE: 94 BPM | SYSTOLIC BLOOD PRESSURE: 127 MMHG | DIASTOLIC BLOOD PRESSURE: 82 MMHG | OXYGEN SATURATION: 97 %

## 2019-02-06 PROCEDURE — 20553 NJX 1/MLT TRIGGER POINTS 3/>: CPT | Performed by: NURSE PRACTITIONER

## 2019-02-06 PROCEDURE — 20553 NJX 1/MLT TRIGGER POINTS 3/>: CPT

## 2019-02-06 PROCEDURE — 2500000003 HC RX 250 WO HCPCS

## 2019-02-06 RX ORDER — BUPIVACAINE HYDROCHLORIDE 5 MG/ML
INJECTION, SOLUTION EPIDURAL; INTRACAUDAL
Status: COMPLETED | OUTPATIENT
Start: 2019-02-06 | End: 2019-02-06

## 2019-02-06 RX ORDER — LIDOCAINE HYDROCHLORIDE 10 MG/ML
INJECTION, SOLUTION EPIDURAL; INFILTRATION; INTRACAUDAL; PERINEURAL
Status: COMPLETED | OUTPATIENT
Start: 2019-02-06 | End: 2019-02-06

## 2019-02-06 RX ADMIN — LIDOCAINE HYDROCHLORIDE 15 ML: 10 INJECTION, SOLUTION EPIDURAL; INFILTRATION; INTRACAUDAL; PERINEURAL at 15:50

## 2019-02-06 RX ADMIN — BUPIVACAINE HYDROCHLORIDE 15 ML: 5 INJECTION, SOLUTION EPIDURAL; INTRACAUDAL at 15:49

## 2019-02-06 ASSESSMENT — PAIN DESCRIPTION - PAIN TYPE: TYPE: CHRONIC PAIN

## 2019-02-06 ASSESSMENT — PAIN SCALES - GENERAL: PAINLEVEL_OUTOF10: 7

## 2019-02-11 ENCOUNTER — TELEPHONE (OUTPATIENT)
Dept: PAIN MANAGEMENT | Age: 49
End: 2019-02-11

## 2019-02-11 NOTE — TELEPHONE ENCOUNTER
Carmela Negrete was called on Friday, February 8th as a follow up from his thoracic and lumbar trigger point injections on Wednesday, February 6th. He reported that he is getting better each day. He does feel like Mimi Howard has reached the source of his pain. He reported that he has reported at least 60 % pain relief so far. He also stated that his left leg pain that goes down his leg to his knee is doing a little better.

## 2019-03-05 DIAGNOSIS — M51.36 DDD (DEGENERATIVE DISC DISEASE), LUMBAR: ICD-10-CM

## 2019-03-08 RX ORDER — OXYCODONE AND ACETAMINOPHEN 10; 325 MG/1; MG/1
1 TABLET ORAL EVERY 8 HOURS PRN
Qty: 90 TABLET | Refills: 0 | Status: SHIPPED | OUTPATIENT
Start: 2019-03-09 | End: 2019-04-01 | Stop reason: SDUPTHER

## 2019-03-20 ENCOUNTER — HOSPITAL ENCOUNTER (OUTPATIENT)
Dept: PAIN MANAGEMENT | Age: 49
Discharge: HOME | End: 2019-03-20
Payer: COMMERCIAL

## 2019-03-20 VITALS
OXYGEN SATURATION: 93 % | TEMPERATURE: 97.7 F | DIASTOLIC BLOOD PRESSURE: 78 MMHG | HEART RATE: 110 BPM | RESPIRATION RATE: 20 BRPM | SYSTOLIC BLOOD PRESSURE: 129 MMHG

## 2019-03-20 PROCEDURE — 2500000003 HC RX 250 WO HCPCS

## 2019-03-20 PROCEDURE — 20610 DRAIN/INJ JOINT/BURSA W/O US: CPT | Performed by: NURSE PRACTITIONER

## 2019-03-20 PROCEDURE — 6360000002 HC RX W HCPCS

## 2019-03-20 PROCEDURE — 20611 DRAIN/INJ JOINT/BURSA W/US: CPT

## 2019-03-20 PROCEDURE — 20553 NJX 1/MLT TRIGGER POINTS 3/>: CPT

## 2019-03-20 PROCEDURE — 20553 NJX 1/MLT TRIGGER POINTS 3/>: CPT | Performed by: NURSE PRACTITIONER

## 2019-03-20 RX ORDER — TRIAMCINOLONE ACETONIDE 40 MG/ML
INJECTION, SUSPENSION INTRA-ARTICULAR; INTRAMUSCULAR
Status: COMPLETED | OUTPATIENT
Start: 2019-03-20 | End: 2019-03-20

## 2019-03-20 RX ORDER — LIDOCAINE HYDROCHLORIDE 10 MG/ML
INJECTION, SOLUTION EPIDURAL; INFILTRATION; INTRACAUDAL; PERINEURAL
Status: COMPLETED | OUTPATIENT
Start: 2019-03-20 | End: 2019-03-20

## 2019-03-20 RX ORDER — BUPIVACAINE HYDROCHLORIDE 5 MG/ML
INJECTION, SOLUTION EPIDURAL; INTRACAUDAL
Status: COMPLETED | OUTPATIENT
Start: 2019-03-20 | End: 2019-03-20

## 2019-03-20 RX ADMIN — BUPIVACAINE HYDROCHLORIDE 11 ML: 5 INJECTION, SOLUTION EPIDURAL; INTRACAUDAL at 13:37

## 2019-03-20 RX ADMIN — LIDOCAINE HYDROCHLORIDE 10 ML: 10 INJECTION, SOLUTION EPIDURAL; INFILTRATION; INTRACAUDAL; PERINEURAL at 13:38

## 2019-03-20 RX ADMIN — TRIAMCINOLONE ACETONIDE 40 MG: 40 INJECTION, SUSPENSION INTRA-ARTICULAR; INTRAMUSCULAR at 13:38

## 2019-03-20 ASSESSMENT — PAIN DESCRIPTION - PAIN TYPE: TYPE: CHRONIC PAIN

## 2019-03-20 ASSESSMENT — PAIN SCALES - GENERAL: PAINLEVEL_OUTOF10: 3

## 2019-03-25 ENCOUNTER — TELEPHONE (OUTPATIENT)
Dept: PAIN MANAGEMENT | Age: 49
End: 2019-03-25

## 2019-03-25 NOTE — TELEPHONE ENCOUNTER
I called Tessa Lund as a follow up from his left shoulder injection and lumbar tps that he had on 3/20/19 with Sherine Luna. He did not answer his phone.

## 2019-04-01 DIAGNOSIS — M51.36 DDD (DEGENERATIVE DISC DISEASE), LUMBAR: ICD-10-CM

## 2019-04-02 RX ORDER — OXYCODONE AND ACETAMINOPHEN 10; 325 MG/1; MG/1
1 TABLET ORAL EVERY 8 HOURS PRN
Qty: 90 TABLET | Refills: 0 | Status: SHIPPED | OUTPATIENT
Start: 2019-04-08 | End: 2019-05-01 | Stop reason: SDUPTHER

## 2019-04-09 ENCOUNTER — HOSPITAL ENCOUNTER (OUTPATIENT)
Dept: PAIN MANAGEMENT | Age: 49
Discharge: HOME | End: 2019-04-09
Payer: COMMERCIAL

## 2019-04-09 VITALS
HEART RATE: 99 BPM | DIASTOLIC BLOOD PRESSURE: 82 MMHG | WEIGHT: 187 LBS | TEMPERATURE: 98.7 F | RESPIRATION RATE: 16 BRPM | HEIGHT: 70 IN | SYSTOLIC BLOOD PRESSURE: 142 MMHG | OXYGEN SATURATION: 96 % | BODY MASS INDEX: 26.77 KG/M2

## 2019-04-09 DIAGNOSIS — M79.18 MYOFASCIAL PAIN SYNDROME: ICD-10-CM

## 2019-04-09 DIAGNOSIS — M50.10 CERVICAL DISC DISORDER WITH RADICULOPATHY: ICD-10-CM

## 2019-04-09 PROBLEM — G89.29 CHRONIC LEFT-SIDED THORACIC BACK PAIN: Status: ACTIVE | Noted: 2019-01-29

## 2019-04-09 PROBLEM — M25.511 CHRONIC PAIN OF BOTH SHOULDERS: Status: ACTIVE | Noted: 2018-12-18

## 2019-04-09 PROCEDURE — 99213 OFFICE O/P EST LOW 20 MIN: CPT | Performed by: NURSE PRACTITIONER

## 2019-04-09 PROCEDURE — 99213 OFFICE O/P EST LOW 20 MIN: CPT

## 2019-04-09 RX ORDER — CYCLOBENZAPRINE HCL 10 MG
10 TABLET ORAL 2 TIMES DAILY PRN
Qty: 60 TABLET | Refills: 2 | Status: SHIPPED | OUTPATIENT
Start: 2019-04-09 | End: 2019-08-12 | Stop reason: SDUPTHER

## 2019-04-09 ASSESSMENT — PAIN DESCRIPTION - ORIENTATION: ORIENTATION: LOWER;RIGHT;LEFT

## 2019-04-09 ASSESSMENT — PAIN - FUNCTIONAL ASSESSMENT: PAIN_FUNCTIONAL_ASSESSMENT: PREVENTS OR INTERFERES SOME ACTIVE ACTIVITIES AND ADLS

## 2019-04-09 ASSESSMENT — ACTIVITIES OF DAILY LIVING (ADL): EFFECT OF PAIN ON DAILY ACTIVITIES: LIMITS ACTIVITIES

## 2019-04-09 ASSESSMENT — PAIN DESCRIPTION - PROGRESSION: CLINICAL_PROGRESSION: NOT CHANGED

## 2019-04-09 ASSESSMENT — ENCOUNTER SYMPTOMS
CONSTIPATION: 0
BACK PAIN: 1

## 2019-04-09 ASSESSMENT — PAIN DESCRIPTION - LOCATION: LOCATION: BACK;NECK;SHOULDER

## 2019-04-09 ASSESSMENT — PAIN SCALES - GENERAL: PAINLEVEL_OUTOF10: 7

## 2019-04-09 ASSESSMENT — PAIN DESCRIPTION - PAIN TYPE: TYPE: CHRONIC PAIN

## 2019-04-09 ASSESSMENT — PAIN DESCRIPTION - FREQUENCY: FREQUENCY: CONTINUOUS

## 2019-04-09 ASSESSMENT — PAIN DESCRIPTION - ONSET: ONSET: ON-GOING

## 2019-04-09 NOTE — PROGRESS NOTES
feeling better or if something else happened. Patient does not remember any injury but he has been busier at work and the cold weather outside causes an increase in pain.      Patient stopped gabapentin because his lower lip was twitching. Patient to see neurosurgeon later this week.      12/11/18  presents to the office for a procedure. Patient was scheduled for cervical trigger point injections and left shoulder injection. Patient is unable to have procedure today related to being on an antibiotic less than 1 week ago. Patient's appointment changed to an office visit due to complaints of numbness and twitching around his mouth.     Patient had surgery by Dr. Miriam Bear in March of 2017. At last appointment results were discussed with patient. Patient stopped taking medications that were prescribed due to \"dizziness\" Patient was to start back on his gabapentin but \"I have been working 70 hours a week and staying with my girlfriend. My medication is at home\"     11/12/2018  Ashely Ambriz is a 53 y.o. male who presents to the office for a follow up MRI cervical spine W/WO contrast, MRI lumbar spine WO Contrast, and nerve conduction study with EMG. Patient also presents to the office for a follow up medication effectiveness. Patient was started on elavil, neurontin, and robaxin at last office visit.      Patient stopped taking Elavil and gabapentin due to dizziness. Patient's stated Robaxin wasn't working went back on his Flexeril. He states that the Flexeril worked better with the myofascial pain.      Patient's main complaint is pain radiating from his neck into his shoulder. After further evaluation of the left shoulder patient has decreased range of motion.     9/12/2018  Ashely Ambriz is a 53 y.o. male who presents to the office following Cervical TP's on 7/18. Patient states that he had no relief from the TP's. Patient states that his pain is rapidly worsening.  Patient stated that the pain in his neck used to be localized in his shoulders and is not radiating across his shoulders with numbness and tingling to left hand but he is having NEW numbness and tingling in his left hand. Patient states his low back pain is worsening as well. He states lifting cause instant muscle spasms. Other aggrevating factors are sitting and bending. He states that he has difficulty with sexual intercourse due to pain. NEW associated symptoms is numbness and tingling down his left leg to the bottom of his feet and his little toes.     Current PE.3    Past PE.7     Annual Screens:    ORT Score: 0    PHQ-9 Score: 2    Current Pain Assessment  Pain Assessment  Pain Assessment: 0-10  Pain Level: 7  Patient's Stated Pain Goal: No pain  Pain Type: Chronic pain  Pain Location: Back, Neck, Shoulder  Pain Orientation: Lower, Right, Left  Pain Radiating Towards: neck pain down to shoulders; low back pain; shoulder pain  Pain Descriptors: Aching, Sharp, Constant, Stabbing  Pain Frequency: Continuous  Pain Onset: On-going  Clinical Progression: Not changed  Effect of Pain on Daily Activities: limits activities  Functional Pain Assessment: Prevents or interferes some active activities and ADLs  Non-Pharmaceutical Pain Intervention(s): Rest  Response to Pain Intervention: Patient Satisfied  Multiple Pain Sites: No      Previous Injury: No    Previous Physical Therapy In the last 6 months? Yes    Did Physical Therapy make the pain better? Yes     MRI in the two last year? Yes MRI Cervical and Lumbar spine 10/2018 see images below    CT scan in last 12 months? No    X-ray last 12 months? Yes thoracic spine xray 2019    Nerve Conduction Study / EMG Yes 10/2018    Injections in the past? Yes    Did the injections help relieve the pain?  Yes    Past Medical Histoy  Past Medical History:   Diagnosis Date    Arthritis     Chronic back pain     sciatica    COPD [facility]chronic obstructive pulmonary disease) ([facility] [address]     Neck pain     HX vertebral fx with hardware       Surgery History  Past Surgical History:   Procedure Laterality Date    ABDOMEN SURGERY Left 1/10/2019    EXCISION MASS GROIN performed by Walter Shah MD at [address] N/A 3/1/2017    ACDF C5-6, C6-7 WITH ALLOGRAFT BONE AND ANTERIOR CERVICAL PLATING  performed by Victor M Bear DO at [address] [facility] COLONOSCOPY N/A 4/10/2018    Dr Dianne CAMACHO (-) dysplasia x 1--5 yr recall    NECK SURGERY      TONSILLECTOMY AND ADENOIDECTOMY          Family History  family history includes Cancer in his father, maternal grandmother, and mother. Social History    Social History     Tobacco Use    Smoking status: Current Every Day Smoker     Packs/day: 1.50     Years: 30.00     Pack years: 45.00     Types: Cigarettes    Smokeless tobacco: Never Used   Substance Use Topics    Alcohol use: Yes     Comment: occasionally       Allergies  Pcn [penicillins]     Current Medications  Current Outpatient Medications   Medication Sig Dispense Refill    cyclobenzaprine (FLEXERIL) 10 MG tablet Take 1 tablet by mouth 2 times daily as needed for Muscle spasms 60 tablet 2    oxyCODONE-acetaminophen (PERCOCET)  MG per tablet Take 1 tablet by mouth every 8 hours as needed for Pain for up to 30 days. (may fill 4/8/19) 90 tablet 0     No current facility-administered medications for this encounter. Review of Systems:  Review of Systems   Constitutional: Negative for activity change. Gastrointestinal: Negative for constipation. Musculoskeletal: Positive for arthralgias, back pain, myalgias, neck pain and neck stiffness. Negative for joint swelling. Neurological: Positive for numbness. Negative for weakness. Psychiatric/Behavioral: Negative for agitation, self-injury and suicidal ideas. The patient is not nervous/anxious.         14 point ROS negative besides that noted in HPI    Physical Exam:    Vitals:    04/09/19 1410   BP: (!) 142/82   Pulse: 99   Resp: 16   Temp: 98.7 12/11/18 Compliant    Labs:  Lab Results   Component Value Date     02/06/2018    K 4.6 02/06/2018     02/06/2018    CO2 25 02/06/2018    BUN 8 02/06/2018    CREATININE 0.7 02/06/2018    GLUCOSE 95 02/06/2018    CALCIUM 9.6 02/06/2018        Lab Results   Component Value Date    WBC 11.7 (H) 02/06/2018    HGB 15.7 02/06/2018    HCT 48.8 02/06/2018    MCV 91.4 02/06/2018     02/06/2018       Recent Imaging:  Exam:   XR THORACIC SPINE (3 VIEWS)    Date:  1/31/2019   History:  Male, age  50 years;M54.6  COMPARISON:  None. Findings : There is preservation of the normal kyphosis of the thoracic spine. There is no evidence of acute compression deformity. Multilevel degenerative disc disease with disc space narrowing. Prior cervical fusion.     Impression  Impression:  1.  No acute osseous pathology. 2.  Multilevel degenerative disc disease with disc space narrowing. Signed by Dr Alex Sheffield on 1/31/2019 9:29 AM      EXAMINATION: [facility]  10/4/2018 2:29 PM  HISTORY: Cervical disc disorder with radiculopathy. Chronic neck pain,  bilateral shoulder pain  COMPARISON: Cervical spine radiographs dated 9/12/2017  TECHNIQUE: Multiplanar MR imaging is obtained as well as gadolinium  enhanced imaging  FINDINGS:   Artifact related to the fixation screw involving the C2 vertebral body  and dens region is observed. Artifact associated with anterior and  interbody fusion C5-C6 and C6-C7 also observed. The bone marrow signal is otherwise normal. There is no evidence of  bone marrow edema or a pathologic marrow replacement process. There are no cord signal abnormalities. DISC SPACE LEVELS:  C2-C3: The disc space is poorly visualized due to metallic artifact. The bony spinal canal is widely patent however without stenosis. There  is no focal disc protrusions or herniations identified. There is  uncinate hypertrophy with mild right-sided foraminal narrowing.   C3-C4: The disc space is maintained. There is minimal posterior  osteophyte disc complex without canal stenosis. Minimal central left  paracentral protrusion questioned. The foramina are widely patent. C4-C5: The disc space is maintained without focal protrusions or  herniations with minimal posterior ossified disc complex. There is no  canal or foraminal stenosis. C5-C6: Bony spinal canal is widely patent. There is no foraminal  narrowing. C6-C7: The bony spinal canal is capacious without canal stenosis. There is mild uncinate hypertrophy with relative mild foraminal  narrowing without foraminal stenosis. C7-T1: There is broad-based posterior osteophyte and disc complex. There is mild ventral thecal sac and cord flattening without  significant canal stenosis. Uncinate hypertrophy result in mild to  moderate bilateral foraminal narrowing greater on the left.     Impression   IMPRESSION:   1. Postsurgical changes with associated artifact. 2. Mild spondylosis changes as described above in detail. Signed by Dr Jaciel Barbour on 10/4/2018 2:47 PM      Examination. MRI LUMBAR SPINE WO CONTRAST  History: Chronic low back pain radiating into the left leg. No history  for trauma or previous surgery. The multiplanar, multisequence MR imaging of the lumbar spine is  performed without intravenous contrast enhancement. The comparison is made with the previous study dated 9/22/2017. There is normal curve and alignment. The vertebral body heights are  normal. The bone marrow signal of the vertebral bodies and the  posterior elements are normal.  The loss of height and signal of the intervertebral disks at multiple  levels, more pronounced at L2-3 representing degeneration/desiccation  of the discs. L1, space L1-2. There is mild disc bulging. There is bilateral facet  arthropathy. The neural foramina spinal canal are patent. L2, space L2-3. There is moderate diffuse disc bulging. There is  bilateral facet arthropathy.  The foramina and spinal canal are patent. L3, space L3-4. The disc spaces normal. No disc bulging or herniation. There is moderate facet arthropathy. The neural foramina spinal canal  are patent. L4, space L4-5. There is a left lateral annular tear with disc  herniation into the left neural foraminal. This is unchanged in the  previous study. There is marked narrowing of the left neural foramina. No spinal stenosis. The right neural foramen is normal.  L5, space L5-S1. There is mild disc bulging. There is bilateral facet  arthropathy. The neural foramina and spinal canal are patent. The size and signal of the conus medullaris is normal. It terminates  opposite space L1-2. No intrinsic abnormality. No focal enlargement or  expansion. The visualized cauda equina appear normal.     Impression  Lumbar spondylosis. A left lateral annular tear of disc at L4-5 with herniation of the  disc into the left lateral diaphragm and and severe narrowing of the  neural foramina. Signed by Dr Elver Guido on 10/4/2018 1:48 PM      Assessment:  Principal Problem:    Osteoarthritis of spine with radiculopathy, cervical region  Active Problems:    Chronic back pain    Chronic neck pain    Cervical disc disorder with radiculopathy    Lumbar facet arthropathy    Sacroiliitis (HCC)    Myofascial pain    CTS (carpal tunnel syndrome)    Chronic pain of both shoulders    Chronic left-sided thoracic back pain  Resolved Problems:    * No resolved hospital problems. *      Plan: 1. ReSchedule left shoulder injection with ultrasound and Repeat Lumbar TP's with shoulder injection. 2. Continue Flexeril refill sent  3. Trileptal for neuropathic pain did not help stop medication  4. Route pain medication to Dr. Jayne Chavez signed yesterday  5. Consider right shoulder injection in the future. Discussion: Discussed exam findings and plan of care with patient. Patient agreed with POC and questions were asked and answered.      Activity: discussed exercise as beneficial to pain reduction, encouraged stretching exercisewith a focus on torso strengthening. Education Provided by Provider:  Review of [employer] Public / Agreement Review / ORT Calculated / PHQ-9 Reviewed / Compliance Issues Discussed / Cognitive Behavior Needs / Exercise / Review of Test / Financial Issues / Teaching / Smoking Cessation / Tobacco/Alcohol Use    Controlled Substance Monitoring:   Discussed with patient possible medication side effects, risk of tolerance, dependenceand alternative treatments. Discussed the growing epidemic in the [place] with the overprescribing and at times the abuse of narcotics. Discussed the detrimental effects of long term narcotic use in younger patients. Patientencouraged to set daily goals of exercising and if on narcotics decreasing daily narcotic intake. Discussed with the patient about the development of hyperalgesia with long term narcotic intake. CC:  NOEMI Herring APRN - CNP, 4/9/2019 at 2:52 PM    EMR dragon/transcription disclaimer: Much of this encounter note is electronic transcription/translation of spoken language to printed tach. Electronic translation of spoken language may be erroneous, or at times, nonsensical words or phrases may be inadvertently transcribed.  Although, I have reviewed the note for such errors, some may still exist.    Education Provided by Nurse  Review of Jose Public / Agreement Review / ORT Calculated / PHQ-9 Reviewed / Compliance Issues Discussed / Cognitive Behavior Needs / Exercise / Review of Test / Financial Issues / Teaching / Smoking Cessation / Tobacco/Alcohol Use   Electronically signed by Rik Felipe RN on 4/9/19 at 2:47 PM      Providers Plan   Outgoing Referral / Pharmacy Consult / Test ordered / Obtained Test Results / Consults    New/HP Patient Picture Obtained / Prescriptions ordered 1

## 2019-05-01 DIAGNOSIS — M51.36 DDD (DEGENERATIVE DISC DISEASE), LUMBAR: ICD-10-CM

## 2019-05-03 RX ORDER — OXYCODONE AND ACETAMINOPHEN 10; 325 MG/1; MG/1
1 TABLET ORAL EVERY 8 HOURS PRN
Qty: 90 TABLET | Refills: 0 | Status: SHIPPED | OUTPATIENT
Start: 2019-05-08 | End: 2019-06-03 | Stop reason: SDUPTHER

## 2019-05-08 ENCOUNTER — HOSPITAL ENCOUNTER (OUTPATIENT)
Dept: PAIN MANAGEMENT | Age: 49
Discharge: HOME | End: 2019-05-08
Payer: COMMERCIAL

## 2019-05-08 VITALS
TEMPERATURE: 97.5 F | SYSTOLIC BLOOD PRESSURE: 147 MMHG | HEART RATE: 107 BPM | RESPIRATION RATE: 20 BRPM | OXYGEN SATURATION: 97 % | DIASTOLIC BLOOD PRESSURE: 89 MMHG

## 2019-05-08 PROCEDURE — 20553 NJX 1/MLT TRIGGER POINTS 3/>: CPT | Performed by: NURSE PRACTITIONER

## 2019-05-08 PROCEDURE — 2500000003 HC RX 250 WO HCPCS

## 2019-05-08 PROCEDURE — 20553 NJX 1/MLT TRIGGER POINTS 3/>: CPT

## 2019-05-08 RX ORDER — LIDOCAINE HYDROCHLORIDE 10 MG/ML
INJECTION, SOLUTION EPIDURAL; INFILTRATION; INTRACAUDAL; PERINEURAL
Status: COMPLETED | OUTPATIENT
Start: 2019-05-08 | End: 2019-05-08

## 2019-05-08 RX ORDER — BUPIVACAINE HYDROCHLORIDE 5 MG/ML
INJECTION, SOLUTION EPIDURAL; INTRACAUDAL
Status: COMPLETED | OUTPATIENT
Start: 2019-05-08 | End: 2019-05-08

## 2019-05-08 RX ADMIN — BUPIVACAINE HYDROCHLORIDE 10 ML: 5 INJECTION, SOLUTION EPIDURAL; INTRACAUDAL at 14:20

## 2019-05-08 RX ADMIN — LIDOCAINE HYDROCHLORIDE 10 ML: 10 INJECTION, SOLUTION EPIDURAL; INFILTRATION; INTRACAUDAL; PERINEURAL at 14:20

## 2019-05-08 ASSESSMENT — PAIN DESCRIPTION - PAIN TYPE: TYPE: CHRONIC PAIN

## 2019-05-08 ASSESSMENT — PAIN SCALES - GENERAL: PAINLEVEL_OUTOF10: 6

## 2019-05-08 NOTE — PROGRESS NOTES
Procedure:  Level of Consciousness: [x]Alert [x]Oriented []Disoriented []Lethargic  Anxiety Level: [x]Calm []Anxious []Depressed []Other  Skin: [x]Warm [x]Dry []Cool []Moist []Intact []Other  Cardiovascular: [x]Palpitations: [x]Never []Occasionally []Frequently  Chest Pain: []No []Yes  Respiratory:  [x]Unlabored []Labored []Cough ([] Productive []Unproductive)  HCG Required: [x]No []Yes   Results: []Negative []Positive  Knowledge Level:        [x]Patient/Other verbalized understanding of pre-procedure instructions. [x]Assessment of post-op care needs (transportation, responsible caregiver)        [x]Able to discuss health care problems and how to deal with it.   Factors that Affect Teaching:        Language Barrier: [x]No []Yes - why:        Hearing Loss:        [x]No []Yes            Corrective Device:  []Yes [x]No        Vision Loss:           []No [x]Yes            Corrective Device:  [x]Yes []No        Memory Loss:       [x]No []Yes            []Short Term []Long Term  Motivational Level:  [x]Asks Questions                  []Extremely Anxious       [x]Seems Interested               []Seems Uninterested                  []Denies need for Education  Risk for Injury:  [x]Patient oriented to person, place and time  []History of frequent falls/loss of balance  Nutritional:  []Change in appetite   []Weight Gain   []Weight Loss  Functional:  []Requires assistance with ADL's

## 2019-05-08 NOTE — PROCEDURES
[facility] Physical & Pain Medicine    Patient Name: Prabhakar Zaldivar    : 1970                    Age: 53 y.o. Sex: male    Date: May 8, 2019    Pre-op Diagnosis: Myofascial Pain/ Muscle Spasms    Post-op Diagnosis: Myofascial Pain/ Muscle Spasms    Procedure: Lumbar Trigger Point Injections    Performing Procedure: STEPH Hollis - BC; [facility]    Previously Had Procedure:  [x] Yes   [] No    Patient Vitals for the past 24 hrs:   BP Temp Temp src Pulse Resp SpO2   19 1357 (!) 147/89 97.5 °F (36.4 °C) Temporal 107 20 97 %       Description of Procedure:    After a brief physical assessment and failure to improve with conservative measures the patient presented for Lumbar Trigger Point Injections The indications, limitations and possible complications were discussed with the patient and the patient elected to proceed with the procedure. After voluntary, informed and signed consent obtained the patient was placed in a  [x] Sitting  [] Prone position     Appropriate time out was obtained per policy. The area of maximal tenderness was palpated over the  [] Right  [] Left  [x] Bilateral Lower  [x] Latissimus  [x] Erector Spinae  [x] Lumbar Paraspinous. The skin overlying these areas was marked with a skin marker. The skin overlying the proposed injection sites were then sprayed with Gebauer's Solution while protecting patient eyes. [] yes  [x] no    The area was prepped in a sterile fashion with Chloro-Prep. Each trigger point of the  [] Right  [] Left  [x] Bilateral Lower  [x] Latissimus   [x] Erector Spinae  [x] Lumbar Paraspinous was then injected after negative aspiration was injected with approximately 2 ml of a solution of 5 ml of 1% Lidocaine Plain and 5 ml of 0.5% Marcaine Plain    [] with Decadron 0.5 ml (10mg/ml)  [] with Toradol 0.5 ml (15mg/ml)  (approximately 20 ml total) using a 25 gauge 1 1/2 inch needle. Sterile dressings applied. Discharge:   The patient tolerated the procedure well. There were no complications during the procedure and the patient was discharged home with discharge instructions. The patient has been instructed to contact the office should there be any complications or questions to arise between today and their next appointment.     Plan:    Procedure in 6 weeks and office visit in 8 - 9 weeks  NOEMI Rivera CNP, 5/8/2019 at 3:03 PM

## 2019-05-21 ENCOUNTER — OFFICE VISIT (OUTPATIENT)
Dept: FAMILY MEDICINE CLINIC | Age: 49
End: 2019-05-21
Payer: COMMERCIAL

## 2019-05-21 VITALS
SYSTOLIC BLOOD PRESSURE: 132 MMHG | RESPIRATION RATE: 24 BRPM | HEART RATE: 97 BPM | OXYGEN SATURATION: 99 % | DIASTOLIC BLOOD PRESSURE: 86 MMHG | BODY MASS INDEX: 25.25 KG/M2 | TEMPERATURE: 98 F | WEIGHT: 176 LBS

## 2019-05-21 DIAGNOSIS — J21.9 ACUTE BRONCHIOLITIS DUE TO UNSPECIFIED ORGANISM: Primary | ICD-10-CM

## 2019-05-21 PROCEDURE — 99213 OFFICE O/P EST LOW 20 MIN: CPT | Performed by: NURSE PRACTITIONER

## 2019-05-21 PROCEDURE — 96372 THER/PROPH/DIAG INJ SC/IM: CPT | Performed by: NURSE PRACTITIONER

## 2019-05-21 RX ORDER — DEXAMETHASONE SODIUM PHOSPHATE 10 MG/ML
4 INJECTION INTRAMUSCULAR; INTRAVENOUS ONCE
Status: COMPLETED | OUTPATIENT
Start: 2019-05-21 | End: 2019-05-21

## 2019-05-21 RX ORDER — TRIAMCINOLONE ACETONIDE 40 MG/ML
40 INJECTION, SUSPENSION INTRA-ARTICULAR; INTRAMUSCULAR ONCE
Status: COMPLETED | OUTPATIENT
Start: 2019-05-21 | End: 2019-05-21

## 2019-05-21 RX ORDER — AZITHROMYCIN 250 MG/1
250 TABLET, FILM COATED ORAL SEE ADMIN INSTRUCTIONS
Qty: 6 TABLET | Refills: 0 | Status: SHIPPED | OUTPATIENT
Start: 2019-05-21 | End: 2019-05-26

## 2019-05-21 RX ORDER — ACETAMINOPHEN 325 MG/1
650 TABLET ORAL EVERY 6 HOURS PRN
COMMUNITY

## 2019-05-21 RX ADMIN — DEXAMETHASONE SODIUM PHOSPHATE 4 MG: 10 INJECTION INTRAMUSCULAR; INTRAVENOUS at 11:13

## 2019-05-21 RX ADMIN — TRIAMCINOLONE ACETONIDE 40 MG: 40 INJECTION, SUSPENSION INTRA-ARTICULAR; INTRAMUSCULAR at 11:13

## 2019-05-21 ASSESSMENT — ENCOUNTER SYMPTOMS
SHORTNESS OF BREATH: 0
SORE THROAT: 0
WHEEZING: 0
COUGH: 1

## 2019-05-21 ASSESSMENT — PATIENT HEALTH QUESTIONNAIRE - PHQ9
SUM OF ALL RESPONSES TO PHQ QUESTIONS 1-9: 0
1. LITTLE INTEREST OR PLEASURE IN DOING THINGS: 0
2. FEELING DOWN, DEPRESSED OR HOPELESS: 0
SUM OF ALL RESPONSES TO PHQ9 QUESTIONS 1 & 2: 0
SUM OF ALL RESPONSES TO PHQ QUESTIONS 1-9: 0

## 2019-05-21 NOTE — PROGRESS NOTES
SUBJECTIVE:  Here today for congestion and fever  Patient ID: Yasir Ozuna is a 53 y.o. male. HPI:   Cough   This is a new problem. The current episode started in the past 7 days. The problem has been gradually worsening. The problem occurs every few minutes. The cough is productive of sputum. Associated symptoms include a fever, myalgias and nasal congestion. Pertinent negatives include no sore throat, shortness of breath or wheezing. Treatments tried: tylenol,  The treatment provided no relief. Fever    Associated symptoms include coughing. Pertinent negatives include no sore throat or wheezing. Past Medical History:   Diagnosis Date    Arthritis     Chronic back pain     sciatica    COPD (chronic obstructive pulmonary disease) ([facility])     Neck pain     HX vertebral fx with hardware      Prior to Visit Medications    Medication Sig Taking? Authorizing Provider   acetaminophen (TYLENOL) 325 MG tablet Take 650 mg by mouth every 6 hours as needed for Pain Yes Historical Provider, MD   azithromycin (ZITHROMAX) 250 MG tablet Take 1 tablet by mouth See Admin Instructions for 5 days 500mg on day 1 followed by 250mg on days 2 - 5 Yes Marylen Anger, APRN   oxyCODONE-acetaminophen (PERCOCET)  MG per tablet Take 1 tablet by mouth every 8 hours as needed for Pain for up to 30 days. (may fill 5/8/19) Yes Aniket Bishop   cyclobenzaprine (FLEXERIL) 10 MG tablet Take 1 tablet by mouth 2 times daily as needed for Muscle spasms Yes NOEMI Buenrostro - CNP     Allergies   Allergen Reactions    Pcn [Penicillins]        Review of Systems   Constitutional: Positive for fever. HENT: Negative for sore throat. Respiratory: Positive for cough. Negative for shortness of breath and wheezing. Musculoskeletal: Positive for myalgias. OBJECTIVE:    Physical Exam   Constitutional: He is oriented to person, place, and time. He appears well-developed and well-nourished.    HENT:   Head: Normocephalic and atraumatic. Right Ear: External ear normal. No tenderness. Tympanic membrane is not injected and not bulging. Left Ear: External ear normal. No tenderness. Tympanic membrane is injected. Tympanic membrane is not bulging. A middle ear effusion is present. Nose: Nose normal. No rhinorrhea. Mouth/Throat: Posterior oropharyngeal erythema present. No posterior oropharyngeal edema or tonsillar abscesses. Eyes: Conjunctivae are normal.   Neck: Normal range of motion. Neck supple. Cardiovascular: Normal rate, regular rhythm and normal heart sounds. Pulmonary/Chest: Effort normal and breath sounds normal.   Abdominal: Soft. Bowel sounds are normal.   Lymphadenopathy:     He has no cervical adenopathy. Neurological: He is alert and oriented to person, place, and time. Skin: Skin is warm and dry. Psychiatric: He has a normal mood and affect. /86 (Site: Left Upper Arm, Position: Sitting, Cuff Size: Medium Adult)   Pulse 97   Temp 98 °F (36.7 °C) (Oral)   Resp 24   Wt 176 lb (79.8 kg)   SpO2 99%   BMI 25.25 kg/m²      ASSESSMENT:      ICD-10-CM    1. Acute bronchiolitis due to unspecified organism J21.9 azithromycin (ZITHROMAX) 250 MG tablet     dexamethasone (DECADRON) injection 4 mg     triamcinolone acetonide (KENALOG-40) injection 40 mg       PLAN:    Mariia Cavanaugh: Cough (coughing with fever and congestion ); Fever (fever , drainage); and Congestion (cough )      Tylenol for fever  Push fluids. RTC for no improvement.

## 2019-06-03 DIAGNOSIS — M51.36 DDD (DEGENERATIVE DISC DISEASE), LUMBAR: ICD-10-CM

## 2019-06-04 RX ORDER — OXYCODONE AND ACETAMINOPHEN 10; 325 MG/1; MG/1
1 TABLET ORAL EVERY 8 HOURS PRN
Qty: 90 TABLET | Refills: 0 | Status: SHIPPED | OUTPATIENT
Start: 2019-06-07 | End: 2019-07-05 | Stop reason: SDUPTHER

## 2019-06-26 ENCOUNTER — HOSPITAL ENCOUNTER (OUTPATIENT)
Dept: PAIN MANAGEMENT | Age: 49
Discharge: HOME | End: 2019-06-26
Payer: COMMERCIAL

## 2019-06-26 VITALS
HEART RATE: 106 BPM | SYSTOLIC BLOOD PRESSURE: 119 MMHG | DIASTOLIC BLOOD PRESSURE: 79 MMHG | OXYGEN SATURATION: 95 % | RESPIRATION RATE: 18 BRPM | TEMPERATURE: 98 F

## 2019-06-26 DIAGNOSIS — M25.512 CHRONIC PAIN OF BOTH SHOULDERS: Primary | ICD-10-CM

## 2019-06-26 DIAGNOSIS — M25.511 CHRONIC PAIN OF BOTH SHOULDERS: Primary | ICD-10-CM

## 2019-06-26 DIAGNOSIS — M50.10 CERVICAL DISC DISORDER WITH RADICULOPATHY: ICD-10-CM

## 2019-06-26 DIAGNOSIS — G89.29 CHRONIC PAIN OF BOTH SHOULDERS: Primary | ICD-10-CM

## 2019-06-26 PROCEDURE — 20611 DRAIN/INJ JOINT/BURSA W/US: CPT | Performed by: NURSE PRACTITIONER

## 2019-06-26 PROCEDURE — 20611 DRAIN/INJ JOINT/BURSA W/US: CPT

## 2019-06-26 PROCEDURE — 2500000003 HC RX 250 WO HCPCS

## 2019-06-26 PROCEDURE — 6360000002 HC RX W HCPCS

## 2019-06-26 RX ORDER — BUPIVACAINE HYDROCHLORIDE 5 MG/ML
1 INJECTION, SOLUTION EPIDURAL; INTRACAUDAL ONCE
Status: DISCONTINUED | OUTPATIENT
Start: 2019-06-26 | End: 2019-06-28 | Stop reason: HOSPADM

## 2019-06-26 RX ORDER — TRIAMCINOLONE ACETONIDE 40 MG/ML
40 INJECTION, SUSPENSION INTRA-ARTICULAR; INTRAMUSCULAR ONCE
Status: DISCONTINUED | OUTPATIENT
Start: 2019-06-26 | End: 2019-06-28 | Stop reason: HOSPADM

## 2019-06-26 NOTE — PROCEDURES
[facility]    Patient Name: Nallely Cha    : 1970    Age: 51 y.o. Sex: male    Date: 2019    Preop Diagnosis: Osteoarthritis of  left Bilateral Shoulder(s)    Postop Diagnosis: Osteoarthritis of left Bilateral Shoulder(s)    Procedure: Steroid Injection of left  Shoulder(s)    Performing Procedure:  STEPH Rogers-BC, APRN    Patient Vitals for the past 24 hrs:   BP Temp Temp src Pulse Resp SpO2   19 1308 119/79 98 °F (36.7 °C) Temporal 106 18 95 %       Description of Procedure:  After a brief physical assessment and failure to improve with conservative measures the patient presented for an injection of the left Shoulder Joint(s). The indications, limitations and possible complications were discussed with the patient and the patient elected to proceed with the procedure. left Joint    After voluntary, informed and signed consent obtained the patient was placed in a seated position. Appropriate time out was obtained per policy. The patient was placed in a seated position with affected arm placed to the side and elbow flexed at 90 degrees. The Shoulder joint(s) was palpated for maximal tenderness. The area was marked for Posterior approach. The ultrasound was used to confirm correct location. The skin prepped in an aseptic fashion with CHG swab and then sprayed with Gebauer's Solution. Under aseptic technique 22 gauge 1 1/2 inch needle was then introduced into the shoulder(s). After a negative aspiration a solution of 1 ml of 0.5% Marcaine Plain, 1 ml of 1% Lidocaine Plain and 1 ml of Kenalog (40mg/ml) was injected into the Shoulder(s). The needle was withdrawn and a sterile dressing applied. If this was a bilateral procedure the same steps were followed on the opposite side. Discharge: The patient tolerated the procedure well.  There were no complications during the procedure and the patient was discharged home with discharge

## 2019-06-28 ENCOUNTER — TELEPHONE (OUTPATIENT)
Dept: PAIN MANAGEMENT | Age: 49
End: 2019-06-28

## 2019-06-28 DIAGNOSIS — M50.10 CERVICAL DISC DISORDER WITH RADICULOPATHY: ICD-10-CM

## 2019-06-28 NOTE — TELEPHONE ENCOUNTER
Spoke to pt concerning his injection he had on 06/26. Pt states he is still hurting quite a bit, but his main pain is in his low back. He states his pain score today is 7/10. He does have a follow up scheduled.

## 2019-07-05 DIAGNOSIS — M51.36 DDD (DEGENERATIVE DISC DISEASE), LUMBAR: ICD-10-CM

## 2019-07-08 RX ORDER — OXYCODONE AND ACETAMINOPHEN 10; 325 MG/1; MG/1
1 TABLET ORAL EVERY 8 HOURS PRN
Qty: 90 TABLET | Refills: 0 | Status: SHIPPED | OUTPATIENT
Start: 2019-07-08 | End: 2019-08-01 | Stop reason: SDUPTHER

## 2019-08-01 DIAGNOSIS — M51.36 DDD (DEGENERATIVE DISC DISEASE), LUMBAR: ICD-10-CM

## 2019-08-02 RX ORDER — OXYCODONE AND ACETAMINOPHEN 10; 325 MG/1; MG/1
1 TABLET ORAL EVERY 8 HOURS PRN
Qty: 90 TABLET | Refills: 0 | Status: SHIPPED | OUTPATIENT
Start: 2019-08-07 | End: 2019-09-03 | Stop reason: SDUPTHER

## 2019-08-12 ENCOUNTER — HOSPITAL ENCOUNTER (OUTPATIENT)
Dept: PAIN MANAGEMENT | Age: 49
Discharge: HOME | End: 2019-08-12
Payer: COMMERCIAL

## 2019-08-12 VITALS
TEMPERATURE: 98.4 F | BODY MASS INDEX: 24.22 KG/M2 | WEIGHT: 173 LBS | RESPIRATION RATE: 18 BRPM | OXYGEN SATURATION: 99 % | DIASTOLIC BLOOD PRESSURE: 96 MMHG | HEART RATE: 88 BPM | HEIGHT: 71 IN | SYSTOLIC BLOOD PRESSURE: 145 MMHG

## 2019-08-12 DIAGNOSIS — M79.18 MYOFASCIAL PAIN SYNDROME: ICD-10-CM

## 2019-08-12 DIAGNOSIS — M50.10 CERVICAL DISC DISORDER WITH RADICULOPATHY: ICD-10-CM

## 2019-08-12 PROCEDURE — 99214 OFFICE O/P EST MOD 30 MIN: CPT | Performed by: NURSE PRACTITIONER

## 2019-08-12 PROCEDURE — 99215 OFFICE O/P EST HI 40 MIN: CPT

## 2019-08-12 RX ORDER — CYCLOBENZAPRINE HCL 10 MG
10 TABLET ORAL 2 TIMES DAILY PRN
Qty: 60 TABLET | Refills: 2 | Status: SHIPPED | OUTPATIENT
Start: 2019-08-12 | End: 2020-11-13 | Stop reason: SDUPTHER

## 2019-08-12 ASSESSMENT — ENCOUNTER SYMPTOMS
CONSTIPATION: 0
BACK PAIN: 1

## 2019-08-12 ASSESSMENT — PAIN DESCRIPTION - FREQUENCY: FREQUENCY: CONTINUOUS

## 2019-08-12 ASSESSMENT — ACTIVITIES OF DAILY LIVING (ADL): EFFECT OF PAIN ON DAILY ACTIVITIES: LIMITS ACTIVITY

## 2019-08-12 ASSESSMENT — PAIN SCALES - GENERAL: PAINLEVEL_OUTOF10: 6

## 2019-08-12 ASSESSMENT — PAIN DESCRIPTION - PROGRESSION: CLINICAL_PROGRESSION: NOT CHANGED

## 2019-08-12 ASSESSMENT — PAIN DESCRIPTION - PAIN TYPE: TYPE: CHRONIC PAIN

## 2019-08-12 ASSESSMENT — PAIN DESCRIPTION - LOCATION: LOCATION: BACK;NECK;SHOULDER

## 2019-08-12 ASSESSMENT — PAIN DESCRIPTION - ORIENTATION: ORIENTATION: LOWER;UPPER;LEFT;RIGHT

## 2019-08-12 ASSESSMENT — PAIN DESCRIPTION - DIRECTION: RADIATING_TOWARDS: INTO BILATERAL SHOULDERS

## 2019-08-12 ASSESSMENT — PAIN DESCRIPTION - DESCRIPTORS: DESCRIPTORS: ACHING;CONSTANT;SHARP;STABBING

## 2019-08-12 ASSESSMENT — PAIN DESCRIPTION - ONSET: ONSET: ON-GOING

## 2019-08-12 ASSESSMENT — PAIN - FUNCTIONAL ASSESSMENT: PAIN_FUNCTIONAL_ASSESSMENT: PREVENTS OR INTERFERES SOME ACTIVE ACTIVITIES AND ADLS

## 2019-08-12 NOTE — H&P
Assessment  Pain Assessment  Pain Assessment: 0-10  Pain Level: 6  Patient's Stated Pain Goal: No pain  Pain Type: Chronic pain  Pain Location: Back, Neck, Shoulder  Pain Orientation: Lower, Upper, Left, Right  Pain Radiating Towards: into bilateral shoulders  Pain Descriptors: Aching, Constant, Sharp, Stabbing  Pain Frequency: Continuous  Pain Onset: On-going  Clinical Progression: Not changed  Effect of Pain on Daily Activities: limits activity  Functional Pain Assessment: Prevents or interferes some active activities and ADLs  Non-Pharmaceutical Pain Intervention(s): Rest, Repositioned  Multiple Pain Sites: No  POSS Score (Patient Ctrl Analgesia): 1    Employment:     Past Medical History  Past Medical History:   Diagnosis Date    Arthritis     Chronic back pain     sciatica    COPD (chronic obstructive pulmonary disease) (HCC)     Neck pain     HX vertebral fx with hardware       Medications  Current Outpatient Medications   Medication Sig Dispense Refill    oxyCODONE-acetaminophen (PERCOCET)  MG per tablet Take 1 tablet by mouth every 8 hours as needed for Pain for up to 30 days. (may fill 8/7/19) 90 tablet 0    acetaminophen (TYLENOL) 325 MG tablet Take 650 mg by mouth every 6 hours as needed for Pain      cyclobenzaprine (FLEXERIL) 10 MG tablet Take 1 tablet by mouth 2 times daily as needed for Muscle spasms 60 tablet 2     No current facility-administered medications for this encounter. Allergies  Pcn [penicillins]    Current Medications  Current Outpatient Medications   Medication Sig Dispense Refill    oxyCODONE-acetaminophen (PERCOCET)  MG per tablet Take 1 tablet by mouth every 8 hours as needed for Pain for up to 30 days.  (may fill 8/7/19) 90 tablet 0    acetaminophen (TYLENOL) 325 MG tablet Take 650 mg by mouth every 6 hours as needed for Pain      cyclobenzaprine (FLEXERIL) 10 MG tablet Take 1 tablet by mouth 2 times daily as needed for Muscle spasms 60 tablet 2     No

## 2019-08-19 ENCOUNTER — OFFICE VISIT (OUTPATIENT)
Dept: FAMILY MEDICINE CLINIC | Age: 49
End: 2019-08-19
Payer: COMMERCIAL

## 2019-08-19 ENCOUNTER — HOSPITAL ENCOUNTER (OUTPATIENT)
Dept: GENERAL RADIOLOGY | Age: 49
Discharge: HOME | End: 2019-08-19
Payer: COMMERCIAL

## 2019-08-19 VITALS
RESPIRATION RATE: 20 BRPM | OXYGEN SATURATION: 99 % | SYSTOLIC BLOOD PRESSURE: 142 MMHG | HEART RATE: 112 BPM | TEMPERATURE: 98.4 F | BODY MASS INDEX: 23.85 KG/M2 | WEIGHT: 171 LBS | DIASTOLIC BLOOD PRESSURE: 90 MMHG

## 2019-08-19 DIAGNOSIS — R50.9 FEVER, UNSPECIFIED: ICD-10-CM

## 2019-08-19 DIAGNOSIS — J40 BRONCHITIS: Primary | ICD-10-CM

## 2019-08-19 DIAGNOSIS — J40 BRONCHITIS: ICD-10-CM

## 2019-08-19 PROCEDURE — 99213 OFFICE O/P EST LOW 20 MIN: CPT | Performed by: NURSE PRACTITIONER

## 2019-08-19 PROCEDURE — 71046 X-RAY EXAM CHEST 2 VIEWS: CPT

## 2019-08-19 RX ORDER — DEXTROMETHORPHAN HYDROBROMIDE AND PROMETHAZINE HYDROCHLORIDE 15; 6.25 MG/5ML; MG/5ML
5 SYRUP ORAL 4 TIMES DAILY PRN
Qty: 160 ML | Refills: 0 | Status: SHIPPED | OUTPATIENT
Start: 2019-08-19 | End: 2019-08-26

## 2019-08-19 RX ORDER — METHYLPREDNISOLONE 4 MG/1
TABLET ORAL
Qty: 1 KIT | Refills: 0 | Status: SHIPPED | OUTPATIENT
Start: 2019-08-19 | End: 2020-01-15 | Stop reason: ALTCHOICE

## 2019-08-19 RX ORDER — DOXYCYCLINE HYCLATE 100 MG
100 TABLET ORAL 2 TIMES DAILY
Qty: 20 TABLET | Refills: 0 | Status: SHIPPED | OUTPATIENT
Start: 2019-08-19 | End: 2019-08-29

## 2019-08-19 ASSESSMENT — ENCOUNTER SYMPTOMS
VOMITING: 0
SORE THROAT: 1
COUGH: 1

## 2019-08-19 NOTE — PROGRESS NOTES
Respiratory: Positive for cough. Gastrointestinal: Negative for vomiting. OBJECTIVE:    Physical Exam   Constitutional: He is oriented to person, place, and time. He appears well-developed and well-nourished. No distress. HENT:   Head: Normocephalic and atraumatic. Right Ear: Tympanic membrane, external ear and ear canal normal.   Left Ear: Tympanic membrane, external ear and ear canal normal.   Nose: Nose normal. Right sinus exhibits no maxillary sinus tenderness and no frontal sinus tenderness. Left sinus exhibits no maxillary sinus tenderness and no frontal sinus tenderness. Mouth/Throat: Uvula is midline, oropharynx is clear and moist and mucous membranes are normal. No oropharyngeal exudate, posterior oropharyngeal edema or posterior oropharyngeal erythema. Eyes: Pupils are equal, round, and reactive to light. Conjunctivae and EOM are normal.   Neck: Normal range of motion. Neck supple. No tracheal deviation present. Cardiovascular: Normal rate, regular rhythm and normal heart sounds. Pulmonary/Chest: Effort normal and breath sounds normal. No stridor. No respiratory distress. He has no wheezes. Coarse cough   Lymphadenopathy:     He has no cervical adenopathy. Neurological: He is alert and oriented to person, place, and time. Skin: Skin is warm and dry. Capillary refill takes less than 2 seconds. He is not diaphoretic. Psychiatric: He has a normal mood and affect. His behavior is normal.   Nursing note and vitals reviewed. BP (!) 142/90 (Site: Left Upper Arm, Position: Sitting, Cuff Size: Large Adult)   Pulse 112   Temp 98.4 °F (36.9 °C) (Temporal)   Resp 20   Wt 171 lb (77.6 kg)   SpO2 99%   BMI 23.85 kg/m²      ASSESSMENT:      ICD-10-CM    1. Bronchitis J40 XR CHEST STANDARD (2 VW)     doxycycline hyclate (VIBRA-TABS) 100 MG tablet     promethazine-dextromethorphan (PROMETHAZINE-DM) 6.25-15 MG/5ML syrup   2.  Fever, unspecified R50.9 XR CHEST STANDARD (2 VW) doxycycline hyclate (VIBRA-TABS) 100 MG tablet       PLAN:    Maryojana Newer: Sinus Problem (Patient presents c/o sinus problems x 4 days); Fever (\"broke today\" taking Tylenol and Percocet); Cough; and Otalgia  Plan as associated above. Increase fluids, rest, tylenol or ibuprofen as needed. Follow up in 3 days if you are not feeling improvement and ASAP if worse. Recheck bp   Doxy can increase sunburn risk   Diagnosis and orders for this visit are above. Please note that this chart was generated using dragon dictationsoftware. Although every effort was made to ensure the accuracy of this automated transcription, some errors in transcription may have occurred.

## 2019-09-03 DIAGNOSIS — M51.36 DDD (DEGENERATIVE DISC DISEASE), LUMBAR: ICD-10-CM

## 2019-09-03 RX ORDER — OXYCODONE AND ACETAMINOPHEN 10; 325 MG/1; MG/1
1 TABLET ORAL EVERY 8 HOURS PRN
Qty: 90 TABLET | Refills: 0 | Status: SHIPPED | OUTPATIENT
Start: 2019-09-06 | End: 2019-09-30 | Stop reason: SDUPTHER

## 2019-09-11 ENCOUNTER — HOSPITAL ENCOUNTER (OUTPATIENT)
Dept: PAIN MANAGEMENT | Age: 49
Discharge: HOME | End: 2019-09-11
Payer: COMMERCIAL

## 2019-09-11 VITALS
SYSTOLIC BLOOD PRESSURE: 134 MMHG | DIASTOLIC BLOOD PRESSURE: 85 MMHG | OXYGEN SATURATION: 98 % | TEMPERATURE: 98 F | HEART RATE: 107 BPM | RESPIRATION RATE: 18 BRPM

## 2019-09-11 DIAGNOSIS — M47.816 LUMBAR FACET ARTHROPATHY: ICD-10-CM

## 2019-09-11 DIAGNOSIS — M50.10 CERVICAL DISC DISORDER WITH RADICULOPATHY: ICD-10-CM

## 2019-09-11 PROCEDURE — 20553 NJX 1/MLT TRIGGER POINTS 3/>: CPT | Performed by: NURSE PRACTITIONER

## 2019-09-11 PROCEDURE — 2500000003 HC RX 250 WO HCPCS

## 2019-09-11 PROCEDURE — 20553 NJX 1/MLT TRIGGER POINTS 3/>: CPT

## 2019-09-11 RX ORDER — BUPIVACAINE HYDROCHLORIDE 5 MG/ML
15 INJECTION, SOLUTION EPIDURAL; INTRACAUDAL ONCE
Status: DISCONTINUED | OUTPATIENT
Start: 2019-09-11 | End: 2019-09-13 | Stop reason: HOSPADM

## 2019-09-11 RX ORDER — LIDOCAINE HYDROCHLORIDE 10 MG/ML
15 INJECTION, SOLUTION EPIDURAL; INFILTRATION; INTRACAUDAL; PERINEURAL ONCE
Status: DISCONTINUED | OUTPATIENT
Start: 2019-09-11 | End: 2019-09-13 | Stop reason: HOSPADM

## 2019-09-11 NOTE — PROGRESS NOTES

## 2019-09-16 ENCOUNTER — TELEPHONE (OUTPATIENT)
Dept: PAIN MANAGEMENT | Age: 49
End: 2019-09-16

## 2019-09-30 DIAGNOSIS — M51.36 DDD (DEGENERATIVE DISC DISEASE), LUMBAR: ICD-10-CM

## 2019-10-01 RX ORDER — OXYCODONE AND ACETAMINOPHEN 10; 325 MG/1; MG/1
1 TABLET ORAL EVERY 8 HOURS PRN
Qty: 90 TABLET | Refills: 0 | Status: SHIPPED | OUTPATIENT
Start: 2019-10-06 | End: 2019-11-04 | Stop reason: SDUPTHER

## 2019-10-16 ENCOUNTER — HOSPITAL ENCOUNTER (OUTPATIENT)
Dept: PAIN MANAGEMENT | Age: 49
Discharge: HOME | End: 2019-10-16
Payer: COMMERCIAL

## 2019-10-16 VITALS
RESPIRATION RATE: 18 BRPM | HEART RATE: 111 BPM | DIASTOLIC BLOOD PRESSURE: 76 MMHG | OXYGEN SATURATION: 97 % | SYSTOLIC BLOOD PRESSURE: 114 MMHG | TEMPERATURE: 97.5 F

## 2019-10-16 DIAGNOSIS — G89.29 CHRONIC LEFT SHOULDER PAIN: ICD-10-CM

## 2019-10-16 DIAGNOSIS — M50.10 CERVICAL DISC DISORDER WITH RADICULOPATHY: ICD-10-CM

## 2019-10-16 DIAGNOSIS — M25.512 CHRONIC LEFT SHOULDER PAIN: ICD-10-CM

## 2019-10-16 PROCEDURE — 2500000003 HC RX 250 WO HCPCS

## 2019-10-16 PROCEDURE — 6360000002 HC RX W HCPCS

## 2019-10-16 PROCEDURE — 20611 DRAIN/INJ JOINT/BURSA W/US: CPT

## 2019-10-16 PROCEDURE — 20611 DRAIN/INJ JOINT/BURSA W/US: CPT | Performed by: NURSE PRACTITIONER

## 2019-10-16 RX ORDER — BUPIVACAINE HYDROCHLORIDE 5 MG/ML
INJECTION, SOLUTION EPIDURAL; INTRACAUDAL
Status: COMPLETED | OUTPATIENT
Start: 2019-10-16 | End: 2019-10-16

## 2019-10-16 RX ORDER — LIDOCAINE HYDROCHLORIDE 10 MG/ML
INJECTION, SOLUTION EPIDURAL; INFILTRATION; INTRACAUDAL; PERINEURAL
Status: COMPLETED | OUTPATIENT
Start: 2019-10-16 | End: 2019-10-16

## 2019-10-16 RX ORDER — TRIAMCINOLONE ACETONIDE 40 MG/ML
INJECTION, SUSPENSION INTRA-ARTICULAR; INTRAMUSCULAR
Status: COMPLETED | OUTPATIENT
Start: 2019-10-16 | End: 2019-10-16

## 2019-10-16 RX ADMIN — LIDOCAINE HYDROCHLORIDE 1 ML: 10 INJECTION, SOLUTION EPIDURAL; INFILTRATION; INTRACAUDAL; PERINEURAL at 11:23

## 2019-10-16 RX ADMIN — TRIAMCINOLONE ACETONIDE 40 MG: 40 INJECTION, SUSPENSION INTRA-ARTICULAR; INTRAMUSCULAR at 11:23

## 2019-10-16 RX ADMIN — BUPIVACAINE HYDROCHLORIDE 1 ML: 5 INJECTION, SOLUTION EPIDURAL; INTRACAUDAL at 11:22

## 2019-10-18 ENCOUNTER — TELEPHONE (OUTPATIENT)
Dept: PAIN MANAGEMENT | Age: 49
End: 2019-10-18

## 2019-11-04 DIAGNOSIS — M51.36 DDD (DEGENERATIVE DISC DISEASE), LUMBAR: ICD-10-CM

## 2019-11-05 RX ORDER — OXYCODONE AND ACETAMINOPHEN 10; 325 MG/1; MG/1
1 TABLET ORAL EVERY 8 HOURS PRN
Qty: 90 TABLET | Refills: 0 | Status: SHIPPED | OUTPATIENT
Start: 2019-11-05 | End: 2019-12-02 | Stop reason: SDUPTHER

## 2019-12-02 DIAGNOSIS — M51.36 DDD (DEGENERATIVE DISC DISEASE), LUMBAR: ICD-10-CM

## 2019-12-03 RX ORDER — OXYCODONE AND ACETAMINOPHEN 10; 325 MG/1; MG/1
1 TABLET ORAL EVERY 8 HOURS PRN
Qty: 90 TABLET | Refills: 0 | Status: SHIPPED | OUTPATIENT
Start: 2019-12-05 | End: 2020-01-02 | Stop reason: SDUPTHER

## 2020-01-02 NOTE — TELEPHONE ENCOUNTER
Patient has not been seen for an office visit since 8/12/19. Patient's next visit is on 1/13/20 which is a Monday. Sent enough medication until 1/14/20 which is a Tuesday for Dr. Godwin Burkitt to send a full months script.

## 2020-01-05 RX ORDER — OXYCODONE AND ACETAMINOPHEN 10; 325 MG/1; MG/1
1 TABLET ORAL EVERY 8 HOURS PRN
Qty: 30 TABLET | Refills: 0 | Status: SHIPPED | OUTPATIENT
Start: 2020-01-05 | End: 2020-11-08

## 2020-01-13 ENCOUNTER — HOSPITAL ENCOUNTER (OUTPATIENT)
Dept: PAIN MANAGEMENT | Age: 50
Discharge: HOME | End: 2020-01-13
Payer: COMMERCIAL

## 2020-01-14 ENCOUNTER — TELEPHONE (OUTPATIENT)
Dept: PAIN MANAGEMENT | Age: 50
End: 2020-01-14

## 2020-01-15 ENCOUNTER — OFFICE VISIT (OUTPATIENT)
Dept: FAMILY MEDICINE CLINIC | Age: 50
End: 2020-01-15
Payer: COMMERCIAL

## 2020-01-15 VITALS
HEIGHT: 71 IN | TEMPERATURE: 98.5 F | DIASTOLIC BLOOD PRESSURE: 88 MMHG | WEIGHT: 177 LBS | HEART RATE: 102 BPM | BODY MASS INDEX: 24.78 KG/M2 | RESPIRATION RATE: 18 BRPM | SYSTOLIC BLOOD PRESSURE: 128 MMHG | OXYGEN SATURATION: 99 %

## 2020-01-15 PROCEDURE — 87880 STREP A ASSAY W/OPTIC: CPT | Performed by: NURSE PRACTITIONER

## 2020-01-15 PROCEDURE — 99213 OFFICE O/P EST LOW 20 MIN: CPT | Performed by: NURSE PRACTITIONER

## 2020-01-15 PROCEDURE — 87804 INFLUENZA ASSAY W/OPTIC: CPT | Performed by: NURSE PRACTITIONER

## 2020-01-15 RX ORDER — METHYLPREDNISOLONE 4 MG/1
TABLET ORAL
Qty: 1 KIT | Refills: 0 | Status: SHIPPED | OUTPATIENT
Start: 2020-01-15 | End: 2020-11-08

## 2020-01-15 RX ORDER — BENZONATATE 200 MG/1
200 CAPSULE ORAL 3 TIMES DAILY PRN
Qty: 30 CAPSULE | Refills: 0 | Status: SHIPPED | OUTPATIENT
Start: 2020-01-15 | End: 2020-01-22

## 2020-01-15 RX ORDER — DOXYCYCLINE HYCLATE 100 MG
100 TABLET ORAL 2 TIMES DAILY
Qty: 20 TABLET | Refills: 0 | Status: SHIPPED | OUTPATIENT
Start: 2020-01-15 | End: 2020-01-25

## 2020-01-15 ASSESSMENT — ENCOUNTER SYMPTOMS
SORE THROAT: 1
COUGH: 1

## 2020-01-15 NOTE — PROGRESS NOTES
SUBJECTIVE:    Patient ID: Forest Velásquez is a53 y.o. male. Forest Velásquez is here today for Cough (Patient presents c/o productive cough x 1 week.); Chest Congestion; Head Congestion; Fever; Sweats; Generalized Body Aches; Pharyngitis; and Otalgia  . HPI:   HPI     Onset was approx 5days ago  Fever initially  Aches  Sore throat  Cough is productive  Has vomited from nausea with drainage  Ear pressure  No further sweats    POCT strep and flu --NEGATIVE              Past Medical History:   Diagnosis Date    Arthritis     Chronic back pain     sciatica    COPD (chronic obstructive pulmonary disease) (HCC)     Neck pain     HX vertebral fx with hardware     Prior to Visit Medications    Medication Sig Taking? Authorizing Provider   NONFORMULARY Indications: CBD OIL  Yes Historical Provider, MD   methylPREDNISolone (MEDROL DOSEPACK) 4 MG tablet Take by mouth. Yes NOEMI Rojo   doxycycline hyclate (VIBRA-TABS) 100 MG tablet Take 1 tablet by mouth 2 times daily for 10 days Yes NOEMI Rojo   benzonatate (TESSALON) 200 MG capsule Take 1 capsule by mouth 3 times daily as needed for Cough Yes NOEMI Rojo   oxyCODONE-acetaminophen (PERCOCET)  MG per tablet Take 1 tablet by mouth every 8 hours as needed for Pain for up to 10 days.  May fill 1/4/20, enough til appt Yes Aniket Bishop   acetaminophen (TYLENOL) 325 MG tablet Take 650 mg by mouth every 6 hours as needed for Pain Yes Historical Provider, MD   cyclobenzaprine (FLEXERIL) 10 MG tablet Take 1 tablet by mouth 2 times daily as needed for Muscle spasms  NOEMI Tong - CNP     Allergies   Allergen Reactions    Pcn [Penicillins]      Past Surgical History:   Procedure Laterality Date    ABDOMEN SURGERY Left 1/10/2019    EXCISION MASS GROIN performed by Jose Luis Lutz MD at  [address] [facility] 3/1/2017    ACDF C5-6, C6-7 WITH ALLOGRAFT BONE AND ANTERIOR CERVICAL PLATING  performed by Ian Palmer Respiratory: Positive for cough. Neurological: Negative for headaches. OBJECTIVE:    Physical Exam  Vitals signs and nursing note reviewed. Constitutional:       General: He is not in acute distress. Appearance: Normal appearance. He is well-developed. He is not diaphoretic. HENT:      Head: Normocephalic and atraumatic. Right Ear: Tympanic membrane, ear canal and external ear normal.      Left Ear: Tympanic membrane, ear canal and external ear normal.      Nose: Nose normal.      Right Sinus: No maxillary sinus tenderness or frontal sinus tenderness. Left Sinus: No maxillary sinus tenderness or frontal sinus tenderness. Mouth/Throat:      Mouth: Mucous membranes are moist.      Pharynx: Oropharynx is clear. Uvula midline. No oropharyngeal exudate or posterior oropharyngeal erythema. Eyes:      Extraocular Movements: Extraocular movements intact. Conjunctiva/sclera: Conjunctivae normal.      Pupils: Pupils are equal, round, and reactive to light. Neck:      Musculoskeletal: Normal range of motion and neck supple. Trachea: No tracheal deviation. Cardiovascular:      Rate and Rhythm: Normal rate and regular rhythm. Pulses: Normal pulses. Heart sounds: Normal heart sounds. Pulmonary:      Effort: Pulmonary effort is normal. No respiratory distress. Breath sounds: Normal breath sounds. Lymphadenopathy:      Cervical: No cervical adenopathy. Skin:     General: Skin is warm and dry. Capillary Refill: Capillary refill takes less than 2 seconds. Neurological:      General: No focal deficit present. Mental Status: He is alert and oriented to person, place, and time. Psychiatric:         Mood and Affect: Mood normal.         Behavior: Behavior normal.         Thought Content:  Thought content normal.         Judgment: Judgment normal.         /88 (Site: Left Upper Arm, Position: Sitting, Cuff Size: Large Adult)   Pulse 102   Temp 98.5 °F (36.9 °C) (Oral)   Resp 18   Ht 5' 11\" (1.803 m)   Wt 177 lb (80.3 kg)   SpO2 99%   BMI 24.69 kg/m²      ASSESSMENT:      ICD-10-CM    1. Acute URI J06.9 methylPREDNISolone (MEDROL DOSEPACK) 4 MG tablet     doxycycline hyclate (VIBRA-TABS) 100 MG tablet   2. Sore throat J02.9 POCT rapid strep A     POCT Influenza A/B   3. Fever, unspecified fever cause R50.9 POCT rapid strep A     POCT Influenza A/B   4. Body aches R52 POCT rapid strep A     POCT Influenza A/B       PLAN:    De La Cruz Matter: Cough (Patient presents c/o productive cough x 1 week.); Chest Congestion; Head Congestion; Fever; Sweats; Generalized Body Aches; Pharyngitis; and Otalgia  Flu and strep negative  Plan as associated above. Increase fluids, rest, tylenol or ibuprofen as needed. Follow up in 3 days if you are not feeling improvement and ASAP if worse. Work note today    Diagnosis and orders for this visit are above. Please note that this chart was generated using dragon dictationsoftware. Although every effort was made to ensure the accuracy of this automated transcription, some errors in transcription may have occurred.

## 2020-10-14 ENCOUNTER — OFFICE VISIT (OUTPATIENT)
Dept: PRIMARY CARE CLINIC | Age: 50
End: 2020-10-14
Payer: COMMERCIAL

## 2020-10-14 VITALS
DIASTOLIC BLOOD PRESSURE: 86 MMHG | WEIGHT: 171 LBS | SYSTOLIC BLOOD PRESSURE: 134 MMHG | HEART RATE: 111 BPM | TEMPERATURE: 98.2 F | BODY MASS INDEX: 24.48 KG/M2 | HEIGHT: 70 IN

## 2020-10-14 PROCEDURE — 99213 OFFICE O/P EST LOW 20 MIN: CPT | Performed by: NURSE PRACTITIONER

## 2020-10-14 RX ORDER — DEXTROMETHORPHAN HYDROBROMIDE AND PROMETHAZINE HYDROCHLORIDE 15; 6.25 MG/5ML; MG/5ML
SYRUP ORAL
Qty: 120 ML | Refills: 0 | Status: SHIPPED | OUTPATIENT
Start: 2020-10-14 | End: 2020-10-21

## 2020-10-14 ASSESSMENT — PATIENT HEALTH QUESTIONNAIRE - PHQ9
DEPRESSION UNABLE TO ASSESS: PT REFUSES
SUM OF ALL RESPONSES TO PHQ QUESTIONS 1-9: 0
SUM OF ALL RESPONSES TO PHQ9 QUESTIONS 1 & 2: 0
1. LITTLE INTEREST OR PLEASURE IN DOING THINGS: 0
2. FEELING DOWN, DEPRESSED OR HOPELESS: 0
SUM OF ALL RESPONSES TO PHQ QUESTIONS 1-9: 0
SUM OF ALL RESPONSES TO PHQ QUESTIONS 1-9: 0

## 2020-10-14 ASSESSMENT — ENCOUNTER SYMPTOMS
COUGH: 1
HEMOPTYSIS: 0
SINUS PAIN: 0
NAUSEA: 0
SORE THROAT: 0
ABDOMINAL PAIN: 0
SHORTNESS OF BREATH: 0
WHEEZING: 0
HEARTBURN: 0
CHEST TIGHTNESS: 0
DIARRHEA: 0
RHINORRHEA: 1
VOMITING: 0

## 2020-10-14 NOTE — PROGRESS NOTES
[address] [facility] [address] [address]  Dept: [phone]  Dept Fax: [phone]  Loc: [phone]    Adarsh Reza is a 53 y.o. male who presents today for his medical conditions/complaintsas noted below. Adarsh Reza is c/o of Otalgia (Bilateral ear pain x 3 days); Fever (x 3 days); and Nasal Congestion (x 3 days)      HPI:   Started on Monday moring with body aches, fever with sweats and chills. He is very fatigued and achy. Fever    This is a new problem. The current episode started in the past 7 days. The problem occurs 2 to 4 times per day. The problem has been unchanged. His temperature was unmeasured prior to arrival. Associated symptoms include congestion, coughing, ear pain and muscle aches. Pertinent negatives include no abdominal pain, chest pain, diarrhea, headaches, nausea, rash, sleepiness, sore throat, urinary pain, vomiting or wheezing. He has tried acetaminophen for the symptoms. The treatment provided moderate relief. Otalgia    There is pain in both ears. This is a new problem. The current episode started in the past 7 days. The problem occurs every few minutes. The problem has been unchanged. The pain is at a severity of 2/10. The pain is mild. Associated symptoms include coughing and rhinorrhea (clear). Pertinent negatives include no abdominal pain, diarrhea, ear discharge, headaches, rash, sore throat or vomiting. He has tried nothing for the symptoms. smoker   Cough   This is a new problem. The current episode started in the past 7 days. The problem has been unchanged. The cough is non-productive. Associated symptoms include chills, ear congestion, ear pain, a fever and rhinorrhea (clear). Pertinent negatives include no chest pain, headaches, heartburn, hemoptysis, nasal congestion, postnasal drip, rash, sore throat, shortness of breath or wheezing. The symptoms are aggravated by exercise.  He has tried nothing for Pulmonary:      Effort: Pulmonary effort is normal.      Breath sounds: Normal breath sounds. Abdominal:      General: Abdomen is flat. Bowel sounds are normal.   Musculoskeletal: Normal range of motion. Skin:     General: Skin is warm and dry. Neurological:      Mental Status: He is alert. No results found for this visit on 10/14/20. Assessment:      Diagnosis Orders   1. Viral upper respiratory tract infection  promethazine-dextromethorphan (PROMETHAZINE-DM) 6.25-15 MG/5ML syrup   2. Fever, unspecified fever cause         Plan:   Abdi Andrade was seen today for otalgia, fever and nasal congestion. Diagnoses and all orders for this visit:    Viral upper respiratory tract infection    Fever, unspecified fever cause    diatherex sent to the lab for Covid and flu      No follow-ups on file. Exam consistent with viral illness. Typically viruses pass on their own in 7-10 days. You may take Over the counter medications as directed for cough, congestion, discomfort, or fever. If symptoms worsen or do not improve then call the clinic for further instructions or follow up with your Primary Care provider. .  You were screened for COVID today and swabbed. You will be called with the results and any indicated treatments. You were provided with written CDC isolation/quarantine guidelines. Patient given educational materials- see patient instructions. Discussed use, benefit, and side effects of prescribedmedications. All patient questions answered. Pt voiced understanding.      Electronically signed by NOEMI Gomez CNP on 10/14/2020 at 1:29 PM

## 2020-10-14 NOTE — PATIENT INSTRUCTIONS
Exam consistent with viral illness. Typically viruses pass on their own in 7-10 days. You may take Over the counter medications as directed for cough, congestion, discomfort, or fever. If symptoms worsen or do not improve then call the clinic for further instructions or follow up with your Primary Care provider. .  You were screened for COVID today and swabbed. You will be called with the results and any indicated treatments. You were provided with written CDC isolation/quarantine guidelines.

## 2020-10-16 ENCOUNTER — TELEPHONE (OUTPATIENT)
Dept: PRIMARY CARE CLINIC | Age: 50
End: 2020-10-16

## 2020-11-08 ENCOUNTER — APPOINTMENT (OUTPATIENT)
Dept: GENERAL RADIOLOGY | Age: 50
End: 2020-11-08
Payer: COMMERCIAL

## 2020-11-08 ENCOUNTER — HOSPITAL ENCOUNTER (OUTPATIENT)
Age: 50
Setting detail: OBSERVATION
Discharge: HOME | End: 2020-11-10
Attending: EMERGENCY MEDICINE
Payer: COMMERCIAL

## 2020-11-08 PROCEDURE — 99999 PR OFFICE/OUTPT VISIT,PROCEDURE ONLY: CPT | Performed by: EMERGENCY MEDICINE

## 2020-11-08 PROCEDURE — 71045 X-RAY EXAM CHEST 1 VIEW: CPT

## 2020-11-08 PROCEDURE — 93005 ELECTROCARDIOGRAM TRACING: CPT | Performed by: EMERGENCY MEDICINE

## 2020-11-08 RX ORDER — KETOROLAC TROMETHAMINE 30 MG/ML
15 INJECTION, SOLUTION INTRAMUSCULAR; INTRAVENOUS ONCE
Status: COMPLETED | OUTPATIENT
Start: 2020-11-08 | End: 2020-11-09

## 2020-11-08 RX ORDER — ONDANSETRON 2 MG/ML
4 INJECTION INTRAMUSCULAR; INTRAVENOUS ONCE
Status: COMPLETED | OUTPATIENT
Start: 2020-11-08 | End: 2020-11-09

## 2020-11-08 RX ORDER — MORPHINE SULFATE 4 MG/ML
2 INJECTION, SOLUTION INTRAMUSCULAR; INTRAVENOUS ONCE
Status: COMPLETED | OUTPATIENT
Start: 2020-11-08 | End: 2020-11-09

## 2020-11-08 ASSESSMENT — ENCOUNTER SYMPTOMS
VOMITING: 0
RHINORRHEA: 0
ABDOMINAL PAIN: 0
VOICE CHANGE: 0
COUGH: 0
EYE PAIN: 0
EYE REDNESS: 0
SHORTNESS OF BREATH: 0
DIARRHEA: 0

## 2020-11-08 ASSESSMENT — PAIN DESCRIPTION - DESCRIPTORS: DESCRIPTORS: CRUSHING

## 2020-11-08 ASSESSMENT — PAIN DESCRIPTION - LOCATION: LOCATION: CHEST

## 2020-11-08 ASSESSMENT — PAIN SCALES - GENERAL: PAINLEVEL_OUTOF10: 10

## 2020-11-09 ENCOUNTER — APPOINTMENT (OUTPATIENT)
Dept: NUCLEAR MEDICINE | Age: 50
End: 2020-11-09
Payer: COMMERCIAL

## 2020-11-09 PROBLEM — I25.9 CHEST PAIN DUE TO MYOCARDIAL ISCHEMIA: Status: ACTIVE | Noted: 2020-11-09

## 2020-11-09 LAB
ALBUMIN SERPL-MCNC: 3.8 G/DL (ref 3.5–5.2)
ALP BLD-CCNC: 87 U/L (ref 40–130)
ALT SERPL-CCNC: 10 U/L (ref 5–41)
ANION GAP SERPL CALCULATED.3IONS-SCNC: 11 MMOL/L (ref 7–19)
ANION GAP SERPL CALCULATED.3IONS-SCNC: 8 MMOL/L (ref 7–19)
AST SERPL-CCNC: 10 U/L (ref 5–40)
BASOPHILS ABSOLUTE: 0 K/UL (ref 0–0.2)
BASOPHILS ABSOLUTE: 0.1 K/UL (ref 0–0.2)
BASOPHILS RELATIVE PERCENT: 0 % (ref 0–1)
BASOPHILS RELATIVE PERCENT: 0.3 % (ref 0–1)
BILIRUB SERPL-MCNC: <0.2 MG/DL (ref 0.2–1.2)
BILIRUBIN URINE: NEGATIVE
BLOOD, URINE: NEGATIVE
BUN BLDV-MCNC: 6 MG/DL (ref 6–20)
BUN BLDV-MCNC: 6 MG/DL (ref 6–20)
CALCIUM SERPL-MCNC: 9.2 MG/DL (ref 8.6–10)
CALCIUM SERPL-MCNC: 9.3 MG/DL (ref 8.6–10)
CHLORIDE BLD-SCNC: 104 MMOL/L (ref 98–111)
CHLORIDE BLD-SCNC: 105 MMOL/L (ref 98–111)
CHOLESTEROL, TOTAL: 173 MG/DL (ref 160–199)
CLARITY: CLEAR
CO2: 25 MMOL/L (ref 22–29)
CO2: 28 MMOL/L (ref 22–29)
COLOR: YELLOW
CREAT SERPL-MCNC: 0.4 MG/DL (ref 0.5–1.2)
CREAT SERPL-MCNC: 0.8 MG/DL (ref 0.5–1.2)
D DIMER: <0.27 UG/ML FEU (ref 0–0.48)
EKG P AXIS: 59 DEGREES
EKG P-R INTERVAL: 126 MS
EKG Q-T INTERVAL: 332 MS
EKG QRS DURATION: 100 MS
EKG QTC CALCULATION (BAZETT): 405 MS
EKG T AXIS: 56 DEGREES
EOSINOPHILS ABSOLUTE: 0.1 K/UL (ref 0–0.6)
EOSINOPHILS ABSOLUTE: 0.28 K/UL (ref 0–0.6)
EOSINOPHILS RELATIVE PERCENT: 0.9 % (ref 0–5)
EOSINOPHILS RELATIVE PERCENT: 2 % (ref 0–5)
GFR AFRICAN AMERICAN: >59
GFR AFRICAN AMERICAN: >59
GFR NON-AFRICAN AMERICAN: >60
GFR NON-AFRICAN AMERICAN: >60
GLUCOSE BLD-MCNC: 109 MG/DL (ref 74–109)
GLUCOSE BLD-MCNC: 91 MG/DL (ref 74–109)
GLUCOSE URINE: NEGATIVE MG/DL
HBA1C MFR BLD: 5.5 % (ref 4–6)
HCT VFR BLD CALC: 41.5 % (ref 42–52)
HCT VFR BLD CALC: 42.5 % (ref 42–52)
HDLC SERPL-MCNC: 24 MG/DL (ref 55–121)
HEMOGLOBIN: 13.7 G/DL (ref 14–18)
HEMOGLOBIN: 14.3 G/DL (ref 14–18)
IMMATURE GRANULOCYTES #: 0 K/UL
IMMATURE GRANULOCYTES #: 0.1 K/UL
KETONES, URINE: NEGATIVE MG/DL
LDL CHOLESTEROL CALCULATED: ABNORMAL MG/DL
LDL CHOLESTEROL DIRECT: 103 MG/DL
LEUKOCYTE ESTERASE, URINE: NEGATIVE
LV EF: 58 %
LVEF MODALITY: NORMAL
LYMPHOCYTES ABSOLUTE: 3.1 K/UL (ref 1.1–4.5)
LYMPHOCYTES ABSOLUTE: 6.1 K/UL (ref 1.1–4.5)
LYMPHOCYTES RELATIVE PERCENT: 21.5 % (ref 20–40)
LYMPHOCYTES RELATIVE PERCENT: 44 % (ref 20–40)
MAGNESIUM: 1.8 MG/DL (ref 1.6–2.6)
MAGNESIUM: 1.9 MG/DL (ref 1.6–2.6)
MCH RBC QN AUTO: 29.6 PG (ref 27–31)
MCH RBC QN AUTO: 30 PG (ref 27–31)
MCHC RBC AUTO-ENTMCNC: 33 G/DL (ref 33–37)
MCHC RBC AUTO-ENTMCNC: 33.6 G/DL (ref 33–37)
MCV RBC AUTO: 89.1 FL (ref 80–94)
MCV RBC AUTO: 89.6 FL (ref 80–94)
MONOCYTES ABSOLUTE: 0.1 K/UL (ref 0–0.9)
MONOCYTES ABSOLUTE: 1 K/UL (ref 0–0.9)
MONOCYTES RELATIVE PERCENT: 1 % (ref 0–10)
MONOCYTES RELATIVE PERCENT: 7.2 % (ref 0–10)
NEUTROPHILS ABSOLUTE: 10.1 K/UL (ref 1.5–7.5)
NEUTROPHILS ABSOLUTE: 7.3 K/UL (ref 1.5–7.5)
NEUTROPHILS RELATIVE PERCENT: 53 % (ref 50–65)
NEUTROPHILS RELATIVE PERCENT: 69.8 % (ref 50–65)
NITRITE, URINE: NEGATIVE
PDW BLD-RTO: 13.1 % (ref 11.5–14.5)
PDW BLD-RTO: 13.2 % (ref 11.5–14.5)
PH UA: 6 (ref 5–8)
PHOSPHORUS: 3.4 MG/DL (ref 2.5–4.5)
PLATELET # BLD: 240 K/UL (ref 130–400)
PLATELET # BLD: 304 K/UL (ref 130–400)
PLATELET SLIDE REVIEW: ADEQUATE
PMV BLD AUTO: 9.7 FL (ref 9.4–12.4)
PMV BLD AUTO: 9.9 FL (ref 9.4–12.4)
POTASSIUM REFLEX MAGNESIUM: 3.3 MMOL/L (ref 3.5–5)
POTASSIUM REFLEX MAGNESIUM: 4.3 MMOL/L (ref 3.5–5)
PRO-BNP: 54 PG/ML (ref 0–450)
PROCALCITONIN: 0.06 NG/ML (ref 0–0.09)
PROTEIN UA: NEGATIVE MG/DL
RBC # BLD: 4.63 M/UL (ref 4.7–6.1)
RBC # BLD: 4.77 M/UL (ref 4.7–6.1)
SODIUM BLD-SCNC: 140 MMOL/L (ref 136–145)
SODIUM BLD-SCNC: 141 MMOL/L (ref 136–145)
SPECIFIC GRAVITY UA: 1.01 (ref 1–1.03)
TOTAL PROTEIN: 6.7 G/DL (ref 6.6–8.7)
TRIGL SERPL-MCNC: 494 MG/DL (ref 0–149)
TROPONIN: <0.01 NG/ML (ref 0–0.03)
TSH REFLEX FT4: 3.44 UIU/ML (ref 0.35–5.5)
UROBILINOGEN, URINE: 0.2 E.U./DL
WBC # BLD: 13.8 K/UL (ref 4.8–10.8)
WBC # BLD: 14.5 K/UL (ref 4.8–10.8)

## 2020-11-09 PROCEDURE — 96375 TX/PRO/DX INJ NEW DRUG ADDON: CPT

## 2020-11-09 PROCEDURE — 96366 THER/PROPH/DIAG IV INF ADDON: CPT

## 2020-11-09 PROCEDURE — 93306 TTE W/DOPPLER COMPLETE: CPT

## 2020-11-09 PROCEDURE — 96372 THER/PROPH/DIAG INJ SC/IM: CPT

## 2020-11-09 PROCEDURE — 3430000000 HC RX DIAGNOSTIC RADIOPHARMACEUTICAL: Performed by: INTERNAL MEDICINE

## 2020-11-09 PROCEDURE — G0378 HOSPITAL OBSERVATION PER HR: HCPCS

## 2020-11-09 PROCEDURE — 84443 ASSAY THYROID STIM HORMONE: CPT

## 2020-11-09 PROCEDURE — 36415 COLL VENOUS BLD VENIPUNCTURE: CPT

## 2020-11-09 PROCEDURE — 80053 COMPREHEN METABOLIC PANEL: CPT

## 2020-11-09 PROCEDURE — 93017 CV STRESS TEST TRACING ONLY: CPT

## 2020-11-09 PROCEDURE — 84484 ASSAY OF TROPONIN QUANT: CPT

## 2020-11-09 PROCEDURE — 6370000000 HC RX 637 (ALT 250 FOR IP): Performed by: HOSPITALIST

## 2020-11-09 PROCEDURE — 93005 ELECTROCARDIOGRAM TRACING: CPT | Performed by: EMERGENCY MEDICINE

## 2020-11-09 PROCEDURE — A9500 TC99M SESTAMIBI: HCPCS | Performed by: INTERNAL MEDICINE

## 2020-11-09 PROCEDURE — 2580000003 HC RX 258: Performed by: INTERNAL MEDICINE

## 2020-11-09 PROCEDURE — 83735 ASSAY OF MAGNESIUM: CPT

## 2020-11-09 PROCEDURE — 85379 FIBRIN DEGRADATION QUANT: CPT

## 2020-11-09 PROCEDURE — 85025 COMPLETE CBC W/AUTO DIFF WBC: CPT

## 2020-11-09 PROCEDURE — 84100 ASSAY OF PHOSPHORUS: CPT

## 2020-11-09 PROCEDURE — 81003 URINALYSIS AUTO W/O SCOPE: CPT

## 2020-11-09 PROCEDURE — 83036 HEMOGLOBIN GLYCOSYLATED A1C: CPT

## 2020-11-09 PROCEDURE — 83721 ASSAY OF BLOOD LIPOPROTEIN: CPT

## 2020-11-09 PROCEDURE — 96365 THER/PROPH/DIAG IV INF INIT: CPT

## 2020-11-09 PROCEDURE — 6360000002 HC RX W HCPCS: Performed by: INTERNAL MEDICINE

## 2020-11-09 PROCEDURE — 96376 TX/PRO/DX INJ SAME DRUG ADON: CPT

## 2020-11-09 PROCEDURE — 2580000003 HC RX 258: Performed by: HOSPITALIST

## 2020-11-09 PROCEDURE — 99243 OFF/OP CNSLTJ NEW/EST LOW 30: CPT | Performed by: INTERNAL MEDICINE

## 2020-11-09 PROCEDURE — 93010 ELECTROCARDIOGRAM REPORT: CPT | Performed by: INTERNAL MEDICINE

## 2020-11-09 PROCEDURE — 99284 EMERGENCY DEPT VISIT MOD MDM: CPT

## 2020-11-09 PROCEDURE — 80061 LIPID PANEL: CPT

## 2020-11-09 PROCEDURE — 84145 PROCALCITONIN (PCT): CPT

## 2020-11-09 PROCEDURE — 6360000002 HC RX W HCPCS: Performed by: HOSPITALIST

## 2020-11-09 PROCEDURE — 96374 THER/PROPH/DIAG INJ IV PUSH: CPT

## 2020-11-09 PROCEDURE — 6360000002 HC RX W HCPCS: Performed by: EMERGENCY MEDICINE

## 2020-11-09 PROCEDURE — 83880 ASSAY OF NATRIURETIC PEPTIDE: CPT

## 2020-11-09 RX ORDER — MORPHINE SULFATE 4 MG/ML
2 INJECTION, SOLUTION INTRAMUSCULAR; INTRAVENOUS ONCE
Status: COMPLETED | OUTPATIENT
Start: 2020-11-09 | End: 2020-11-09

## 2020-11-09 RX ORDER — POTASSIUM CHLORIDE 7.45 MG/ML
10 INJECTION INTRAVENOUS PRN
Status: DISCONTINUED | OUTPATIENT
Start: 2020-11-09 | End: 2020-11-10 | Stop reason: HOSPADM

## 2020-11-09 RX ORDER — ACETAMINOPHEN 325 MG/1
650 TABLET ORAL EVERY 6 HOURS PRN
Status: DISCONTINUED | OUTPATIENT
Start: 2020-11-09 | End: 2020-11-10 | Stop reason: HOSPADM

## 2020-11-09 RX ORDER — POTASSIUM CHLORIDE 20 MEQ/1
40 TABLET, EXTENDED RELEASE ORAL PRN
Status: DISCONTINUED | OUTPATIENT
Start: 2020-11-09 | End: 2020-11-10 | Stop reason: HOSPADM

## 2020-11-09 RX ORDER — ACETAMINOPHEN 650 MG/1
650 SUPPOSITORY RECTAL EVERY 6 HOURS PRN
Status: DISCONTINUED | OUTPATIENT
Start: 2020-11-09 | End: 2020-11-10 | Stop reason: HOSPADM

## 2020-11-09 RX ORDER — PROMETHAZINE HYDROCHLORIDE 12.5 MG/1
12.5 TABLET ORAL EVERY 6 HOURS PRN
Status: DISCONTINUED | OUTPATIENT
Start: 2020-11-09 | End: 2020-11-10 | Stop reason: HOSPADM

## 2020-11-09 RX ORDER — POLYETHYLENE GLYCOL 3350 17 G/17G
17 POWDER, FOR SOLUTION ORAL DAILY PRN
Status: DISCONTINUED | OUTPATIENT
Start: 2020-11-09 | End: 2020-11-10 | Stop reason: HOSPADM

## 2020-11-09 RX ORDER — NITROGLYCERIN 0.4 MG/1
0.4 TABLET SUBLINGUAL EVERY 5 MIN PRN
Status: DISCONTINUED | OUTPATIENT
Start: 2020-11-09 | End: 2020-11-10 | Stop reason: HOSPADM

## 2020-11-09 RX ORDER — ATORVASTATIN CALCIUM 40 MG/1
40 TABLET, FILM COATED ORAL NIGHTLY
Status: DISCONTINUED | OUTPATIENT
Start: 2020-11-09 | End: 2020-11-10 | Stop reason: HOSPADM

## 2020-11-09 RX ORDER — ASPIRIN 81 MG/1
81 TABLET, CHEWABLE ORAL DAILY
Status: DISCONTINUED | OUTPATIENT
Start: 2020-11-10 | End: 2020-11-10 | Stop reason: HOSPADM

## 2020-11-09 RX ORDER — KETOROLAC TROMETHAMINE 30 MG/ML
30 INJECTION, SOLUTION INTRAMUSCULAR; INTRAVENOUS EVERY 6 HOURS PRN
Status: DISCONTINUED | OUTPATIENT
Start: 2020-11-09 | End: 2020-11-10 | Stop reason: HOSPADM

## 2020-11-09 RX ORDER — ONDANSETRON 2 MG/ML
4 INJECTION INTRAMUSCULAR; INTRAVENOUS EVERY 6 HOURS PRN
Status: DISCONTINUED | OUTPATIENT
Start: 2020-11-09 | End: 2020-11-10 | Stop reason: HOSPADM

## 2020-11-09 RX ORDER — SODIUM CHLORIDE 0.9 % (FLUSH) 0.9 %
10 SYRINGE (ML) INJECTION EVERY 12 HOURS SCHEDULED
Status: DISCONTINUED | OUTPATIENT
Start: 2020-11-09 | End: 2020-11-10 | Stop reason: HOSPADM

## 2020-11-09 RX ORDER — CYCLOBENZAPRINE HCL 10 MG
10 TABLET ORAL 2 TIMES DAILY PRN
Status: DISCONTINUED | OUTPATIENT
Start: 2020-11-09 | End: 2020-11-10 | Stop reason: HOSPADM

## 2020-11-09 RX ORDER — MAGNESIUM SULFATE IN WATER 40 MG/ML
2 INJECTION, SOLUTION INTRAVENOUS PRN
Status: DISCONTINUED | OUTPATIENT
Start: 2020-11-09 | End: 2020-11-10 | Stop reason: HOSPADM

## 2020-11-09 RX ORDER — SODIUM CHLORIDE 0.9 % (FLUSH) 0.9 %
10 SYRINGE (ML) INJECTION PRN
Status: DISCONTINUED | OUTPATIENT
Start: 2020-11-09 | End: 2020-11-10 | Stop reason: HOSPADM

## 2020-11-09 RX ADMIN — ENOXAPARIN SODIUM 40 MG: 40 INJECTION SUBCUTANEOUS at 09:29

## 2020-11-09 RX ADMIN — MORPHINE SULFATE 2 MG: 4 INJECTION, SOLUTION INTRAMUSCULAR; INTRAVENOUS at 02:18

## 2020-11-09 RX ADMIN — KETOROLAC TROMETHAMINE 15 MG: 30 INJECTION, SOLUTION INTRAMUSCULAR; INTRAVENOUS at 00:07

## 2020-11-09 RX ADMIN — ATORVASTATIN CALCIUM 40 MG: 40 TABLET, FILM COATED ORAL at 21:21

## 2020-11-09 RX ADMIN — MORPHINE SULFATE 2 MG: 4 INJECTION, SOLUTION INTRAMUSCULAR; INTRAVENOUS at 00:07

## 2020-11-09 RX ADMIN — SODIUM CHLORIDE, PRESERVATIVE FREE 10 ML: 5 INJECTION INTRAVENOUS at 21:21

## 2020-11-09 RX ADMIN — TETRAKIS(2-METHOXYISOBUTYLISOCYANIDE)COPPER(I) TETRAFLUOROBORATE 30 MILLICURIE: 1 INJECTION, POWDER, LYOPHILIZED, FOR SOLUTION INTRAVENOUS at 10:56

## 2020-11-09 RX ADMIN — ONDANSETRON 4 MG: 2 INJECTION INTRAMUSCULAR; INTRAVENOUS at 00:07

## 2020-11-09 RX ADMIN — CYCLOBENZAPRINE HYDROCHLORIDE 10 MG: 10 TABLET, FILM COATED ORAL at 21:24

## 2020-11-09 RX ADMIN — AZITHROMYCIN MONOHYDRATE 500 MG: 500 INJECTION, POWDER, LYOPHILIZED, FOR SOLUTION INTRAVENOUS at 12:26

## 2020-11-09 RX ADMIN — CYCLOBENZAPRINE HYDROCHLORIDE 10 MG: 10 TABLET, FILM COATED ORAL at 05:27

## 2020-11-09 RX ADMIN — TETRAKIS(2-METHOXYISOBUTYLISOCYANIDE)COPPER(I) TETRAFLUOROBORATE 10 MILLICURIE: 1 INJECTION, POWDER, LYOPHILIZED, FOR SOLUTION INTRAVENOUS at 10:55

## 2020-11-09 RX ADMIN — SODIUM CHLORIDE, PRESERVATIVE FREE 1 G: 5 INJECTION INTRAVENOUS at 09:29

## 2020-11-09 ASSESSMENT — ENCOUNTER SYMPTOMS
SHORTNESS OF BREATH: 0
WHEEZING: 0
BACK PAIN: 0
COUGH: 0
VOMITING: 0
BLOOD IN STOOL: 0
DIARRHEA: 0
ABDOMINAL PAIN: 0
ABDOMINAL DISTENTION: 0

## 2020-11-09 ASSESSMENT — PAIN SCALES - GENERAL
PAINLEVEL_OUTOF10: 5
PAINLEVEL_OUTOF10: 6
PAINLEVEL_OUTOF10: 10

## 2020-11-09 ASSESSMENT — PAIN DESCRIPTION - PAIN TYPE: TYPE: ACUTE PAIN;CHRONIC PAIN

## 2020-11-09 ASSESSMENT — PAIN DESCRIPTION - ORIENTATION: ORIENTATION: LEFT

## 2020-11-09 ASSESSMENT — PAIN DESCRIPTION - LOCATION: LOCATION: CHEST

## 2020-11-09 NOTE — ED PROVIDER NOTES
[facility]  EMERGENCY DEPARTMENT ENCOUNTER      Pt Name: Hali Irene  MRN: [id number]  [place] 1970  Date of evaluation: 11/8/2020  Provider: Cinthya Araujo MD    CHIEF COMPLAINT       Chief Complaint   Patient presents with    Chest Pain         HISTORY OF PRESENT ILLNESS   (Location/Symptom, Timing/Onset,Context/Setting, Quality, Duration, Modifying Factors, Severity)  Note limiting factors. Hali Irene is a 53 y.o. male who presents to the emergency department with complaint of chest pain that started just over an hour prior to arrival here. States the pain started around the area of his left shoulder but since his settled in the anterior sternal area. States pain is sharp and worsens with a deep breath in. Denies any associated shortness of breath, cough, fever, nausea vomiting, diaphoresis, or leg swelling. Patient states he has history of previous cervical fracture and initially thought pain was coming from his neck but now states it is clearly from his chest.  Patient does have smoking history but no previous cardiovascular history in the past.  States he had a negative stress test years ago but none recently. Also states he had a viral illness 2 to 3 weeks ago. Had negative Covid test at that time and symptoms had since resolved. Patient does note a history of pericarditis many years ago but cannot remember what the symptoms felt like at that time. HPI    NursingNotes were reviewed. REVIEW OF SYSTEMS    (2-9 systems for level 4, 10 or more for level 5)     Review of Systems   Constitutional: Negative for fatigue and fever. HENT: Negative for congestion, rhinorrhea and voice change. Eyes: Negative for pain and redness. Respiratory: Negative for cough and shortness of breath. Cardiovascular: Positive for chest pain. Gastrointestinal: Negative for abdominal pain, diarrhea and vomiting. Endocrine: Negative. Genitourinary: Negative. Musculoskeletal: Negative for arthralgias and gait problem. Skin: Negative for rash and wound. Neurological: Negative for weakness and headaches. Hematological: Negative. Psychiatric/Behavioral: Negative. All other systems reviewed and are negative. A complete review of systems was performed and is negative except as noted above in the HPI.        PAST MEDICAL HISTORY     Past Medical History:   Diagnosis Date    Arthritis     Chronic back pain     sciatica    COPD (chronic obstructive pulmonary disease) ([address]     Neck pain     HX vertebral fx with hardware         SURGICAL HISTORY       Past Surgical History:   Procedure Laterality Date    ABDOMEN SURGERY Left 1/10/2019    EXCISION MASS GROIN performed by Adrián Del Rio MD at [address] N/A 3/1/2017    ACDF C5-6, C6-7 WITH ALLOGRAFT BONE AND ANTERIOR CERVICAL PLATING  performed by Robert Dumont DO at [address] [place] [address] N/A 4/10/2018    Dr Baby Bamberger AP (-) dysplasia x 1--5 yr recall    NECK SURGERY      TONSILLECTOMY AND ADENOIDECTOMY           CURRENT MEDICATIONS       Previous Medications    ACETAMINOPHEN (TYLENOL) 325 MG TABLET    Take 650 mg by mouth every 6 hours as needed for Pain    CYCLOBENZAPRINE (FLEXERIL) 10 MG TABLET    Take 1 tablet by mouth 2 times daily as needed for Muscle spasms    NONFORMULARY    Indications: CBD OIL        ALLERGIES     Pcn [penicillins]    FAMILY HISTORY       Family History   Problem Relation Age of Onset    Cancer Mother         breast     Cancer Maternal Grandmother         breast     Cancer Father         colon          SOCIAL HISTORY       Social History     Socioeconomic History    Marital status:      Spouse name: None    Number of children: None    Years of education: None    Highest education level: None   Occupational History    None   Social Needs    Financial resource strain: None    Food insecurity     Worry: None     Inability: None    Transportation needs     Medical: None     Non-medical: None   Tobacco Use    Smoking status: Current Every Day Smoker     Packs/day: 1.50     Years: 30.00     Pack years: 45.00     Types: Cigarettes    Smokeless tobacco: Never Used   Substance and Sexual Activity    Alcohol use: Yes     Comment: occasionally    Drug use: No    Sexual activity: Not Currently     Partners: Female   Lifestyle    Physical activity     Days per week: None     Minutes per session: None    Stress: None   Relationships    Social connections     Talks on phone: None     Gets together: None     Attends [employer]ology service: None     Active member of club or organization: None     Attends meetings of clubs or organizations: None     Relationship status: None    Intimate partner violence     Fear of current or ex partner: None     Emotionally abused: None     Physically abused: None     Forced sexual activity: None   Other Topics Concern    None   Social History Narrative    None       SCREENINGS             PHYSICAL EXAM    (up to 7 for level 4, 8 or more for level 5)     ED Triage Vitals [11/08/20 2342]   BP Temp Temp Source Pulse Resp SpO2 Height Weight   135/81 99.1 °F (37.3 °C) Oral 113 25 94 % 5' 10\" (1.778 m) 175 lb (79.4 kg)       Physical Exam  Vitals signs and nursing note reviewed. Constitutional:       General: He is not in acute distress. Appearance: He is well-developed. He is not diaphoretic. HENT:      Head: Normocephalic and atraumatic. Eyes:      General: No scleral icterus. Neck:      Vascular: No JVD. Cardiovascular:      Rate and Rhythm: Normal rate and regular rhythm. Pulses:           Radial pulses are 2+ on the right side and 2+ on the left side. Dorsalis pedis pulses are 2+ on the right side and 2+ on the left side. Heart sounds: Normal heart sounds. No murmur. No friction rub. No gallop.     Pulmonary:      Effort: Pulmonary effort is normal. No accessory muscle usage or respiratory distress. Breath sounds: Normal breath sounds. No stridor. No decreased breath sounds, wheezing, rhonchi or rales. Chest:      Chest wall: No tenderness. Abdominal:      General: There is no distension. Palpations: Abdomen is soft. Tenderness: There is no abdominal tenderness. There is no guarding or rebound. Musculoskeletal: Normal range of motion. General: No deformity. Right lower leg: No edema. Left lower leg: No edema. Skin:     General: Skin is warm and dry. Coloration: Skin is not pale. Findings: No erythema. Neurological:      Mental Status: He is alert and oriented to person, place, and time. GCS: GCS eye subscore is 4. GCS verbal subscore is 5. GCS motor subscore is 6. Cranial Nerves: No cranial nerve deficit. Motor: No abnormal muscle tone.       Coordination: Coordination normal.   Psychiatric:         Behavior: Behavior normal.         Judgment: Judgment normal.         DIAGNOSTIC RESULTS     EKG: All EKG's are interpreted by the Emergency Department Physician who either signs or Co-signs this chart in the absence of a cardiologist.        RADIOLOGY:   Non-plain film images such as CT, Ultrasound and MRI are read by the radiologist. Shelby Speed images are visualized and preliminarily interpreted by the emergency physician with the below findings:      Interpretation per the Radiologist below, if available at the time of this note:    XR CHEST PORTABLE    (Results Pending)         ED BEDSIDE ULTRASOUND:   Performed by ED Physician - none    LABS:  Labs Reviewed   CBC WITH AUTO DIFFERENTIAL - Abnormal; Notable for the following components:       Result Value    WBC 13.8 (*)     Lymphocytes % 44.0 (*)     Lymphocytes Absolute 6.1 (*)     All other components within normal limits   COMPREHENSIVE METABOLIC PANEL W/ REFLEX TO MG FOR LOW K - Abnormal; Notable for the following components:    Potassium reflex Magnesium 3.3 (*) department prior to transfer of care to the medicine service. CONSULTS:  IP CONSULT TO CARDIOLOGY    PROCEDURES:  Unless otherwise notedbelow, none     Procedures    FINAL IMPRESSION     1. Chest pain, unspecified type    2. Chronic neck pain    3. History of pericarditis          DISPOSITION/PLAN   DISPOSITION Admitted 11/09/2020 02:57:00 AM      PATIENT REFERRED TO:  No follow-up provider specified.     DISCHARGE MEDICATIONS:  New Prescriptions    No medications on file          (Please note that portions of this note were completed with a voice recognition program.  Efforts were made to edit the dictations butoccasionally words are mis-transcribed.)    Charlaine Lanes, MD (electronically signed)  Emergency Physician          Charlaine Lanes., MD  11/09/20 9203

## 2020-11-09 NOTE — H&P
Patient Information:  Patient: Shaheen Parikh  MRN: [id number]   [place]: [de-identified]  YOB: 1970  Admit Date: 11/9/2020  Primary Care Physician: NOEMI Powers  Advance Directive: Full Code        SUBJECTIVE:    Chief Complaint   Patient presents with    Chest Pain     ED RN Sign Out & ED Physician's Note Review:  ARMENTA Score: >=4 (>=4 qualifies for inpatient work up)   Age (+1), Exertional CP story (+1), Risk Factors (+1) (smoking Hx), TnI - (+0), EKG (questionable RBBB) (+1)    HPI and ROS:  Mr. Shaheen Parikh is a pleasant 53year old  american gent. He has pain in the chest, it had begun yesterday in the left shoulder and radiated down the arm first, then it started to radiate in to the chest, and now it is coming from the chest. The pain in the shoulder was not initially concerning to him as he broke his neck ~9360-1047. He had a C4-C6 fusion not long after. He regularly has pains, but they do not feel like this. The pain from yesterday never went away, it has been there constantly since, and is getting worse. The pain is \"sharp, stabbing\". The pain was not worsened with emotion but was with the light exertion of walking around. The pain is not alleviated by rest. The NG from EMS provided no help, he had another nitrate here and the shoulder pain went away. He has never had anything like this before. The pain is positional. He has a pleuritic chest pain as well but states that that is a different pain. The pain is not reproducible however. The pain is not associated with: confusion, dyspnea, diaphoresis, nausea, near syncope, weakness, or fatigue. He also denies: fever, chills, change in base line cough, changes in taste, changes in smell, dysuria, and diarrhea. He wants us to know that he had had the flu 3-4 weeks ago.      (following subjective sections as per chart review, other than allergies)    Past Medical History:   Diagnosis Date    Arthritis     Chronic back pain sciatica    COPD (chronic obstructive pulmonary disease) ([facility]     Neck pain     HX vertebral fx with hardware     Past Surgical History:   Procedure Laterality Date    ABDOMEN SURGERY Left 1/10/2019    EXCISION MASS GROIN performed by Raj Laurent MD at [address] N/A 3/1/2017    ACDF C5-6, C6-7 WITH ALLOGRAFT BONE AND ANTERIOR CERVICAL PLATING  performed by Jung Sorensen DO at [facility] COLONOSCOPY N/A 4/10/2018    Dr Marianela Arrieta AP (-) dysplasia x 1--5 yr recall    NECK SURGERY      TONSILLECTOMY AND ADENOIDECTOMY       Social History     Tobacco History     Smoking Status  Current Every Day Smoker Smoking Frequency  1.5 packs/day for 30 years (39 pk yrs) Smoking Tobacco Type  Cigarettes    Smokeless Tobacco Use  Never Used          Alcohol History     Alcohol Use Status  Yes Comment  occasionally          Drug Use     Drug Use Status  No           Family History   Problem Relation Age of Onset    Cancer Mother         breast     Cancer Maternal Grandmother         breast     Cancer Father         colon     Allergies   Allergen Reactions    Pcn [Penicillins]      States his Mother told him     Current Facility-Administered Medications   Medication Dose Route Frequency Provider Last Rate Last Dose    cyclobenzaprine (FLEXERIL) tablet 10 mg  10 mg Oral BID PRN María Prieto MD        sodium chloride flush 0.9 % injection 10 mL  10 mL Intravenous 2 times per day María Prieto MD        sodium chloride flush 0.9 % injection 10 mL  10 mL Intravenous PRN María Prieto MD        acetaminophen (TYLENOL) tablet 650 mg  650 mg Oral Q6H PRN María Prieto MD        Or    acetaminophen (TYLENOL) suppository 650 mg  650 mg Rectal Q6H PRN María Prieto MD        promethazine (PHENERGAN) tablet 12.5 mg  12.5 mg Oral Q6H PRN María Prieto MD        Or    ondansetron [facility]) injection 4 mg  4 mg Intravenous Q6H PRN María Prieto MD        atorvastatin (LIPITOR) tablet 40 mg  40 mg Oral Nightly Wilton Soto MD        [START ON 11/10/2020] aspirin chewable tablet 81 mg  81 mg Oral Daily Wilton Soto MD        perflutren lipid microspheres (DEFINITY) injection 1.65 mg  1.5 mL Intravenous ONCE PRN Wilton Soto MD        potassium chloride (KLOR-CON M) extended release tablet 40 mEq  40 mEq Oral PRN Wilton Soto MD        Or    potassium bicarb-citric acid (EFFER-K) effervescent tablet 40 mEq  40 mEq Oral PRN Wilton Soto MD        Or    potassium chloride 10 mEq/100 mL IVPB (Peripheral Line)  10 mEq Intravenous PRN Wilton Soto MD        magnesium sulfate 2 g in 50 mL IVPB premix  2 g Intravenous PRN Wilton Soto MD        polyethylene glycol [facility]) packet 17 g  17 g Oral Daily PRN Wilton Soto MD        enoxaparin (LOVENOX) injection 40 mg  40 mg Subcutaneous Daily Wilton Soto MD        nitroGLYCERIN (NITROSTAT) SL tablet 0.4 mg  0.4 mg Sublingual Q5 Min PRN Wilton Soto MD        ketorolac (TORADOL) injection 30 mg  30 mg Intravenous Q6H PRN Wilton Soto MD         Home Medications:  Prior to Admission medications    Medication Sig Start Date End Date Taking? Authorizing Provider   cyclobenzaprine (FLEXERIL) 10 MG tablet Take 1 tablet by mouth 2 times daily as needed for Muscle spasms 8/12/19 11/8/20 Yes NOEMI Dixon CNP   acetaminophen (TYLENOL) 325 MG tablet Take 650 mg by mouth every 6 hours as needed for Pain   Yes Historical Provider, MD   NONFORMULARY Indications: CBD OIL     Historical Provider, MD         OBJECTIVE:    Vitals:    11/09/20 0232   BP: 127/76   Pulse: 81   Resp: 25   Temp:    SpO2:    breathing on room air    /76   Pulse 81   Temp 99.1 °F (37.3 °C) (Oral)   Resp 25   Ht 5' 10\" (1.778 m)   Wt 175 lb (79.4 kg)   SpO2 94%   BMI 25.11 kg/m²     No intake or output data in the 24 hours ending 11/09/20 0306    Physical Exam  Vitals signs reviewed.    Constitutional:       General: He is in acute distress. Appearance: Normal appearance. He is normal weight. He is not ill-appearing or toxic-appearing. HENT:      Head: Normocephalic and atraumatic. Nose: No congestion or rhinorrhea. Eyes:      General:         Right eye: No discharge. Left eye: No discharge. Neck:      Musculoskeletal: Neck supple. Comments: Trachea appears midline  Cardiovascular:      Rate and Rhythm: Normal rate and regular rhythm. Heart sounds: No murmur. No friction rub. No gallop. Pulmonary:      Effort: Pulmonary effort is normal. No respiratory distress. Breath sounds: No stridor. No wheezing, rhonchi or rales. Chest:      Chest wall: No tenderness. Abdominal:      General: Bowel sounds are normal.      Palpations: Abdomen is soft. Tenderness: There is no abdominal tenderness. There is no guarding or rebound. Musculoskeletal:         General: No tenderness. Right lower leg: No edema. Left lower leg: No edema. Skin:     General: Skin is warm. Comments: nondiaphoretic   Neurological:      Mental Status: He is alert. Cranial Nerves: No dysarthria. Motor: No tremor or seizure activity. Psychiatric:         Mood and Affect: Mood normal.         Behavior: Behavior normal.        LABORATORY DATA:    CBC:   Recent Labs     11/08/20 2342   WBC 13.8*   HGB 14.3   HCT 42.5        BMP:   Recent Labs     11/08/20 2342      K 3.3*      CO2 25   BUN 6   CREATININE 0.8   CALCIUM 9.2     Hepatic Profile:   Recent Labs     11/08/20  2342   AST 10   ALT 10   BILITOT <0.2   ALKPHOS 87     Coag Panel: No results for input(s): INR, PROTIME, APTT in the last 72 hours.     Cardiac Enzymes:   Recent Labs     11/08/20 2342 11/09/20  0205   TROPONINI <0.01 <0.01     Pro-BNP:   Recent Labs     11/08/20  2342   PROBNP 54     A1C:   Recent Labs     11/08/20 2342   LABA1C 5.5     TSH: Invalid input(s): LABTS    Lipid Panel: pending results    Urinalysis:   Lab Results   Component Value Date    NITRU Negative 11/09/2020    BLOODU Negative 11/09/2020    SPECGRAV 1.010 11/09/2020    GLUCOSEU Negative 11/09/2020       EKG:   ?RBBB reported by his ED RN, non-specific changes    IMAGING:  No results found. ASESSMENTS & PLANS:    Pericarditis versus Pleuritis versus suspected versus less likely Myocardial Ischemia as cause of  Chest Pain:  Tele  Admit to cardiac choi as OBSERVATION status patient  Cardio consult in AM  Trend TnI  Mag, Phos, TSH, Lipid Panel, HbA1c - will run as add ons to ED labs if able  CBC and BMP with Reflex for following AM  ASA as per protocol (324-325mg chewed STAT unless on 81ASA daily in which case 162mg chewed STAT if not yet given, THEN from following AM 81mg Daily.)  Statin as per protocol (High intensity Statin, if already on high intensity Stain of Simvasttain 80 continue it, if Lipitor 40+ then Lipitor 80 (if less than 40 just double so long as >=40), if Rosuvastatin 20mg+ then Rosuvastatin 40mg PO QHS (if less than 20 just double so long as >=20mg)  NG SL PRN CP  TTE in AM  Toradol 30mg IVP Q6h PRN starting at 6am (as per EP dose timing)    Muscle Spasms:  Flexeril 10mg PO BID PRN as per home    Supoportive and Prophylactic Txx:  DVT Prophylaxis: lovenox SQ  GI (PUD) Ppx: not indicated  PT consult for evalutation and Txx as indicated: not indicated  Cardiac Diet, No Caffeine  cyclobenzaprine, sodium chloride flush, acetaminophen **OR** acetaminophen, promethazine **OR** ondansetron, perflutren lipid microspheres, potassium chloride **OR** potassium alternative oral replacement **OR** potassium chloride, magnesium sulfate, polyethylene glycol, nitroGLYCERIN, ketorolac      Care time of >45 minutes  Pt seen/examined and admitted to OBServation status.   Inpatient status is used for patients with an expected LOS extending past two midnights due to medical therapy and or critical care needs, otherwise patients are placed to OBServation status. Signed:  Electronically signed by Jocelyne Kamara MD on 11/9/20 at 05:25 AM CST.

## 2020-11-09 NOTE — ED NOTES
Bed: 05  Expected date:   Expected time:   Means of arrival:   Comments:  DOMINGA Reed  11/08/20 5460

## 2020-11-10 ENCOUNTER — TELEPHONE (OUTPATIENT)
Dept: FAMILY MEDICINE CLINIC | Age: 50
End: 2020-11-10

## 2020-11-10 VITALS
DIASTOLIC BLOOD PRESSURE: 68 MMHG | HEIGHT: 70 IN | TEMPERATURE: 97.6 F | WEIGHT: 174.8 LBS | OXYGEN SATURATION: 92 % | SYSTOLIC BLOOD PRESSURE: 101 MMHG | BODY MASS INDEX: 25.03 KG/M2 | HEART RATE: 74 BPM | RESPIRATION RATE: 18 BRPM

## 2020-11-10 LAB
ANION GAP SERPL CALCULATED.3IONS-SCNC: 11 MMOL/L (ref 7–19)
BASOPHILS ABSOLUTE: 0 K/UL (ref 0–0.2)
BASOPHILS RELATIVE PERCENT: 0.3 % (ref 0–1)
BUN BLDV-MCNC: 7 MG/DL (ref 6–20)
CALCIUM SERPL-MCNC: 9.2 MG/DL (ref 8.6–10)
CHLORIDE BLD-SCNC: 104 MMOL/L (ref 98–111)
CO2: 25 MMOL/L (ref 22–29)
CREAT SERPL-MCNC: 0.8 MG/DL (ref 0.5–1.2)
EKG P AXIS: 32 DEGREES
EKG P-R INTERVAL: 132 MS
EKG Q-T INTERVAL: 388 MS
EKG QRS DURATION: 92 MS
EKG QTC CALCULATION (BAZETT): 403 MS
EKG T AXIS: 9 DEGREES
EOSINOPHILS ABSOLUTE: 0.2 K/UL (ref 0–0.6)
EOSINOPHILS RELATIVE PERCENT: 1.6 % (ref 0–5)
GFR AFRICAN AMERICAN: >59
GFR NON-AFRICAN AMERICAN: >60
GLUCOSE BLD-MCNC: 84 MG/DL (ref 74–109)
HCT VFR BLD CALC: 41.9 % (ref 42–52)
HEMOGLOBIN: 13.8 G/DL (ref 14–18)
IMMATURE GRANULOCYTES #: 0 K/UL
LYMPHOCYTES ABSOLUTE: 3.7 K/UL (ref 1.1–4.5)
LYMPHOCYTES RELATIVE PERCENT: 37.7 % (ref 20–40)
MCH RBC QN AUTO: 29.4 PG (ref 27–31)
MCHC RBC AUTO-ENTMCNC: 32.9 G/DL (ref 33–37)
MCV RBC AUTO: 89.1 FL (ref 80–94)
MONOCYTES ABSOLUTE: 0.9 K/UL (ref 0–0.9)
MONOCYTES RELATIVE PERCENT: 8.8 % (ref 0–10)
NEUTROPHILS ABSOLUTE: 5 K/UL (ref 1.5–7.5)
NEUTROPHILS RELATIVE PERCENT: 51.2 % (ref 50–65)
PDW BLD-RTO: 13.1 % (ref 11.5–14.5)
PLATELET # BLD: 254 K/UL (ref 130–400)
PMV BLD AUTO: 10 FL (ref 9.4–12.4)
POTASSIUM REFLEX MAGNESIUM: 4.2 MMOL/L (ref 3.5–5)
RBC # BLD: 4.7 M/UL (ref 4.7–6.1)
SODIUM BLD-SCNC: 140 MMOL/L (ref 136–145)
WBC # BLD: 9.7 K/UL (ref 4.8–10.8)

## 2020-11-10 PROCEDURE — 6360000002 HC RX W HCPCS: Performed by: INTERNAL MEDICINE

## 2020-11-10 PROCEDURE — 96376 TX/PRO/DX INJ SAME DRUG ADON: CPT

## 2020-11-10 PROCEDURE — 93005 ELECTROCARDIOGRAM TRACING: CPT | Performed by: HOSPITALIST

## 2020-11-10 PROCEDURE — 96372 THER/PROPH/DIAG INJ SC/IM: CPT

## 2020-11-10 PROCEDURE — G0378 HOSPITAL OBSERVATION PER HR: HCPCS

## 2020-11-10 PROCEDURE — 85025 COMPLETE CBC W/AUTO DIFF WBC: CPT

## 2020-11-10 PROCEDURE — 2580000003 HC RX 258: Performed by: HOSPITALIST

## 2020-11-10 PROCEDURE — 6370000000 HC RX 637 (ALT 250 FOR IP): Performed by: HOSPITALIST

## 2020-11-10 PROCEDURE — 6360000002 HC RX W HCPCS: Performed by: HOSPITALIST

## 2020-11-10 PROCEDURE — 36415 COLL VENOUS BLD VENIPUNCTURE: CPT

## 2020-11-10 PROCEDURE — 80048 BASIC METABOLIC PNL TOTAL CA: CPT

## 2020-11-10 PROCEDURE — 93010 ELECTROCARDIOGRAM REPORT: CPT | Performed by: INTERNAL MEDICINE

## 2020-11-10 PROCEDURE — 2580000003 HC RX 258: Performed by: INTERNAL MEDICINE

## 2020-11-10 RX ADMIN — ENOXAPARIN SODIUM 40 MG: 40 INJECTION SUBCUTANEOUS at 09:24

## 2020-11-10 RX ADMIN — ASPIRIN 81 MG: 81 TABLET, CHEWABLE ORAL at 09:25

## 2020-11-10 RX ADMIN — SODIUM CHLORIDE, PRESERVATIVE FREE 1 G: 5 INJECTION INTRAVENOUS at 09:25

## 2020-11-10 RX ADMIN — SODIUM CHLORIDE, PRESERVATIVE FREE 10 ML: 5 INJECTION INTRAVENOUS at 09:24

## 2020-11-10 ASSESSMENT — PAIN SCALES - GENERAL: PAINLEVEL_OUTOF10: 0

## 2020-11-10 NOTE — TELEPHONE ENCOUNTER
[facility] Trans[facility] Initial Follow Up Call    Call within 2 business days of discharge: Yes     Patient: Dmitri Adamson Patient : 1970   MRN: [id number]  Reason for Admission: chest pain  Discharge Date: 11/10/20 RARS: No data recorded     Spoke with: patient    Discharge department/facility: [facility] [place]    Non-face-to-face services provided:  Scheduled appointment with PCP-Brittny Clarke     Pt is doing well and understands all discharge instructions. Pt is aware of F/U appt and will call if he needs anything before this time.      Follow Up  Future Appointments   Date Time Provider Chayito Downing   2020 11:40 AM NOEMI Cade [facility]   2020 10:30 AM NOEMI King RMA [facility]   2020 11:00 AM NOEMI Miranda Cardio [facility]       Wayne Raygoza

## 2020-11-10 NOTE — CONSULTS
[facility]  Cardiology Consult      Requesting MD:  Herminia Onofre MD   Admit Status:  Observation [104]       History obtained from:   ? Patient  ? Other (specify):     PROBLEM LIST:    Patient Active Problem List    Diagnosis Date Noted    Chest pain due to myocardial ischemia 11/09/2020     Priority: Low    Chronic left shoulder pain 10/16/2019     Priority: Low    Chronic left-sided thoracic back pain 01/29/2019     Priority: Low    Left groin mass      Priority: Low    Chronic pain of both shoulders 12/18/2018     Priority: Low    CTS (carpal tunnel syndrome) 11/12/2018     Priority: Low    Numbness      Priority: Low    Lumbar radiculopathy 09/12/2018     Priority: Low    Sacroiliitis ([address] 06/12/2018     Priority: Low    Myofascial pain 06/12/2018     Priority: Low    Lumbar facet arthropathy 04/25/2018     Priority: Low    Lumbar facet arthropathy 01/12/2018     Priority: Low     Overview Note:     MRI 10/4/18  L1, space L1-2. There is bilateral facet arthropathy. L2, space L2-3. There is bilateral facet arthropathy. L3, space L3-4. There is moderate facet arthropathy.  Cervical disc disorder with radiculopathy 10/25/2017     Priority: Low    Osteoarthritis of spine with radiculopathy, cervical region 03/02/2017     Priority: Low    Chronic back pain      Priority: Low     Overview Note:     sciatica      Chronic neck pain      Priority: Low     Overview Note:     HX vertebral fx with hardware      MVP (mitral valve prolapse)      Priority: Low    COPD (chronic obstructive pulmonary disease) ([facility])      Priority: Low     1. Atypical chest pains with negative troponins, negative stress nuclear study, normal LV ejection fraction. 2.  Active ongoing tobacco use. 3.  History of cervical spine fractures with chronic pain.     PRESENTATION: Shaheen Parikh is a 53y.o. year old male who presents with left shoulder and neck pain which spread to his chest. He initially attributed this to his cervical spine fracture for which she suffers from chronic pain. He has been having some discomfort for a month after suffering from an upper respiratory infection. Patient reports no chest pain currently. Chest pain yesterday occurred while playing a video game. The pain was nonstop. Pain seem to be better with lying on his left side and worse with lying on his right side. Symptoms spread from left shoulder to neck and arm. During stress test he reports he had burning sensation in his chest with breathing but this has since resolved. No EKG changes were noted. He is an active tobacco user smoking 1 pack/day. Unclear on family history as mother  young from cancer. Does not know his father's history. REVIEW OF SYSTEMS:  Review of Systems   Constitutional: Negative for activity change, diaphoresis and fatigue. HENT: Negative for hearing loss, nosebleeds and tinnitus. Eyes: Negative for visual disturbance. Respiratory: Negative for cough, shortness of breath and wheezing. Cardiovascular: Positive for chest pain. Negative for palpitations and leg swelling. Gastrointestinal: Negative for abdominal distention, abdominal pain, blood in stool, diarrhea and vomiting. Endocrine: Negative for cold intolerance, heat intolerance, polydipsia, polyphagia and polyuria. Genitourinary: Negative for difficulty urinating, flank pain and hematuria. Musculoskeletal: Negative for arthralgias, back pain, joint swelling and myalgias. Skin: Negative for pallor and rash. Neurological: Negative for dizziness, seizures, syncope and headaches. Psychiatric/Behavioral: Negative for behavioral problems and dysphoric mood. The patient is not nervous/anxious.         Past Medical History:      Diagnosis Date    Arthritis     Chronic back pain     sciatica    COPD (chronic obstructive pulmonary disease) (HCC)     Neck pain     HX vertebral fx with hardware       Past Surgical History:      Procedure Laterality Date    ABDOMEN SURGERY Left 1/10/2019    EXCISION MASS GROIN performed by Ronda Momin MD at [address] N/A 3/1/2017    ACDF C5-6, C6-7 WITH ALLOGRAFT BONE AND ANTERIOR CERVICAL PLATING  performed by Maynor Salazar DO at [address] COLONOSCOPY N/A 4/10/2018    Dr Katelin Odonnell AP (-) dysplasia x 1--5 yr recall    NECK SURGERY      TONSILLECTOMY AND ADENOIDECTOMY         Allergies:  Pcn [penicillins]    Past Social History:  Social History     Socioeconomic History    Marital status:      Spouse name: Not on file    Number of children: Not on file    Years of education: Not on file    Highest education level: Not on file   Occupational History    Not on file   Social Needs    Financial resource strain: Not on file    Food insecurity     Worry: Not on file     Inability: Not on file    Transportation needs     Medical: Not on file     Non-medical: Not on file   Tobacco Use    Smoking status: Current Every Day Smoker     Packs/day: 1.50     Years: 30.00     Pack years: 45.00     Types: Cigarettes    Smokeless tobacco: Never Used   Substance and Sexual Activity    Alcohol use: Yes     Comment: occasionally    Drug use: No    Sexual activity: Not Currently     Partners: Female   Lifestyle    Physical activity     Days per week: Not on file     Minutes per session: Not on file    Stress: Not on file   Relationships    Social connections     Talks on phone: Not on file     Gets together: Not on file     Attends [employer] service: Not on file     Active member of club or organization: Not on file     Attends meetings of clubs or organizations: Not on file     Relationship status: Not on file    Intimate partner violence     Fear of current or ex partner: Not on file     Emotionally abused: Not on file     Physically abused: Not on file     Forced sexual activity: Not on file   Other Topics Concern    Not on file edema  No significant systolic or diastolic murmurs appreciated  No gallop noted  Pulmonary:      Effort: No respiratory distress. Breath sounds: No stridor. No wheezing or rales. Abdominal:      General: Bowel sounds are normal. There is no distension. Palpations: Abdomen is soft. There is no mass. Tenderness: There is no abdominal tenderness. There is no guarding or rebound. Comments: No palpable organomegaly   Musculoskeletal:         General: No deformity. Comments: No chest wall tenderness  No significant left shoulder tenderness   Skin:     General: Skin is warm. Coloration: Skin is not pale. Findings: No erythema or rash. Neurological:      Mental Status: He is alert and oriented to person, place, and time. Motor: No abnormal muscle tone. Coordination: Coordination normal.      Deep Tendon Reflexes: Reflexes normal.           Labs:  Recent Labs     11/08/20 2342 11/09/20  0436   WBC 13.8* 14.5*   HGB 14.3 13.7*    240       Recent Labs     11/08/20 2342 11/09/20  0436    141   K 3.3* 4.3    105   CO2 25 28   BUN 6 6   CREATININE 0.8 0.4*   LABGLOM >60 >60   MG 1.9  1.8  --    CALCIUM 9.2 9.3   PHOS 3.4  --        CK, CKMB, Troponin: @LABRCNT (CKTOTAL:3, CKMB:3, TROPONINI:3)@    Last 3 BNP:          IMAGING:  Xr Chest Portable    Result Date: 11/9/2020  Examination. XR CHEST PORTABLE 11/8/2020 10:53 PM History: Chest pain. A single, frontal, portable, upright view of the chest is compared with the previous study dated 8/19/2019. There is interstitial infiltrate in the left lower lung which was not noted in the previous study. There is a trace left basal pleural effusion. The lungs are well inflated. There is no pulmonary congestion or pneumothorax. The heart size in the normal range. No significant bony abnormality. Hardware fusion of the lower cervical spine is partially visualized.     The left lower lung infiltrate may be present inflammatory/infectious process. Signed by Dr Rosalio Davidson on 11/9/2020 6:58 AM    Nm Myocardial Spect Rest Exercise Or Rx    Result Date: 11/9/2020  Treadmill  Nuclear Stress Test Report Procedure date: 11/09/20 Indications: chest pain Procedure: Stress was performed using a standard [place] protocol. Vital signs and EKG were monitored. Technetium-99 sestamibi was injected in divided doses, approximately 10 mCi and 30 mCi respectively for rest and stress imaging. The patient was discharged in stable condition. Results: Patient had  dyspnea and chest pain rated 7/10 during exercise that resolved in recovery. Baseline EKG showed normal sinus rhythm without ST/T changes. During stress there was  nonspecific ST changes. Changes resolved in recovery. Baseline and peak blood pressures were 131/75, and 176/77 respectively. Baseline and peak heart rates were 74 and  134 respectively. The patient exercised for 2:59 minutes on a standard [place] protocol obtaining target heart rate. Lexiscan/Cardiolyte Nuclear Medicine Report Date of Procedure: 11/9/2020 The patient was injected with 9.4 millicuries (mCi) of Technetium (Tc99m). After an appropriate level of stress the patient was re-injected with 87.4 millicuries (mCi) of Technetium (Tc99m). Repeat gated images were then performed per standard protocol. Findings: 1. Analysis of the the stress and rest images reveals no obvious defects on stress and rest images. 2.  Analysis of the gated images reveals grossly normal left ventricular function with a calculated ejection fraction of 58 %. Impression: Low functional tolerance with symptoms and without EKG changes suggestive of ischemia. There is no obvious infarct or ischemia, with a calculated ejection fraction of   58  %. Suggest: Clinical correlation as patient had significant reported chest pain at low functional tolerance with no significant EKG changes noted.  Signed by Dr Devon Pichardo on 11/9/2020 8:42 PM          Assessment and Plan: This is a 53y.o. year old male with past medical history of chronic tobacco use, history of chronic pain from cervical fractures presenting with atypical constant chest pain, negative troponins, no significant ST-T changes, low level exercise stress test without ischemia but different quality symptom of chest burning, structurally normal heart by echo. 1.  Have discussed with patient in detail issues. He is an active tobacco user. His symptoms however are atypical.  No obvious infarct or ischemia noted on his nuclear imaging. Poor functional tolerance which was stopped due to describing burning quality symptom with breathing walking on the treadmill. No ST-T changes noted however. Despite protracted chest pain troponins have been negative. Discussed option of cardiac catheterization with patient. Currently he is disinclined to follow through in this regard. Have discussed with patient the absolute need for tobacco cessation as this is truly detrimental to his health. This could be the single best intervention he could make going forward in terms of preventative care. 2.  Currently asymptomatic and will pursue medical management. If recurrent symptomatology, patient will consider possible cardiac catheterization. Can be discharged in a.m. if ambulatory with no further symptoms. Follow-up as an outpatient.       Electronically signed by Afia Valdez MD on 11/9/2020 at 11:16 PM

## 2020-11-10 NOTE — DISCHARGE SUMMARY
Discharge Summary    NAME: Arcadio Zaman  :  1970  MRN:  [id number]    Admit date:  2020  Discharge date:  11/10/2020    Admitting Physician:  Dr Angie Wright Directive: Full Code    Consults: Cardiology    Primary Care Physician:  NOEMI Houser    Discharge Diagnoses:      Chest pain         Significant Diagnostic Studies:   Xr Chest Portable    Result Date: 2020  Examination. XR CHEST PORTABLE 2020 10:53 PM History: Chest pain. A single, frontal, portable, upright view of the chest is compared with the previous study dated 2019. There is interstitial infiltrate in the left lower lung which was not noted in the previous study. There is a trace left basal pleural effusion. The lungs are well inflated. There is no pulmonary congestion or pneumothorax. The heart size in the normal range. No significant bony abnormality. Hardware fusion of the lower cervical spine is partially visualized. The left lower lung infiltrate may be present inflammatory/infectious process. Signed by Dr Kelby Samaniego on 2020 6:58 AM    Nm Myocardial Spect Rest Exercise Or Rx    Result Date: 2020  Treadmill  Nuclear Stress Test Report Procedure date: 20 Indications: chest pain Procedure: Stress was performed using a standard [place] protocol. Vital signs and EKG were monitored. Technetium-99 sestamibi was injected in divided doses, approximately 10 mCi and 30 mCi respectively for rest and stress imaging. The patient was discharged in stable condition. Results: Patient had  dyspnea and chest pain rated 7/10 during exercise that resolved in recovery. Baseline EKG showed normal sinus rhythm without ST/T changes. During stress there was  nonspecific ST changes. Changes resolved in recovery. Baseline and peak blood pressures were 131/75, and 176/77 respectively. Baseline and peak heart rates were 74 and  134 respectively.  The patient exercised for 2:59 minutes on a standard [place] protocol obtaining target heart rate. Lexiscan/Cardiolyte Nuclear Medicine Report Date of Procedure: 11/9/2020 The patient was injected with 9.4 millicuries (mCi) of Technetium (Tc99m). After an appropriate level of stress the patient was re-injected with 24.2 millicuries (mCi) of Technetium (Tc99m). Repeat gated images were then performed per standard protocol. Findings: 1. Analysis of the the stress and rest images reveals no obvious defects on stress and rest images. 2.  Analysis of the gated images reveals grossly normal left ventricular function with a calculated ejection fraction of 58 %. Impression: Low functional tolerance with symptoms and without EKG changes suggestive of ischemia. There is no obvious infarct or ischemia, with a calculated ejection fraction of   58  %. Suggest: Clinical correlation as patient had significant reported chest pain at low functional tolerance with no significant EKG changes noted. Signed by Dr Oneil Lin on 11/9/2020 8:42 PM      Pertinent Labs:   CBC:   Recent Labs     11/08/20 2342 11/09/20 0436 11/10/20  0203   WBC 13.8* 14.5* 9.7   HGB 14.3 13.7* 13.8*    240 254     BMP:    Recent Labs     11/08/20  2342 11/09/20 0436 11/10/20  0203    141 140   K 3.3* 4.3 4.2    105 104   CO2 25 28 25   BUN 6 6 7   CREATININE 0.8 0.4* 0.8   GLUCOSE 109 91 84     INR: No results for input(s): INR in the last 72 hours. Lipids:   Recent Labs     11/08/20  2342   CHOL 173   HDL 24*     ABGs:No results for input(s): PHART, WMM6YTV, PO2ART, BDC4YCU, BEART, HGBAE, F3EKBBJX, CARBOXHGBART, 02THERAPY in the last 72 hours. HgBA1c:    Recent Labs     11/08/20 2342   LABA1C 5.5       Procedures: Nuclear stress test 11/9/2020  Impression:    Low functional tolerance with symptoms and without EKG changes    suggestive of ischemia. There is no obvious infarct or ischemia, with a calculated ejection    fraction of   58  %.     Suggest: Clinical correlation as patient had significant reported    chest pain at low functional tolerance with no significant EKG changes    noted. Signed by Dr Deanne Adamson on 11/9/2020 8:42 PM            Hospital Course:   18-year-old male with tobacco abuse presented with some exertional chest pain. No ischemic changes noted on EKG or telemetry. Troponin trended and negative. Chest x-ray did show nonspecific infiltrate, however patient with no signs or symptoms of pneumonia and procalcitonin negative, antibiotics discontinued. Patient subsequently underwent stress test on 11/9/2020 which showed no obvious ischemia. Cardiology did discuss possibility of cardiac catheterization, however declined. Patient counseled extensively on smoking cessation. Symptoms otherwise resolved and stable for discharge on 11/10/2020. Call PCP for hospital follow-up appointment this week. Physical Exam:  Vital Signs: /68   Pulse 74   Temp 97.6 °F (36.4 °C) (Temporal)   Resp 18   Ht 5' 10\" (1.778 m)   Wt 174 lb 12.8 oz (79.3 kg)   SpO2 92%   BMI 25.08 kg/m²   General appearance:. Alert and Cooperative   HEENT: Normocephalic. Chest: clear to auscultation bilaterally without wheezes or rhonchi. Cardiac: Normal heart tones with regular rate and rhythm, S1, S2 normal. No murmurs, gallops, or rubs auscultated. Abdomen:soft, non-tender; normal bowel sounds  Extremities: No clubbing or cyanosis. No peripheral edema. Peripheral pulses palpable. Neurologic: Grossly intact. Discharge Medications:       Lilian Mosley   Home Medication Instructions [id number]    Printed on:11/10/20 1002   Medication Information                      acetaminophen (TYLENOL) 325 MG tablet  Take 650 mg by mouth every 6 hours as needed for Pain             cyclobenzaprine (FLEXERIL) 10 MG tablet  Take 1 tablet by mouth 2 times daily as needed for Muscle spasms                 Discharge Instructions: Follow up with NOEMI Valencia. Take medications as directed. Resume activity as tolerated. Disposition: Patient is medically stable and will be discharged home. Time spent on discharge over 30 minutes.     Signed:  Italo Wright MD  11/10/2020 10:02 AM

## 2020-11-11 ENCOUNTER — CARE COORDINATION (OUTPATIENT)
Dept: CASE MANAGEMENT | Age: 50
End: 2020-11-11

## 2020-11-11 LAB
EKG P AXIS: 60 DEGREES
EKG P-R INTERVAL: 126 MS
EKG Q-T INTERVAL: 364 MS
EKG QRS DURATION: 96 MS
EKG QTC CALCULATION (BAZETT): 400 MS
EKG T AXIS: 52 DEGREES
LV EF: 58 %
LVEF MODALITY: NORMAL

## 2020-11-11 NOTE — CARE COORDINATION
[facility] Transitions Initial Follow Up Call    Call within 2 business days of discharge: Yes    Patient: Travis Agarwal Patient : 1970   MRN: [id number]  Reason for Admission: Chest Pain  Discharge Date: 11/10/20 RARS: No data recorded    Last Discharge  [place] [id number]       Complaint Diagnosis Description Type Department Provider    20 Chest Pain Chest pain, unspecified type . .. ED to Hosp-Admission (Discharged) (ADMITTED) L TONY Boykin MD; Chuck Brice. .. Spoke with: [facility] to be reviewed by the provider   Additional needs identified to be addressed with provider No  none    Discussed COVID-19 related testing which was not done at this time. Test results were not done. Patient informed of results, if available? Not done         Method of communication with provider : none    Advance Care Planning:   Does patient have an Advance Directive:  not on file. Was this a readmission? No  Patient stated reason for admission: chest pain  Patients top risk factors for readmission: functional physical ability, medical condition and medication management    Care Transition Nurse (CTN) contacted the patient by telephone to perform post hospital discharge assessment. Verified name and  with patient as identifiers. Provided introduction to self, and explanation of the CTN role. CTN reviewed discharge instructions, medical action plan and red flags with patient who verbalized understanding. Patient given an opportunity to ask questions and does not have any further questions or concerns at this time. Were discharge instructions available to patient? Yes. Reviewed appropriate site of care based on symptoms and resources available to patient including: PCP and Specialist. The patient agrees to contact the PCP office for questions related to their healthcare.      Medication reconciliation was performed with patient, who verbalizes understanding of administration of home medications. Advised obtaining a 90-day supply of all daily and as-needed medications. Covid Risk Education    Patient has following risk factors of: COPD. Education provided regarding infection prevention, and signs and symptoms of COVID-19 and when to seek medical attention with patient who verbalized understanding. Discussed exposure protocols and quarantine From CDC: Are you at higher risk for severe illness?   and given an opportunity for questions and concerns. The patient agrees to contact the COVID-19 hotline [phone] or PCP office for questions related to COVID-19. For more information on steps you can take to protect yourself, see CDC's How to Protect Yourself     Patient/family/caregiver given information for GetWell Loop and agrees to enroll no  Patient's preferred e-mail: declines  Patient's preferred phone number: declines    Discussed follow-up appointments. If no appointment was previously scheduled, appointment scheduling offered: Yes. Is follow up appointment scheduled within 7 days of discharge? Yes  Non-[facility] follow up appointment(s):       Plan for follow-up call in 5-7 days based on severity of symptoms and risk factors. Plan for next call: smoking cessation'  CTN provided contact information for future needs.           Care Transitions 24 Hour Call    Do you have any ongoing symptoms?:  No  Do you have a copy of your discharge instructions?:  Yes  Do you have all of your prescriptions and are they filled?:  No  Have you been contacted by a [address]?:  No  Have you scheduled your follow up appointment?:  Yes  How are you going to get to your appointment?:  Car - family or friend to transport  Were you discharged with any Home Care or Post Acute Services:  No  Do you feel like you have everything you need to keep you well at home?:  Yes  Care Transitions Interventions         Follow Up : Spoke with patient today for COVID initial call after discharge from [facility]. He says he is doing ok, eating and drinking ok. Did review AVS, has his medications, did medication review. He has HFU with PCP on Friday. Discussed smoking cessation with him. He says he had some indigestion last night that went away, says he ate some pizza for supper. He also says he has had some issues with his neck and some nerve damage, and is going to follow up on that. Discussed COVID and CDC guidelines, no s/s of COVID currently. He is accepting of COVID calls. Encouraged to call with prn needs. Will follow up with him at a later time.    Future Appointments   Date Time Provider Chayito Downing   11/13/2020 11:40 AM NOEMI Kim [facility]   12/14/2020 10:30 AM NOEMI Montgomery RMA [facility]   12/29/2020 11:00 AM NOEMI Pompa Cardio [facility]       Henrique Bartlett RN

## 2020-11-13 ENCOUNTER — HOSPITAL ENCOUNTER (OUTPATIENT)
Dept: GENERAL RADIOLOGY | Age: 50
Discharge: HOME | End: 2020-11-13
Payer: COMMERCIAL

## 2020-11-13 ENCOUNTER — OFFICE VISIT (OUTPATIENT)
Dept: FAMILY MEDICINE CLINIC | Age: 50
End: 2020-11-13
Payer: COMMERCIAL

## 2020-11-13 VITALS
WEIGHT: 171 LBS | DIASTOLIC BLOOD PRESSURE: 70 MMHG | HEART RATE: 76 BPM | BODY MASS INDEX: 24.54 KG/M2 | SYSTOLIC BLOOD PRESSURE: 106 MMHG | TEMPERATURE: 97 F | RESPIRATION RATE: 16 BRPM | OXYGEN SATURATION: 99 %

## 2020-11-13 PROCEDURE — 72040 X-RAY EXAM NECK SPINE 2-3 VW: CPT

## 2020-11-13 PROCEDURE — 71046 X-RAY EXAM CHEST 2 VIEWS: CPT

## 2020-11-13 PROCEDURE — 99214 OFFICE O/P EST MOD 30 MIN: CPT | Performed by: NURSE PRACTITIONER

## 2020-11-13 RX ORDER — SERTRALINE HYDROCHLORIDE 25 MG/1
TABLET, FILM COATED ORAL
Qty: 30 TABLET | Refills: 0 | Status: SHIPPED | OUTPATIENT
Start: 2020-11-13 | End: 2021-02-04

## 2020-11-13 RX ORDER — CYCLOBENZAPRINE HCL 10 MG
10 TABLET ORAL 2 TIMES DAILY PRN
Qty: 60 TABLET | Refills: 2 | Status: SHIPPED | OUTPATIENT
Start: 2020-11-13 | End: 2021-07-19 | Stop reason: SDUPTHER

## 2020-11-13 NOTE — PROGRESS NOTES
time  Outpatient Medications Marked as Taking for the 11/13/20 encounter (Office Visit) with NOEMI Millan   Medication Sig Dispense Refill    sertraline (ZOLOFT) 25 MG tablet 1/2 po daily x 3 days then increase to 1 po daily x 2wks then increase to 2 po daily for anxiety  NEVER ABRUPTLY STOP 30 tablet 0    cyclobenzaprine (FLEXERIL) 10 MG tablet Take 1 tablet by mouth 2 times daily as needed for Muscle spasms 60 tablet 2    acetaminophen (TYLENOL) 325 MG tablet Take 650 mg by mouth every 6 hours as needed for Pain          Medications patient taking as of now reconciled against medications ordered at time of hospital discharge: Yes    Chief Complaint   Patient presents with    Follow-Up from Hospital     chest, shoulder, and neck pain       HPI    Had onset of left neck and shoulder pain and tx with tylenol. Pt then began having pain to left chest wall and area and decided to go to ER. EMS gave pt NTG and no change. No associated weakness or nausea or dizziness. Hospital notes do show the following:  Hospital Course:   71-year-old male with tobacco abuse presented with some exertional chest pain. No ischemic changes noted on EKG or telemetry. Troponin trended and negative. Chest x-ray did show nonspecific infiltrate, however patient with no signs or symptoms of pneumonia and procalcitonin negative, antibiotics discontinued. Patient subsequently underwent stress test on 11/9/2020 which showed no obvious ischemia. Cardiology did discuss possibility of cardiac catheterization, however declined. Patient counseled extensively on smoking cessation. Symptoms otherwise resolved and stable for discharge on 11/10/2020. Call PCP for hospital follow-up appointment this week. Pt does feel that his symptoms to which he sought care have improved somewhat. Pt has also expressed the desire to stop smoking.   He does not wish to have smoking cessation medication, however he states his anxiety gets really high when he tries to stop smoking and feels that that is his biggest obstacle. Patient and I have discussed treatment options and he is in agreement to begin sertraline. We have discussed possible side effects. Patient is also stating concern that his problems may be coming from his cervical spine. Patient states that his neck has continued to give him problems over the years and seems to be worsening. We do have on file 2018 cervical spine MRI that is showing spondylosis and postsurgical changes. Inpatient course: Discharge summary reviewed- see chart. Interval history/Current status: improving    Review of Systems   Constitutional: Negative for fever. Respiratory: Negative for shortness of breath. Cardiovascular: Positive for chest pain. Negative for palpitations. Gastrointestinal: Negative for vomiting. Musculoskeletal: Positive for neck pain. Neurological: Negative for weakness. Vitals:    11/13/20 1139   BP: 106/70   Site: Left Upper Arm   Position: Sitting   Cuff Size: Large Adult   Pulse: 76   Resp: 16   Temp: 97 °F (36.1 °C)   TempSrc: Temporal   SpO2: 99%   Weight: 171 lb (77.6 kg)     Body mass index is 24.54 kg/m². Wt Readings from Last 3 Encounters:   11/13/20 171 lb (77.6 kg)   11/09/20 174 lb 12.8 oz (79.3 kg)   10/14/20 171 lb (77.6 kg)     BP Readings from Last 3 Encounters:   11/13/20 106/70   11/10/20 101/68   10/14/20 134/86       Physical Exam  Vitals signs and nursing note reviewed. Constitutional:       General: He is not in acute distress. Appearance: Normal appearance. He is well-developed and normal weight. He is not ill-appearing, toxic-appearing or diaphoretic. HENT:      Head: Normocephalic and atraumatic. Eyes:      Extraocular Movements: Extraocular movements intact. Conjunctiva/sclera: Conjunctivae normal.      Pupils: Pupils are equal, round, and reactive to light. Neck:      Musculoskeletal: Neck supple. No neck rigidity.       Trachea: No

## 2020-11-18 ENCOUNTER — CARE COORDINATION (OUTPATIENT)
Dept: CASE MANAGEMENT | Age: 50
End: 2020-11-18

## 2020-11-23 ASSESSMENT — ENCOUNTER SYMPTOMS
SHORTNESS OF BREATH: 0
VOMITING: 0

## 2020-11-25 ENCOUNTER — CARE COORDINATION (OUTPATIENT)
Dept: CASE MANAGEMENT | Age: 50
End: 2020-11-25

## 2020-11-25 NOTE — CARE COORDINATION
[facility] Transitions Follow Up Call    2020    Patient: Chacorta De Souza  Patient : 1970   MRN: [id number]  Reason for Admission:   Discharge Date: 11/10/20 RARS: No data recorded       Spoke with: N/A    Care Transitions Subsequent and Final Call    Subsequent and Final Calls  Care Transitions Interventions  Other Interventions: Follow Up ; Attempted to make contact with Tonja Rasheed for Federica final follow up call post discharge from the hospital without success. Unable to leave a message regarding intent of call and call back information. Will resolve calls at this time.      Future Appointments   Date Time Provider Chayito Downing   2020 10:30 AM NOEMI Alejandro RMA [facility]   2020 11:00 AM NOEMI Baez Cardio [facility] Hong RN

## 2020-12-14 ENCOUNTER — OFFICE VISIT (OUTPATIENT)
Dept: CARDIOLOGY | Age: 50
End: 2020-12-14
Payer: COMMERCIAL

## 2020-12-14 VITALS
WEIGHT: 179 LBS | HEIGHT: 70 IN | BODY MASS INDEX: 25.62 KG/M2 | OXYGEN SATURATION: 99 % | DIASTOLIC BLOOD PRESSURE: 72 MMHG | SYSTOLIC BLOOD PRESSURE: 112 MMHG | HEART RATE: 97 BPM

## 2020-12-14 PROBLEM — R07.89 OTHER CHEST PAIN: Status: ACTIVE | Noted: 2020-11-09

## 2020-12-14 PROBLEM — I25.9 CHEST PAIN DUE TO MYOCARDIAL ISCHEMIA: Status: RESOLVED | Noted: 2020-11-09 | Resolved: 2020-12-14

## 2020-12-14 PROCEDURE — 99406 BEHAV CHNG SMOKING 3-10 MIN: CPT | Performed by: CLINICAL NURSE SPECIALIST

## 2020-12-14 PROCEDURE — 99213 OFFICE O/P EST LOW 20 MIN: CPT | Performed by: CLINICAL NURSE SPECIALIST

## 2020-12-14 PROCEDURE — 93000 ELECTROCARDIOGRAM COMPLETE: CPT | Performed by: CLINICAL NURSE SPECIALIST

## 2020-12-14 ASSESSMENT — ENCOUNTER SYMPTOMS
FACIAL SWELLING: 0
WHEEZING: 0
NAUSEA: 0
CHEST TIGHTNESS: 0
ABDOMINAL PAIN: 0
EYE REDNESS: 0
VOMITING: 0
COUGH: 0
SHORTNESS OF BREATH: 0

## 2020-12-14 NOTE — PATIENT INSTRUCTIONS
Return in about 6 months (around 6/14/2021) for Dr. Natalie Monroy. Quit smoking. Call the [address] [place] [address] 800-QUIT-NOW  Get back on Fish Oil twice a day OTC      Patient Education        Stopping Smoking: Care Instructions  Your Care Instructions     Cigarette smokers crave the nicotine in cigarettes. Giving it up is much harder than simply changing a habit. Your body has to stop craving the nicotine. It is hard to quit, but you can do it. There are many tools that people use to quit smoking. You may find that combining tools works best for you. There are several steps to quitting. First you get ready to quit. Then you get support to help you. After that, you learn new skills and behaviors to become a nonsmoker. For many people, a necessary step is getting and using medicine. Your doctor will help you set up the plan that best meets your needs. You may want to attend a smoking cessation program to help you quit smoking. When you choose a program, look for one that has proven success. Ask your doctor for ideas. You will greatly increase your chances of success if you take medicine as well as get counseling or join a cessation program.  Some of the changes you feel when you first quit tobacco are uncomfortable. Your body will miss the nicotine at first, and you may feel short-tempered and grumpy. You may have trouble sleeping or concentrating. Medicine can help you deal with these symptoms. You may struggle with changing your smoking habits and rituals. The last step is the tricky one: Be prepared for the smoking urge to continue for a time. This is a lot to deal with, but keep at it. You will feel better. Follow-up care is a key part of your treatment and safety. Be sure to make and go to all appointments, and call your doctor if you are having problems. It's also a good idea to know your test results and keep a list of the medicines you take. How can you care for yourself at home? · Ask your family, friends, and coworkers for support. You have a better chance of quitting if you have help and support. · Join a support group, such as Nicotine Anonymous, for people who are trying to quit smoking. · Consider signing up for a smoking cessation program, such as the American Lung Association's Freedom from Smoking program.  · Get text messaging support. Go to the website at www.smokefree. gov to sign up for the [facility] program.  · Set a quit date. Pick your date carefully so that it is not right in the middle of a big deadline or stressful time. Once you quit, do not even take a puff. Get rid of all ashtrays and lighters after your last cigarette. Clean your house and your clothes so that they do not smell of smoke. · Learn how to be a nonsmoker. Think about ways you can avoid those things that make you reach for a cigarette. ? Avoid situations that put you at greatest risk for smoking. For some people, it is hard to have a drink with friends without smoking. For others, they might skip a coffee break with coworkers who smoke. ? Change your daily routine. Take a different route to work or eat a meal in a different place. · Cut down on stress. Calm yourself or release tension by doing an activity you enjoy, such as reading a book, taking a hot bath, or gardening. · Talk to your doctor or pharmacist about nicotine replacement therapy, which replaces the nicotine in your body. You still get nicotine but you do not use tobacco. Nicotine replacement products help you slowly reduce the amount of nicotine you need. These products come in several forms, many of them available over-the-counter:  ? Nicotine patches  ? Nicotine gum and lozenges  ? Nicotine inhaler  · Ask your doctor about bupropion (Wellbutrin) or varenicline (Chantix), which are prescription medicines. They do not contain nicotine. They help you by reducing withdrawal symptoms, such as stress and anxiety. · Some people find hypnosis, acupuncture, and massage helpful for ending the smoking habit. · Eat a healthy diet and get regular exercise. Having healthy habits will help your body move past its craving for nicotine. · Be prepared to keep trying. Most people are not successful the first few times they try to quit. Do not get mad at yourself if you smoke again. Make a list of things you learned and think about when you want to try again, such as next week, next month, or next year. Where can you learn more? Go to https://[employer]. org and sign in to your [employer] account. Enter I454 in the [employer] box to learn more about \"Stopping Smoking: Care Instructions. \"     If you do not have an account, please click on the \"Sign Up Now\" link. Current as of: March 12, 2020               Content Version: 12.6  © 0649-9266 [employer]. Care instructions adapted under license by [facility] ([facility]). If you have questions about a medical condition or this instruction, always ask your healthcare professional. [address] any warranty or liability for your use of this information.

## 2020-12-14 NOTE — PROGRESS NOTES
chest pain 11/09/2020    Chronic left shoulder pain 10/16/2019    Chronic left-sided thoracic back pain 01/29/2019    Left groin mass     Chronic pain of both shoulders 12/18/2018    CTS (carpal tunnel syndrome) 11/12/2018    Numbness     Lumbar radiculopathy 09/12/2018    Sacroiliitis ([facility] 06/12/2018    Myofascial pain 06/12/2018    Lumbar facet arthropathy 04/25/2018    Lumbar facet arthropathy 01/12/2018    Cervical disc disorder with radiculopathy 10/25/2017    Osteoarthritis of spine with radiculopathy, cervical region 03/02/2017    Chronic back pain     Chronic neck pain     MVP (mitral valve prolapse)     COPD (chronic obstructive pulmonary disease) ([facility])      Past Medical History:   Diagnosis Date    Arthritis     Chronic back pain     sciatica    COPD (chronic obstructive pulmonary disease) ([facility]     Neck pain     HX vertebral fx with hardware     Past Surgical History:   Procedure Laterality Date    ABDOMEN SURGERY Left 1/10/2019    EXCISION MASS GROIN performed by Abhay Pierre MD at [address] N/A 3/1/2017    ACDF C5-6, C6-7 WITH ALLOGRAFT BONE AND ANTERIOR CERVICAL PLATING  performed by Jaquan Chapman DO at [address] COLONOSCOPY N/A 4/10/2018    Dr Xena Kincaid AP (-) dysplasia x 1--5 yr recall    NECK SURGERY      TONSILLECTOMY AND ADENOIDECTOMY       Family History   Problem Relation Age of Onset    Cancer Mother         breast     Cancer Maternal Grandmother         breast     Cancer Father         colon     Social History     Tobacco Use    Smoking status: Current Every Day Smoker     Packs/day: 1.50     Years: 30.00     Pack years: 45.00     Types: Cigarettes    Smokeless tobacco: Never Used   Substance Use Topics    Alcohol use: Yes     Comment: occasionally      Current Outpatient Medications   Medication Sig Dispense Refill    cyclobenzaprine (FLEXERIL) 10 MG tablet Take 1 tablet by mouth 2 times daily as needed for Muscle spasms 60 tablet 2    acetaminophen (TYLENOL) 325 MG tablet Take 650 mg by mouth every 6 hours as needed for Pain      sertraline (ZOLOFT) 25 MG tablet 1/2 po daily x 3 days then increase to 1 po daily x 2wks then increase to 2 po daily for anxiety  NEVER ABRUPTLY STOP (Patient not taking: Reported on 12/14/2020) 30 tablet 0     No current facility-administered medications for this visit. Allergies: Pcn [penicillins]    Review of Systems  Review of Systems   Constitutional: Negative for activity change, diaphoresis, fatigue, fever and unexpected weight change. HENT: Negative for facial swelling and nosebleeds. Eyes: Negative for redness and visual disturbance. Respiratory: Negative for cough, chest tightness, shortness of breath and wheezing. Cardiovascular: Negative for chest pain, palpitations and leg swelling. Gastrointestinal: Negative for abdominal pain, nausea and vomiting. Endocrine: Negative for cold intolerance and heat intolerance. Genitourinary: Negative for dysuria and hematuria. Musculoskeletal: Negative for arthralgias and myalgias. Chronic left arm, neck pain   Skin: Negative for pallor and rash. Neurological: Negative for dizziness, seizures, syncope, weakness and light-headedness. Hematological: Does not bruise/bleed easily. Psychiatric/Behavioral: Negative for agitation. The patient is not nervous/anxious. Objective  Vital Signs - /72   Pulse 97   Ht 5' 10\" (1.778 m)   Wt 179 lb (81.2 kg)   SpO2 99%   BMI 25.68 kg/m²   Physical Exam  Vitals signs and nursing note reviewed. Constitutional:       General: He is not in acute distress. Appearance: Normal appearance. He is well-developed. He is not diaphoretic. HENT:      Head: Normocephalic and atraumatic. Right Ear: Hearing and external ear normal.      Left Ear: Hearing and external ear normal.      Nose: Nose normal.   Eyes:      General:         Right eye: No discharge. Left eye: No discharge. Pupils: Pupils are equal, round, and reactive to light. Neck:      Musculoskeletal: Neck supple. No muscular tenderness. Thyroid: No thyromegaly. Vascular: No carotid bruit or JVD. Trachea: No tracheal deviation. Cardiovascular:      Rate and Rhythm: Normal rate and regular rhythm. Heart sounds: Normal heart sounds. No murmur. No friction rub. No gallop. Pulmonary:      Effort: Pulmonary effort is normal. No respiratory distress. Breath sounds: Normal breath sounds. No wheezing or rales. Abdominal:      Palpations: Abdomen is soft. Tenderness: There is no abdominal tenderness. Musculoskeletal:         General: No swelling or deformity. Right lower leg: No edema. Left lower leg: No edema. Comments: Normal gait and station   Skin:     General: Skin is warm and dry. Findings: No rash. Neurological:      General: No focal deficit present. Mental Status: He is alert and oriented to person, place, and time. Cranial Nerves: No cranial nerve deficit. Psychiatric:         Mood and Affect: Mood normal.         Behavior: Behavior normal.         Judgment: Judgment normal.         Data:  Shawna 11/8/20  Impression:   Low functional tolerance with symptoms and without EKG changes   suggestive of ischemia. There is no obvious infarct or ischemia, with a calculated ejection   fraction of   58  %. Suggest: Clinical correlation as patient had significant reported   chest pain at low functional tolerance with no significant EKG changes   noted.    Signed by Dr Bogdan Isabel on 11/9/2020 8:42 PM     Lab Results   Component Value Date    CHOL 173 11/08/2020    TRIG 494 (H) 11/08/2020    HDL 24 (L) 11/08/2020    LDLCALC see below 11/08/2020    LDLDIRECT 103 11/08/2020     Lab Results   Component Value Date    ALT 10 11/08/2020    AST 10 11/08/2020     EKG shows normal sinus rhythm rate 98 incomplete right bundle branch block, unchanged compared to EKG dated 11/9/2020    Assessment:     Diagnosis Orders   1. Other chest pain     2. Chronic neck pain     3. Chronic left shoulder pain     4. Chronic low back pain, unspecified back pain laterality, unspecified whether sciatica present     5. Cigarette nicotine dependence without complication     6. Mixed hyperlipidemia         Chest painwith recent hospitalization and negative nuclear stress test, but low functional tolerance. No further significant issues. He has chronic neck, shoulder, and back pain. Nicotine dependenceInstructed patient in smoking cessation rationales, strategies, and available resources x 3 minutes. We discussed that from a prevention standpoint for cardiovascular disease, smoking cessation is one of the best things he can do. He is trying to taper back. His PCP has given him low-dose Zoloft she has not yet started. Hyperlipidemiasignificantly elevated triglycerides. Encouraged him to get back on OTC fish oil which she has taken in the past.  Normal A1c and no diabetes diagnosis. Stable cardiovascular status. No evidence of overt heart failure,angina or dysrhythmia. Plan    Orders Placed This Encounter   Procedures    EKG 12 lead     Order Specific Question:   Reason for Exam?     Answer:   Chest pain     Return in about 6 months (around 6/14/2021) for Dr. Tahmina العراقي. Quit smoking. Call the [address] [place] [address] [phone]  Get back on Fish Oil twice a day OTC    Call with any questionsor concerns  Follow up with NOEMI Granda for non cardiac problems  Report any new problems  Cardiovascular Fitness-Exercise as tolerated. Strive for 15 minutes of exercise most days of the week. Cardiac / HealthyDiet  Continue current medications as directed  Continue plan of treatment  It is always recommended that you bring your medicationsbottles with you to each visit - this is for your safety!        NOEMI Diaz

## 2021-02-04 ENCOUNTER — OFFICE VISIT (OUTPATIENT)
Dept: NEUROSURGERY | Age: 51
End: 2021-02-04
Payer: COMMERCIAL

## 2021-02-04 VITALS
HEIGHT: 70 IN | WEIGHT: 186 LBS | BODY MASS INDEX: 26.63 KG/M2 | SYSTOLIC BLOOD PRESSURE: 139 MMHG | DIASTOLIC BLOOD PRESSURE: 87 MMHG | OXYGEN SATURATION: 98 % | HEART RATE: 86 BPM

## 2021-02-04 DIAGNOSIS — R29.2 HYPERREFLEXIA: ICD-10-CM

## 2021-02-04 DIAGNOSIS — R20.0 NUMBNESS AND TINGLING IN LEFT HAND: ICD-10-CM

## 2021-02-04 DIAGNOSIS — M54.2 NECK PAIN: Primary | ICD-10-CM

## 2021-02-04 DIAGNOSIS — R20.2 NUMBNESS AND TINGLING IN LEFT HAND: ICD-10-CM

## 2021-02-04 DIAGNOSIS — M54.50 CHRONIC MIDLINE LOW BACK PAIN WITHOUT SCIATICA: ICD-10-CM

## 2021-02-04 DIAGNOSIS — G89.29 CHRONIC MIDLINE LOW BACK PAIN WITHOUT SCIATICA: ICD-10-CM

## 2021-02-04 DIAGNOSIS — M79.602 LEFT ARM PAIN: ICD-10-CM

## 2021-02-04 DIAGNOSIS — Z98.1 HISTORY OF FUSION OF CERVICAL SPINE: ICD-10-CM

## 2021-02-04 PROCEDURE — 99214 OFFICE O/P EST MOD 30 MIN: CPT | Performed by: NURSE PRACTITIONER

## 2021-02-04 RX ORDER — TRAMADOL HYDROCHLORIDE 50 MG/1
50 TABLET ORAL 2 TIMES DAILY PRN
Qty: 28 TABLET | Refills: 0 | Status: SHIPPED | OUTPATIENT
Start: 2021-02-04 | End: 2021-02-18

## 2021-02-04 NOTE — PROGRESS NOTES
[address] [facility] Neurosurgery  Follow up Office Visit        Chief Complaint   Patient presents with    Follow-up    Neck Pain    Arm Pain    Back Pain       HISTORY OF PRESENT ILLNESS:      Sulma Dent is a 53 y.o. 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 Prior to surgery he complained of LUE severe arm pain. The arm pain was gone, however, his neck pain remained. Today he returns to clinic with complaints of neck and left arm pain. The pain will radiate into the left shoulder, triceps, forearm. He will have numbness of the left first 3 digits. Over the last few months he states the pain has worsened. He mentions that he was having significant chest pain about 3 months ago, actually went to our ED and MI was ruled out. He mentions having low back pain as well. He has tried Tylenol and muscle relaxers for pain. Has not tried therapy. Past Medical History:   Diagnosis Date    Arthritis     Chronic back pain     sciatica    COPD (chronic obstructive pulmonary disease) (HCC)     Neck pain     HX vertebral fx with hardware       Past Surgical History:   Procedure Laterality Date    ABDOMEN SURGERY Left 1/10/2019    EXCISION MASS GROIN performed by Emerson Hawley MD at [address] N/A 3/1/2017    ACDF C5-6, C6-7 WITH ALLOGRAFT BONE AND ANTERIOR CERVICAL PLATING  performed by Layla Willson DO at [address] [place] [address] N/A 4/10/2018    Dr Halima Sosa AP (-) dysplasia x 1--5 yr recall    NECK SURGERY      TONSILLECTOMY AND ADENOIDECTOMY          Medications    Current Outpatient Medications:     traMADol (ULTRAM) 50 MG tablet, Take 1 tablet by mouth 2 times daily as needed for Pain for up to 14 days. , Disp: 28 tablet, Rfl: 0    cyclobenzaprine (FLEXERIL) 10 MG tablet, Take 1 tablet by mouth 2 times daily as needed for Muscle spasms, Disp: 60 tablet, Rfl: 2    acetaminophen (TYLENOL) 325 MG tablet, Take 650 mg by mouth every 6 hours as needed for Pain, Disp: , Rfl:   Pcn [penicillins]    Social History  Social History     Tobacco Use   Smoking Status Current Every Day Smoker    Packs/day: 1.50    Years: 30.00    Pack years: 45.00    Types: Cigarettes   Smokeless Tobacco Never Used     Social History     Substance and Sexual Activity   Alcohol Use Yes    Comment: occasionally         Family History   Problem Relation Age of Onset    Cancer Mother         breast     Cancer Maternal Grandmother         breast     Cancer Father         colon       Review of Systems   Constitutional: Negative. HENT: Negative. Negative for congestion, ear discharge, ear pain, hearing loss, nosebleeds, sore throat and tinnitus. Eyes: Negative. Respiratory: Negative. Negative for stridor. Cardiovascular: Negative. Gastrointestinal: Negative. Genitourinary: Negative. Musculoskeletal: Positive for myalgias and neck pain. Negative for back pain, falls and joint pain. Skin: Negative. Neurological: Positive for tingling. Negative for dizziness, tremors, sensory change, speech change, focal weakness, seizures, loss of consciousness and headaches. Endo/Heme/Allergies: Negative. Psychiatric/Behavioral: Negative. PHYSICAL EXAM:  Vitals:    02/04/21 1046   BP: 139/87   Pulse: 86   SpO2: 98%     Constitutional: The patient appears well-developed and well-nourished. Eyes  conjunctiva normal.  Conjugate gaze  Ear, nose, throat -No scars, masses, or lesions over external nose or ears, no atrophy of tongue  Neck-symmetric, no masses noted, no jugular vein distension  Respiration- chest wall appears symmetric, good expansion, normal effort without use of accessory muscles  Musculoskeletal  no significant wasting of muscles noted, no bony deformities, gait no gross ataxia  Extremities-no clubbing, cyanosis or edema  Skin  warm, dry, and intact. No rash, erythema, or pallor.   Psychiatric  mood, affect, and behavior appear normal.     Neurologic Examination  Awake, Alert and oriented x 4  Normal speech pattern, following commands    Motor:  RIGHT: hand grasp 5/5    finger extension 5/5    bicep 5/5    triceps 5/5    deltoid 5/5    LEFT:   hand grasp 5/5    finger extension 5/5    bicep 5/5    triceps 5/5    deltoid 5/5    No deficits to light touch or pinprick sensation  Reflexes are 3+ left bicep  No clonus or Hoffmans sign  No myofacial tenderness to palpation  Normal Gait pattern      Wound:  Healed well    DATA and IMAGING:    Lab Results   Component Value Date    WBC 9.7 11/10/2020    HGB 13.8 (L) 11/10/2020    HCT 41.9 (L) 11/10/2020    MCV 89.1 11/10/2020     11/10/2020     Lab Results   Component Value Date     11/10/2020    K 4.2 11/10/2020     11/10/2020    CO2 25 11/10/2020    BUN 7 11/10/2020    CREATININE 0.8 11/10/2020    GLUCOSE 84 11/10/2020    CALCIUM 9.2 11/10/2020    PROT 6.7 11/08/2020    LABALBU 3.8 11/08/2020    BILITOT <0.2 11/08/2020    ALKPHOS 87 11/08/2020    AST 10 11/08/2020    ALT 10 11/08/2020    LABGLOM >60 11/10/2020    GFRAA >59 11/10/2020    GLOB 2.8 03/02/2017     Lab Results   Component Value Date    INR 0.92 02/15/2017    PROTIME 12.4 02/15/2017    No results found. Narrative   Examination. XR CERVICAL SPINE (2-3 VIEWS) 11/13/2020 11:33 AM   History: Chronic neck pain. The frontal and lateral views of the cervical spine are compared with   the previous study dated 9/12/2017. No significant interval change. The hardware fusion of C5, C6 and C7 is noted which is unchanged. No   hardware complication. There is complete bony fusion. There is hardware internal fixation of C2 with the metallic pain. No   change in position of the heart. . No complication. Normal alignment. There is moderate diffuse osteopenia. Chronic degenerative changes are seen in the form of osteophytes. Posterior processes are intact.  Prevertebral soft tissues are normal.       Impression   No acute bony abnormality. Hardware fusion and internal fixation as detailed above. No hardware   complication. A moderate diffuse osteopenia. Signed by Dr Cinthya Dela Cruz on 11/13/2020 1:02 PM   I have personally reviewed the images and my interpretation is:  Hardware stable, good bony fusion noted at C5-6, C6-7      ASSESSMENT   Brian Isbell is a 40 y.o. 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 PLAN:  -Start PT  -Will try tramadol   -Follow up after therapy        ICD-10-CM    1. Neck pain  M54.2 [facility]     traMADol (ULTRAM) 50 MG tablet   2. Left arm pain  M79.602 [facility]     traMADol (ULTRAM) 50 MG tablet   3. Numbness and tingling in left hand  R20.0 [facility] Physical Therapy    R20.2    4. Hyperreflexia  R29.2 [facility] Physical [facility]   5. History of fusion of cervical spine  Z98.1 [facility] Physical Therapy   6.  Chronic midline low back pain without sciatica  M54.5 [facility]    G89.29         NOEMI Rivera

## 2021-04-30 ENCOUNTER — TELEPHONE (OUTPATIENT)
Dept: NEUROSURGERY | Age: 51
End: 2021-04-30

## 2021-05-17 ENCOUNTER — HOSPITAL ENCOUNTER (OUTPATIENT)
Dept: PHYSICAL THERAPY | Age: 51
Setting detail: THERAPIES SERIES
Discharge: HOME | End: 2021-05-17
Payer: COMMERCIAL

## 2021-05-17 PROCEDURE — 97162 PT EVAL MOD COMPLEX 30 MIN: CPT

## 2021-05-17 ASSESSMENT — PAIN DESCRIPTION - ORIENTATION: ORIENTATION: RIGHT;LEFT

## 2021-05-17 ASSESSMENT — PAIN - FUNCTIONAL ASSESSMENT: PAIN_FUNCTIONAL_ASSESSMENT: PREVENTS OR INTERFERES SOME ACTIVE ACTIVITIES AND ADLS

## 2021-05-17 ASSESSMENT — PAIN DESCRIPTION - PAIN TYPE: TYPE: CHRONIC PAIN

## 2021-05-18 ASSESSMENT — PAIN DESCRIPTION - LOCATION: LOCATION: BACK;NECK

## 2021-05-18 ASSESSMENT — PAIN SCALES - GENERAL: PAINLEVEL_OUTOF10: 3

## 2021-05-18 ASSESSMENT — PAIN DESCRIPTION - PAIN TYPE: TYPE: CHRONIC PAIN

## 2021-05-18 NOTE — PROGRESS NOTES
Physical Therapy  Initial Assessment  Date: 2021  Patient Name: Sundar De Jesus  MRN: [id number]  : 1970     Treatment Diagnosis: Neck and LUE pain    Restrictions       Subjective   General  Chart Reviewed: Yes  Patient assessed for rehabilitation services?: Yes  Additional Pertinent Hx: 52 y/o M presents with complaint of neck and LUE pain. PMH includes ACDF C5-6, C6-7, COPD, hx vertebral fx  Response To Previous Treatment: Not applicable  Family / Caregiver Present: No  Referring Practitioner: NOEMI Easley  Referral Date : 21  Diagnosis: Neck and LUE pain  Follows Commands: Within Functional Limits  PT Visit Information  PT Insurance Information: [facility] plan  Total # of Visits Approved:  (Anticipate 8-12)  Total # of Visits to Date: 1  Plan of Care/Certification Expiration Date: 08/15/21  Progress Note Due Date: 21  Subjective  Subjective: Pt presents with complaint of neck pain that also radiates into L upper trap and LUE. He had an ACDF in  due to similar symptoms. Had some relief of symptoms (after about a year) but did get better. Symptoms started to return last year, and he had a severe episode in 2020 (went to hospital due to chest and LUE pain- cardiac testing clear. ). Pain is constant at 3-4/10. He has pain and constant tingling in L delt and triceps area. Occasionally has radiation into forearm, hand, and first 3 fingers. Pain increased by overhead reaching with LUE, leaning with weight shifted to L, reading, and generally with any repetitive activity. It relieved to a degree by laying down and using ice packs. Pain Screening  Patient Currently in Pain: Yes  Pain Assessment  Pain Assessment: 0-10  Pain Level: 3  Pain Type: Chronic pain  Pain Location: Back;Neck  Pain Orientation: Right;Left  Pain Descriptors: Constant; Tingling;Pins and needles; Shooting;Radiating  Pain Frequency: Continuous  Functional Pain Assessment: Prevents or interferes some active activities and ADLs  Vital Signs  Patient Currently in Pain: Yes    Vision/Hearing  Vision  Vision: Within Functional Limits  Hearing  Hearing: Within functional limits    Orientation  Orientation  Overall Orientation Status: Within Normal Limits    Social/Functional History  Social/Functional History  ADL Assistance: Independent  Active : Yes  Occupation: Full time employment  Type of occupation: ,   Leisure & Hobbies: Computer games, reading    Objective     Observation/Palpation  Palpation: Increased muscle tension throughout bilat neck, upper traps, cervical paraspinals, suboccipitals. Point tender at L side, but no radiation with palpation. Pain and increase of symptoms with palpation of L 1st rib, which is hypomobile. Observation: Sits with slouched posture, slumped shoulders, and mild fwd head. Frequently moves and shakes LUE d/t c/o \"pins and needles. \"    Spine  Cervical: 30 degrees flex, 20 degrees ext, 25 degrees SB R, 15 degrees SB L, 75% rotation bilaterally. Strength RUE  R Shoulder Flexion: 5/5  R Shoulder ABduction: 5/5  R Elbow Flexion: 5/5  R Elbow Extension: 5/5  R Forearm Pron: 5/5  R Forearm Sup: 5/5  R Wrist Flexion: 5/5  R Wrist Extension: 5/5  Strength LUE  L Shoulder Flexion: 5/5  L Shoulder ABduction: 5/5  L Elbow Flexion: 5/5  L Elbow Extension: 5/5  L Forearm Pron: 5/5  L Forearm Sup: 5/5 (5-/5)  L Wrist Flexion: 5/5  L Wrist Extension: 5/5 (5-/5)  Strength Other  Other: L  slightly weaker than right but both WFL. (Dynamometer unavailable)  Motor Control  Gross Motor?: WNL  Additional Measures  Special Tests: (+)Spurling L, (-)Spurling R.  (-)Tinel L. Other: Neck Pain Disability Index: 42% impairment, QuickDASH: 31.8% impairment  Sensation  Overall Sensation Status: WFL (WFL to light touch, but \"pins and needles\" in LUE at C6 dermatomal level. RUE WNL. )                   Exercises  Exercise 1: NO ICT d/t hx of goal 2: Improve cervical ROM to at least 45 degrees flex, 40 degrees ext, 100% rotation bilaterally. Long term goals  Time Frame for Long term goals : 4-6 weeks  Long term goal 1: Lessen slumped posture and fwd head. Long term goal 2: Decrease frequency and intensity of LUE radicular symptoms. Long term goal 3: Report less pain with repetitive reaching activities required for job. Long term goal 4: Improve NPDI score to 25% impairment or less. Long term goal 5: Improve QuickDASH score to 25% impairment or less.   Patient Goals   Patient goals : reduce neck and LUE pain       Therapy Time   Individual Concurrent Group Co-treatment   Time In 1612         Time Out 1640         Minutes [facility] Dani Rojo, PT

## 2021-05-24 ENCOUNTER — HOSPITAL ENCOUNTER (OUTPATIENT)
Dept: PHYSICAL THERAPY | Age: 51
Setting detail: THERAPIES SERIES
Discharge: HOME | End: 2021-05-24
Payer: COMMERCIAL

## 2021-05-24 PROCEDURE — 97110 THERAPEUTIC EXERCISES: CPT

## 2021-05-24 ASSESSMENT — PAIN DESCRIPTION - LOCATION: LOCATION: ARM;NECK

## 2021-05-24 ASSESSMENT — PAIN DESCRIPTION - PAIN TYPE: TYPE: CHRONIC PAIN

## 2021-05-24 NOTE — PROGRESS NOTES
Physical Therapy  Daily Treatment Note  Date: 2021  Patient Name: Tish Thornton  MRN: [id number]     :   1970    Subjective:   General  Additional Pertinent Hx: 54 y/o M presents with complaint of neck and LUE pain.   PMH includes ACDF C5-6, C6-7, COPD, hx vertebral fx  Referring Practitioner: NOEMI Draper  PT Insurance Information: [facility] [employer] plan  Total # of Visits Approved:  (8-12)  Total # of Visits to Date: 2  Plan of Care/Certification Expiration Date: 08/15/21  Progress Note Due Date: 21  Subjective: my neck and L arm are still hurting  Patient Currently in Pain: Yes  Pain Level: 3  Pain Type: Chronic pain  Pain Location: Arm;Neck  Pain Orientation: Left  Pain Descriptors: Radiating;Aching       Treatment Activities:          Exercises  Exercise 1: NO ICT d/t hx of ACDF  Exercise 2: CROM  3\" x 10 ea  Exercise 3: Bilat upper trap stretch  10\" x 3  Exercise 4: Bilat levator stretch 10\" x 3  Exercise 5: Bilat scalene stretch  10\" x 3  Exercise 6: Chin tucks  3\" x 10  Exercise 7: Slouch with overcorrection  x 10  Exercise 8: Backwards shoulders rolls/shrugs  x 10  Exercise 9: Fwd bent rows  x 10  Exercise 10: Pec stretch on doorway  10\" x 3 ea  Exercise 11: Scapular retraction with t-band  yellow  x 10  Exercise 12: Standing Is, Ts, Ys  Yellow x 10 ea  Exercise 13: Lat pulldowns  50# (nautalus)  x 10  Exercise 14: Rhomboid stretch on pole  10\" x 3  Exercise 15: Rolling t-ball up wall (upper thoracic stretch)  5\" x 5  Exercise 16: Median/radial nerve glides (in Paperlite chart)--3 reps radial and 3 median with wrist and elbow  Exercise 17: Supine first rib mobilizations  x 3  Exercise 18: Supine on towel/short thoracic pad  x 2'  Exercise 19: Suboccipital release/STM/Gentle manual traction  x 8' total  Exercise 20: U/S vs IFC PRN for elevated pain--not today        Assessment:   Conditions Requiring Skilled Therapeutic Intervention  Body structures, Functions, Activity limitations: Decreased ROM; Decreased strength;Decreased posture; Increased pain;Decreased sensation  Assessment: Initiated POC per initial evaluation, as session progressed so did level of discomfort into LUE and cautioned him not to go into the painful ROM. Trigger point in L scalenes performing STM and had several tender areas in his cervical musculature and suboccipital region. Treatment Diagnosis: Neck and LUE pain  REQUIRES PT FOLLOW UP: Yes             Goals:  Short term goals  Time Frame for Short term goals: 3-4 weeks  Short term goal 1: Independent with HEP  Short term goal 2: Improve cervical ROM to at least 45 degrees flex, 40 degrees ext, 100% rotation bilaterally. Long term goals  Time Frame for Long term goals : 4-6 weeks  Long term goal 1: Lessen slumped posture and fwd head. Long term goal 2: Decrease frequency and intensity of LUE radicular symptoms. Long term goal 3: Report less pain with repetitive reaching activities required for job. Long term goal 4: Improve NPDI score to 25% impairment or less. Long term goal 5: Improve QuickDASH score to 25% impairment or less.   Patient Goals   Patient goals : reduce neck and LUE pain    Plan:    Times per week: 2x  Plan weeks: 4-6 weeks  Current Treatment Recommendations: Strengthening, ROM, Manual Therapy - Soft Tissue Mobilization, Home Exercise Program, Patient/Caregiver Education & Training, Modalities, Manual Therapy - Joint Manipulation        Therapy Time   Individual Concurrent Group Co-treatment   Time In 9667         Time Out 1450         Minutes [address] PTA    Electronically signed by Sridevi Cheng PTA on 5/24/2021 at 3:49 PM

## 2021-05-27 ENCOUNTER — HOSPITAL ENCOUNTER (OUTPATIENT)
Dept: PHYSICAL THERAPY | Age: 51
Setting detail: THERAPIES SERIES
Discharge: HOME | End: 2021-05-27
Payer: COMMERCIAL

## 2021-05-27 PROCEDURE — 97035 APP MDLTY 1+ULTRASOUND EA 15: CPT

## 2021-05-27 PROCEDURE — 97110 THERAPEUTIC EXERCISES: CPT

## 2021-05-27 ASSESSMENT — PAIN DESCRIPTION - ORIENTATION: ORIENTATION: LEFT

## 2021-05-27 ASSESSMENT — PAIN DESCRIPTION - DESCRIPTORS: DESCRIPTORS: RADIATING

## 2021-05-27 ASSESSMENT — PAIN DESCRIPTION - LOCATION: LOCATION: ARM;NECK;SHOULDER

## 2021-05-27 NOTE — PROGRESS NOTES
Physical Therapy  Daily Treatment Note  Date: 2021  Patient Name: Juliette Moran  MRN: [id number]     :   1970    Subjective:   General  Additional Pertinent Hx: 54 y/o M presents with complaint of neck and LUE pain. PMH includes ACDF C5-6, C6-7, COPD, hx vertebral fx  Referring Practitioner: NOEMI Alberto  PT Insurance Information: [facility] [employer] plan  Total # of Visits Approved:  (8-12)  Total # of Visits to Date: 3  Plan of Care/Certification Expiration Date: 08/15/21  Progress Note Due Date: 21  Subjective: it's bothering me more today, got it irritate at work and it's in my L arm  Patient Currently in Pain: Yes  Pain Level: 5  Pain Type: Chronic pain  Pain Location: Arm;Neck; Shoulder  Pain Orientation: Left  Pain Descriptors: Radiating       Treatment Activities:     Exercises  Exercise 1: NO ICT d/t hx of ACDF            **: HEP IN CHART**  Exercise 2: CROM  3\" x 10 ea  Exercise 3: Bilat upper trap stretch  10\" x 3   : L only  Exercise 4: Bilat levator stretch 10\" x 3   :  L only  Exercise 5: Bilat scalene stretch  10\" x 3   :  L only  Exercise 6: Chin tucks  3\" x 10  Exercise 7: Slouch with overcorrection  x 10  Exercise 8: Backwards shoulders rolls/shrugs  x 10  Exercise 9: Fwd bent rows  x 10  Exercise 10: Pec stretch on doorway  10\" x 3 ea  Exercise 11: Scapular retraction with t-band  yellow  x 10  Exercise 12: Standing Is, Ts, Ys  Yellow x 10 ea  Exercise 13: Lat pulldowns  50# (nautalus)  x 10  Exercise 14: Rhomboid stretch on pole  10\" x 3  Exercise 15: Rolling t-ball up wall (upper thoracic stretch)  5\" x 5  Exercise 16: Median/radial nerve glides (in Paperlite chart)--3 reps radial and 3 median with wrist and elbow--not today  Exercise 17: Supine first rib mobilizations  5\" x 5  : seated as per HEP  Exercise 18: Supine on towel/short thoracic pad  x 2'  Exercise 19: Suboccipital release/STM/Gentle manual traction  x 8' total  Exercise 20: U/S vs IFC PRN for elevated pain--  U/S 2.0w/cm2  x 8' L lat/post neck        Assessment:   Conditions Requiring Skilled Therapeutic Intervention  Body structures, Functions, Activity limitations: Decreased ROM; Decreased strength;Decreased posture; Increased pain;Decreased sensation  Assessment: HEP issued with pictures and written instructions, he was having increased radicular symptoms into LUE with L rotation and lateral flexion so these were omitted today and upper trap, levator and scalene stretches only performed going R to stretch L side, attempted in other direction but again caused radicular pain into LUE. U/S performed post session to L post/lateral neck  Treatment Diagnosis: Neck and LUE pain  REQUIRES PT FOLLOW UP: Yes        Goals:  Short term goals  Time Frame for Short term goals: 3-4 weeks  Short term goal 1: Independent with HEP  Short term goal 2: Improve cervical ROM to at least 45 degrees flex, 40 degrees ext, 100% rotation bilaterally. Long term goals  Time Frame for Long term goals : 4-6 weeks  Long term goal 1: Lessen slumped posture and fwd head. Long term goal 2: Decrease frequency and intensity of LUE radicular symptoms. Long term goal 3: Report less pain with repetitive reaching activities required for job. Long term goal 4: Improve NPDI score to 25% impairment or less. Long term goal 5: Improve QuickDASH score to 25% impairment or less.   Patient Goals   Patient goals : reduce neck and LUE pain    Plan:    Times per week: 2x  Plan weeks: 4-6 weeks  Current Treatment Recommendations: Strengthening, ROM, Manual Therapy - Soft Tissue Mobilization, Home Exercise Program, Patient/Caregiver Education & Training, Modalities, Manual Therapy - Joint Manipulation        Therapy Time   Individual Concurrent Group Co-treatment   Time In 4220         Time Out 0472         Minutes 62                 Ashley Johnson PTA    Electronically signed by Ashley Johnson PTA on 5/27/2021 at 5:01 PM

## 2021-06-01 ENCOUNTER — APPOINTMENT (OUTPATIENT)
Dept: PHYSICAL THERAPY | Age: 51
End: 2021-06-01
Payer: COMMERCIAL

## 2021-06-03 ENCOUNTER — HOSPITAL ENCOUNTER (OUTPATIENT)
Dept: PHYSICAL THERAPY | Age: 51
Setting detail: THERAPIES SERIES
Discharge: HOME | End: 2021-06-03
Payer: COMMERCIAL

## 2021-06-03 PROCEDURE — 97140 MANUAL THERAPY 1/> REGIONS: CPT

## 2021-06-03 PROCEDURE — 97110 THERAPEUTIC EXERCISES: CPT

## 2021-06-03 ASSESSMENT — PAIN SCALES - GENERAL: PAINLEVEL_OUTOF10: 3

## 2021-06-03 ASSESSMENT — PAIN DESCRIPTION - LOCATION: LOCATION: ARM;NECK

## 2021-06-03 ASSESSMENT — PAIN DESCRIPTION - ORIENTATION: ORIENTATION: LEFT

## 2021-06-03 ASSESSMENT — PAIN DESCRIPTION - PAIN TYPE: TYPE: CHRONIC PAIN

## 2021-06-03 NOTE — PROGRESS NOTES
Physical Therapy  Daily Treatment Note  Date: 6/3/2021  Patient Name: Bebe العراقي  MRN: [id number]     :   1970    Subjective:   General  Additional Pertinent Hx: 52 y/o M presents with complaint of neck and LUE pain. PMH includes ACDF C5-6, C6-7, COPD, hx vertebral fx  Referring Practitioner: NOEMI Gagnon  PT Insurance Information: [facility] [employer] plan  Total # of Visits Approved:  (8-12)  Total # of Visits to Date: 4  Plan of Care/Certification Expiration Date: 08/15/21  Progress Note Due Date: 21  Subjective: i've been having to work night shift so i don't have a much help so it was bothering me more over the weekend than it is right now. Patient Currently in Pain: Yes  Pain Level: 3  Pain Type: Chronic pain  Pain Location: Arm;Neck  Pain Orientation: Left       Treatment Activities:     Exercises  Exercise 1: NO ICT d/t hx of ACDF            **: HEP ISSUED**  Exercise 2: CROM  3\" x 10 ea  Exercise 3: Bilat upper trap stretch  10\" x 3   6/3: L only  Exercise 4: Bilat levator stretch 10\" x 3   6/3:  L only  Exercise 5: Bilat scalene stretch  10\" x 3   6/3:  L only  Exercise 6: Chin tucks  3\" x 10  Exercise 7: Slouch with overcorrection  x 10  Exercise 8: Backwards shoulders rolls/shrugs  x 10  Exercise 9: Fwd bent rows  x 10  Exercise 10: Pec stretch on doorway  10\" x 3 ea  Exercise 11: Scapular retraction with t-band  yellow  x 10  Exercise 12: Standing Is, Ts, Ys  Yellow x 10 ea  Exercise 13: Lat pulldowns  50# (nautalus)  x 10  Exercise 14: Rhomboid stretch on pole  10\" x 3  Exercise 15: Rolling t-ball up wall (upper thoracic stretch)  5\" x 5  Exercise 16: Median/radial nerve glides (in Paperlite chart)--3 reps radial and 3 median with wrist and elbow--not today  Exercise 17: Supine first rib mobilizations  5\" x 5  : seated as per HEP  Exercise 18: Supine on towel/short thoracic pad  x 2'  Exercise 19: Suboccipital release/STM/Gentle manual traction  x 8' total  Exercise 20:  U/S vs IFC PRN for elevated pain--  U/S 2.0w/cm2  x 8' L lat/post neck--not today        Assessment:   Conditions Requiring Skilled Therapeutic Intervention  Body structures, Functions, Activity limitations: Decreased ROM; Decreased strength;Decreased posture; Increased pain;Decreased sensation  Assessment: Patient continues to have limited ROM more to the L than R but when bending or rotation to the L has radicular symptoms into his LUE, he tolerates exercise routine fairly well otherwise and today had trigger point in L suboccipital region and point tenderness in L upper trap and levator performing STM. Treatment Diagnosis: Neck and LUE pain  REQUIRES PT FOLLOW UP: Yes     Goals:  Short term goals  Time Frame for Short term goals: 3-4 weeks  Short term goal 1: Independent with HEP  Short term goal 2: Improve cervical ROM to at least 45 degrees flex, 40 degrees ext, 100% rotation bilaterally. Long term goals  Time Frame for Long term goals : 4-6 weeks  Long term goal 1: Lessen slumped posture and fwd head. Long term goal 2: Decrease frequency and intensity of LUE radicular symptoms. Long term goal 3: Report less pain with repetitive reaching activities required for job. Long term goal 4: Improve NPDI score to 25% impairment or less. Long term goal 5: Improve QuickDASH score to 25% impairment or less.   Patient Goals   Patient goals : reduce neck and LUE pain    Plan:    Times per week: 2x  Plan weeks: 4-6 weeks  Current Treatment Recommendations: Strengthening, ROM, Manual Therapy - Soft Tissue Mobilization, Home Exercise Program, Patient/Caregiver Education & Training, Modalities, Manual Therapy - Joint Manipulation        Therapy Time   Individual Concurrent Group Co-treatment   Time In 0250         Time Out 5673         Minutes 55                 Doni Myers PTA    Electronically signed by Doni Myers PTA on 6/3/2021 at 5:06 PM

## 2021-06-07 ENCOUNTER — APPOINTMENT (OUTPATIENT)
Dept: PHYSICAL THERAPY | Age: 51
End: 2021-06-07
Payer: COMMERCIAL

## 2021-06-10 ENCOUNTER — APPOINTMENT (OUTPATIENT)
Dept: PHYSICAL THERAPY | Age: 51
End: 2021-06-10
Payer: COMMERCIAL

## 2021-06-14 ENCOUNTER — HOSPITAL ENCOUNTER (OUTPATIENT)
Dept: PHYSICAL THERAPY | Age: 51
Setting detail: THERAPIES SERIES
End: 2021-06-14
Payer: COMMERCIAL

## 2021-06-14 NOTE — PROGRESS NOTES
[facility] Outpatient Physical Therapy  Discharge Summary        Date:6/14/2021    Patient Name:Jeremiah Britotn    CQF:[id number]    [place]:[birth year]    Diagnosis:Diagnosis: Neck and LUE pain           Total # of Visits to Date: 4     Subjective: i've been having to work night shift so i don't have a much help so it was bothering me more over the weekend than it is right now. (Reported at last visit 6/3/21)    Assessment: Patient continues to have limited ROM more to the L than R but when bending or rotation to the L has radicular symptoms into his LUE, he tolerates exercise routine fairly well otherwise and today had trigger point in L suboccipital region and point tenderness in L upper trap and levator performing STM. (Reported at last visit 6/3/21)    D/C ASSESSMENT: Pt attended 4 total PT visits. He called 6/14/21 to state that he wished to D/C because PT was not working. Will d/c from services at pt's request.    GOALS:    Short term goals  Time Frame for Short term goals: 3-4 weeks  Short term goal 1: Independent with HEP  Short term goal 2: Improve cervical ROM to at least 45 degrees flex, 40 degrees ext, 100% rotation bilaterally. Long term goals  Time Frame for Long term goals : 4-6 weeks  Long term goal 1: Lessen slumped posture and fwd head. Long term goal 2: Decrease frequency and intensity of LUE radicular symptoms. Long term goal 3: Report less pain with repetitive reaching activities required for job. Long term goal 4: Improve NPDI score to 25% impairment or less. Long term goal 5: Improve QuickDASH score to 25% impairment or less.       PLAN:  D/c from services    Electronically signed by Nina Marshall PT on 6/14/2021 at 1:56 PM

## 2021-06-17 ENCOUNTER — APPOINTMENT (OUTPATIENT)
Dept: PHYSICAL THERAPY | Age: 51
End: 2021-06-17
Payer: COMMERCIAL

## 2021-07-19 ENCOUNTER — OFFICE VISIT (OUTPATIENT)
Dept: FAMILY MEDICINE CLINIC | Age: 51
End: 2021-07-19
Payer: COMMERCIAL

## 2021-07-19 VITALS
RESPIRATION RATE: 20 BRPM | SYSTOLIC BLOOD PRESSURE: 106 MMHG | WEIGHT: 185 LBS | HEIGHT: 70 IN | OXYGEN SATURATION: 99 % | BODY MASS INDEX: 26.48 KG/M2 | DIASTOLIC BLOOD PRESSURE: 70 MMHG | HEART RATE: 93 BPM | TEMPERATURE: 98 F

## 2021-07-19 DIAGNOSIS — H01.004 BLEPHARITIS OF LEFT UPPER EYELID, UNSPECIFIED TYPE: Primary | ICD-10-CM

## 2021-07-19 DIAGNOSIS — M79.18 MYOFASCIAL PAIN SYNDROME: ICD-10-CM

## 2021-07-19 PROCEDURE — G8427 DOCREV CUR MEDS BY ELIG CLIN: HCPCS | Performed by: NURSE PRACTITIONER

## 2021-07-19 PROCEDURE — G8419 CALC BMI OUT NRM PARAM NOF/U: HCPCS | Performed by: NURSE PRACTITIONER

## 2021-07-19 PROCEDURE — 99213 OFFICE O/P EST LOW 20 MIN: CPT | Performed by: NURSE PRACTITIONER

## 2021-07-19 PROCEDURE — 4004F PT TOBACCO SCREEN RCVD TLK: CPT | Performed by: NURSE PRACTITIONER

## 2021-07-19 PROCEDURE — 3017F COLORECTAL CA SCREEN DOC REV: CPT | Performed by: NURSE PRACTITIONER

## 2021-07-19 RX ORDER — SULFACETAMIDE SODIUM 100 MG/G
0.5 OINTMENT OPHTHALMIC 3 TIMES DAILY
Qty: 3.5 G | Refills: 0 | Status: SHIPPED | OUTPATIENT
Start: 2021-07-19 | End: 2021-07-20 | Stop reason: RX

## 2021-07-19 RX ORDER — CYCLOBENZAPRINE HCL 10 MG
10 TABLET ORAL 2 TIMES DAILY PRN
Qty: 60 TABLET | Refills: 2 | Status: SHIPPED | OUTPATIENT
Start: 2021-07-19 | End: 2021-10-11 | Stop reason: SDUPTHER

## 2021-07-19 SDOH — ECONOMIC STABILITY: FOOD INSECURITY: WITHIN THE PAST 12 MONTHS, THE FOOD YOU BOUGHT JUST DIDN'T LAST AND YOU DIDN'T HAVE MONEY TO GET MORE.: NEVER TRUE

## 2021-07-19 SDOH — ECONOMIC STABILITY: TRANSPORTATION INSECURITY
IN THE PAST 12 MONTHS, HAS LACK OF TRANSPORTATION KEPT YOU FROM MEETINGS, WORK, OR FROM GETTING THINGS NEEDED FOR DAILY LIVING?: NO

## 2021-07-19 SDOH — ECONOMIC STABILITY: TRANSPORTATION INSECURITY
IN THE PAST 12 MONTHS, HAS THE LACK OF TRANSPORTATION KEPT YOU FROM MEDICAL APPOINTMENTS OR FROM GETTING MEDICATIONS?: NO

## 2021-07-19 SDOH — ECONOMIC STABILITY: FOOD INSECURITY: WITHIN THE PAST 12 MONTHS, YOU WORRIED THAT YOUR FOOD WOULD RUN OUT BEFORE YOU GOT MONEY TO BUY MORE.: NEVER TRUE

## 2021-07-19 ASSESSMENT — PATIENT HEALTH QUESTIONNAIRE - PHQ9
1. LITTLE INTEREST OR PLEASURE IN DOING THINGS: 0
2. FEELING DOWN, DEPRESSED OR HOPELESS: 0
SUM OF ALL RESPONSES TO PHQ QUESTIONS 1-9: 0
SUM OF ALL RESPONSES TO PHQ9 QUESTIONS 1 & 2: 0
SUM OF ALL RESPONSES TO PHQ QUESTIONS 1-9: 0
SUM OF ALL RESPONSES TO PHQ QUESTIONS 1-9: 0

## 2021-07-19 ASSESSMENT — ENCOUNTER SYMPTOMS
EYE REDNESS: 0
EYE PAIN: 0
EYE DISCHARGE: 1
PHOTOPHOBIA: 0

## 2021-07-19 ASSESSMENT — SOCIAL DETERMINANTS OF HEALTH (SDOH): HOW HARD IS IT FOR YOU TO PAY FOR THE VERY BASICS LIKE FOOD, HOUSING, MEDICAL CARE, AND HEATING?: NOT HARD AT ALL

## 2021-07-19 NOTE — PATIENT INSTRUCTIONS
Patient Education        Blepharitis: Care Instructions  Your Care Instructions     Blepharitis is an inflammation or infection of the eyelids. It causes dry, scaly crusts on the eyelids. It can also cause your eyes to itch, burn, and look red. This problem is more common in people who have rosacea, dandruff, skin allergies, or eczema. Home treatment can help you keep your eyes comfortable. Your doctor may also prescribe an ointment to put on your eyelids. Follow-up care is a key part of your treatment and safety. Be sure to make and go to all appointments, and call your doctor if you are having problems. It's also a good idea to know your test results and keep a list of the medicines you take. How can you care for yourself at home? · Wash your eyelids and eyebrows daily with baby shampoo. To wash your eyelids:  ? Place a very warm washcloth over your eyes for about a minute. This will help soften and loosen the crusts on your eyelashes. ? Put a few drops of baby shampoo on a warm washcloth. ? Gently wipe your eyelids. This helps remove any crust. It also cleans your eyelids. ? Rinse well with water. · Be safe with medicines. If your doctor prescribed medicine for you, use it exactly as directed. Call your doctor if you think you are having a problem with your medicine. When should you call for help? Call your doctor now or seek immediate medical care if:    · You have signs of an eye infection, such as:  ? Pus or thick discharge coming from the eye.  ? Redness or swelling around the eye.  ? A fever. Watch closely for changes in your health, and be sure to contact your doctor if:    · You have vision changes.     · You do not get better as expected. Where can you learn more? Go to [email or web address] and sign in to your Meetapp account. Enter G365 in the FastPay box to learn more about \"Blepharitis: Care Instructions. \"     If you do not have an account, please click on the \"Sign Up Now\" link. Current as of: August 31, 2020               Content Version: 12.9  © 2006-2021 [employer]wise, Inc[employer]. Care instructions adapted under license by [facility] ([facility]). If you have questions about a medical condition or this instruction, always ask your healthcare professional. [address] any warranty or liability for your use of this information.

## 2021-07-20 DIAGNOSIS — H01.004 BLEPHARITIS OF LEFT UPPER EYELID, UNSPECIFIED TYPE: ICD-10-CM

## 2021-07-20 RX ORDER — SULFACETAMIDE SODIUM 100 MG/ML
2 SOLUTION/ DROPS OPHTHALMIC 3 TIMES DAILY
Qty: 1 BOTTLE | Refills: 0 | OUTPATIENT
Start: 2021-07-20 | End: 2021-07-30

## 2021-07-20 NOTE — TELEPHONE ENCOUNTER
Dino Maya from [address]   [address] called and states that Bleph-10 ointment is not available. Bleph 10 drops are available or Arithromycin ointment but they are requesting an alternate medication.

## 2021-09-10 ENCOUNTER — OFFICE VISIT (OUTPATIENT)
Dept: NEUROSURGERY | Age: 51
End: 2021-09-10
Payer: COMMERCIAL

## 2021-09-10 VITALS
OXYGEN SATURATION: 98 % | WEIGHT: 190 LBS | BODY MASS INDEX: 27.2 KG/M2 | HEIGHT: 70 IN | DIASTOLIC BLOOD PRESSURE: 81 MMHG | SYSTOLIC BLOOD PRESSURE: 129 MMHG | HEART RATE: 88 BPM

## 2021-09-10 DIAGNOSIS — M54.2 NECK PAIN: Primary | ICD-10-CM

## 2021-09-10 DIAGNOSIS — R29.2 HYPERREFLEXIA: ICD-10-CM

## 2021-09-10 DIAGNOSIS — R20.0 NUMBNESS AND TINGLING IN LEFT HAND: ICD-10-CM

## 2021-09-10 DIAGNOSIS — Z98.1 HISTORY OF FUSION OF CERVICAL SPINE: ICD-10-CM

## 2021-09-10 DIAGNOSIS — M79.602 LEFT ARM PAIN: ICD-10-CM

## 2021-09-10 DIAGNOSIS — R20.2 NUMBNESS AND TINGLING IN LEFT HAND: ICD-10-CM

## 2021-09-10 PROCEDURE — 3017F COLORECTAL CA SCREEN DOC REV: CPT | Performed by: NURSE PRACTITIONER

## 2021-09-10 PROCEDURE — G8419 CALC BMI OUT NRM PARAM NOF/U: HCPCS | Performed by: NURSE PRACTITIONER

## 2021-09-10 PROCEDURE — G8427 DOCREV CUR MEDS BY ELIG CLIN: HCPCS | Performed by: NURSE PRACTITIONER

## 2021-09-10 PROCEDURE — 4004F PT TOBACCO SCREEN RCVD TLK: CPT | Performed by: NURSE PRACTITIONER

## 2021-09-10 PROCEDURE — 99213 OFFICE O/P EST LOW 20 MIN: CPT | Performed by: NURSE PRACTITIONER

## 2021-09-10 ASSESSMENT — ENCOUNTER SYMPTOMS
GASTROINTESTINAL NEGATIVE: 1
RESPIRATORY NEGATIVE: 1
EYES NEGATIVE: 1

## 2021-09-10 NOTE — PROGRESS NOTES
Review of Systems   Constitutional: Negative. HENT: Negative. Eyes: Negative. Respiratory: Negative. Cardiovascular: Negative. Gastrointestinal: Negative. Genitourinary: Negative. Musculoskeletal: Positive for myalgias and neck pain. Skin: Negative. Neurological: Positive for tingling. Endo/Heme/Allergies: Negative. Psychiatric/Behavioral: Negative.

## 2021-09-10 NOTE — PROGRESS NOTES
[facility] Neurosurgery  Follow up Office Visit        Chief Complaint   Patient presents with    Follow-up    Neck Pain    Leg Pain       9/10/2021: Mr. Kevin Clemente returns to clinic today to re-evaluate following physical therapy. He states it did nothing. He reports the majority of his pain is located on the left side of the neck. The pain does radiate into the left shoulder, triceps, forearm, and the last 2 digits. He states he has trouble using the left arm to carry or lift things. He can barely carry his wrenches. He states he is in severe pain. HISTORY OF PRESENT ILLNESS:      Angel Roman is a 49 y.o. 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 Prior to surgery he complained of LUE severe arm pain. The arm pain was gone, however, his neck pain remained. Today he returns to clinic with complaints of neck and left arm pain. The pain will radiate into the left shoulder, triceps, forearm. He will have numbness of the left first 3 digits. Over the last few months he states the pain has worsened. He mentions that he was having significant chest pain about 3 months ago, actually went to our ED and MI was ruled out. He mentions having low back pain as well. He has tried Tylenol and muscle relaxers for pain. Has not tried therapy.             Past Medical History:   Diagnosis Date    Arthritis     Chronic back pain     sciatica    COPD (chronic obstructive pulmonary disease) (HCC)     Neck pain     HX vertebral fx with hardware       Past Surgical History:   Procedure Laterality Date    ABDOMEN SURGERY Left 1/10/2019    EXCISION MASS GROIN performed by Zayra Prater MD at [address] N/A 3/1/2017    ACDF C5-6, C6-7 WITH ALLOGRAFT BONE AND ANTERIOR CERVICAL PLATING  performed by Mya Murdock DO at [facility] COLONOSCOPY N/A 4/10/2018    Dr Harjit Slater AP (-) dysplasia x 1--5 yr recall    NECK SURGERY      TONSILLECTOMY AND ADENOIDECTOMY          Medications    Current Outpatient Medications:     cyclobenzaprine (FLEXERIL) 10 MG tablet, Take 1 tablet by mouth 2 times daily as needed for Muscle spasms, Disp: 60 tablet, Rfl: 2    acetaminophen (TYLENOL) 325 MG tablet, Take 650 mg by mouth every 6 hours as needed for Pain, Disp: , Rfl:   Pcn [penicillins]    Social History  Social History     Tobacco Use   Smoking Status Current Every Day Smoker    Packs/day: 1.50    Years: 30.00    Pack years: 45.00    Types: Cigarettes   Smokeless Tobacco Never Used     Social History     Substance and Sexual Activity   Alcohol Use Yes    Comment: occasionally         Family History   Problem Relation Age of Onset    Cancer Mother         breast     Cancer Maternal Grandmother         breast     Cancer Father         colon       Review of Systems   Constitutional: Negative. HENT: Negative. Negative for congestion, ear discharge, ear pain, hearing loss, nosebleeds, sore throat and tinnitus. Eyes: Negative. Respiratory: Negative. Negative for stridor. Cardiovascular: Negative. Gastrointestinal: Negative. Genitourinary: Negative. Musculoskeletal: Positive for myalgias and neck pain. Negative for back pain, falls and joint pain. Skin: Negative. Neurological: Positive for tingling. Negative for dizziness, tremors, sensory change, speech change, focal weakness, seizures, loss of consciousness and headaches. Endo/Heme/Allergies: Negative. Psychiatric/Behavioral: Negative. PHYSICAL EXAM:  Vitals:    09/10/21 1055   BP: 129/81   Pulse: 88   SpO2: 98%     Constitutional: The patient appears well-developed and well-nourished.    Eyes  conjunctiva normal.  Conjugate gaze  Ear, nose, throat -No scars, masses, or lesions over external nose or ears, no atrophy of tongue  Neck-symmetric, no masses noted, no jugular vein distension  Respiration- chest wall appears symmetric, good expansion, normal effort without use of accessory muscles  Musculoskeletal  no significant wasting of muscles noted, no bony deformities, gait no gross ataxia  Extremities-no clubbing, cyanosis or edema  Skin  warm, dry, and intact. No rash, erythema, or pallor. Psychiatric  mood, affect, and behavior appear normal.     Neurologic Examination  Awake, Alert and oriented x 4  Normal speech pattern, following commands    Motor:  RIGHT: hand grasp 5/5    finger extension 5/5    bicep 5/5    triceps 5/5    deltoid 5/5    LEFT:   hand grasp 5/5    finger extension 5/5    bicep 5/5    triceps 5/5    deltoid 5/5    No deficits to light touch or pinprick sensation  Reflexes are 3+ left bicep  No clonus or Hoffmans sign  No myofacial tenderness to palpation  Normal Gait pattern      Wound:  Healed well    DATA and IMAGING:    Lab Results   Component Value Date    WBC 9.7 11/10/2020    HGB 13.8 (L) 11/10/2020    HCT 41.9 (L) 11/10/2020    MCV 89.1 11/10/2020     11/10/2020     Lab Results   Component Value Date     11/10/2020    K 4.2 11/10/2020     11/10/2020    CO2 25 11/10/2020    BUN 7 11/10/2020    CREATININE 0.8 11/10/2020    GLUCOSE 84 11/10/2020    CALCIUM 9.2 11/10/2020    PROT 6.7 11/08/2020    LABALBU 3.8 11/08/2020    BILITOT <0.2 11/08/2020    ALKPHOS 87 11/08/2020    AST 10 11/08/2020    ALT 10 11/08/2020    LABGLOM >60 11/10/2020    GFRAA >59 11/10/2020    GLOB 2.8 03/02/2017     Lab Results   Component Value Date    INR 0.92 02/15/2017    PROTIME 12.4 02/15/2017    No results found. Narrative   Examination. XR CERVICAL SPINE (2-3 VIEWS) 11/13/2020 11:33 AM   History: Chronic neck pain. The frontal and lateral views of the cervical spine are compared with   the previous study dated 9/12/2017. No significant interval change. The hardware fusion of C5, C6 and C7 is noted which is unchanged. No   hardware complication. There is complete bony fusion.    There is hardware internal fixation of C2 with the metallic pain. No   change in position of the heart. . No complication. Normal alignment. There is moderate diffuse osteopenia. Chronic degenerative changes are seen in the form of osteophytes. Posterior processes are intact. Prevertebral soft tissues are normal.       Impression   No acute bony abnormality. Hardware fusion and internal fixation as detailed above. No hardware   complication. A moderate diffuse osteopenia. Signed by Dr Susan Pittman on 11/13/2020 1:02 PM   I have personally reviewed the images and my interpretation is:  Hardware stable, good bony fusion noted at C5-6, C6-7      ASSESSMENT   Maximiliano Barrera is a 40 y.o. 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 PLAN:  Given that his pain has not improved with PT, we will move forward with MRI. He inquires about pain medication today. I informed him that our policy is that we only prescribe/treat post-operative pain medication up to 3 months following surgery. He would have to speak with his PCP or see a pain management provider. He wants to be referred to [facility] pain management. I see where he was discharged from pain management here in 2020, therefore, I assume they will not accept him now. I have referred him to Dr. Jeffrey Zaldivar.    -Obtain repeat MRI cervical spine  -Follow up after imaging         ICD-10-CM    1. Neck pain  M54.2 MRI CERVICAL SPINE W WO CONTRAST   2. Left arm pain  M79.602 MRI CERVICAL SPINE W WO CONTRAST   3. Numbness and tingling in left hand  R20.0 MRI CERVICAL SPINE W WO CONTRAST    R20.2    4. Hyperreflexia  R29.2 MRI CERVICAL SPINE W WO CONTRAST   5.  History of fusion of cervical spine  Z98.1 MRI CERVICAL SPINE W WO CONTRAST        NOEMI Goodson

## 2021-09-16 ENCOUNTER — HOSPITAL ENCOUNTER (OUTPATIENT)
Dept: MRI IMAGING | Age: 51
Discharge: HOME | End: 2021-09-16
Payer: COMMERCIAL

## 2021-09-16 DIAGNOSIS — M54.2 NECK PAIN: ICD-10-CM

## 2021-09-16 DIAGNOSIS — R20.0 NUMBNESS AND TINGLING IN LEFT HAND: ICD-10-CM

## 2021-09-16 DIAGNOSIS — R20.2 NUMBNESS AND TINGLING IN LEFT HAND: ICD-10-CM

## 2021-09-16 DIAGNOSIS — M79.602 LEFT ARM PAIN: ICD-10-CM

## 2021-09-16 DIAGNOSIS — R29.2 HYPERREFLEXIA: ICD-10-CM

## 2021-09-16 DIAGNOSIS — Z98.1 HISTORY OF FUSION OF CERVICAL SPINE: ICD-10-CM

## 2021-09-16 PROCEDURE — 72156 MRI NECK SPINE W/O & W/DYE: CPT

## 2021-09-16 PROCEDURE — A9577 INJ MULTIHANCE: HCPCS | Performed by: NURSE PRACTITIONER

## 2021-09-16 PROCEDURE — 6360000004 HC RX CONTRAST MEDICATION: Performed by: NURSE PRACTITIONER

## 2021-09-16 RX ADMIN — GADOBENATE DIMEGLUMINE 18 ML: 529 INJECTION, SOLUTION INTRAVENOUS at 14:32

## 2021-09-29 ENCOUNTER — TELEPHONE (OUTPATIENT)
Dept: NEUROSURGERY | Age: 51
End: 2021-09-29

## 2021-09-29 NOTE — TELEPHONE ENCOUNTER
Spoke with Dwight uLna with pain management she states patient was called and an appointment was never scheduled due to the patient didn't schedule. Asked [facility] if it was okay to still see patient she said yes , due to patient had imaging done and physical therapy.

## 2021-09-30 ENCOUNTER — OFFICE VISIT (OUTPATIENT)
Dept: NEUROSURGERY | Age: 51
End: 2021-09-30
Payer: COMMERCIAL

## 2021-09-30 VITALS
OXYGEN SATURATION: 98 % | WEIGHT: 189 LBS | BODY MASS INDEX: 26.46 KG/M2 | DIASTOLIC BLOOD PRESSURE: 80 MMHG | HEART RATE: 93 BPM | SYSTOLIC BLOOD PRESSURE: 125 MMHG | HEIGHT: 71 IN

## 2021-09-30 DIAGNOSIS — R20.0 NUMBNESS AND TINGLING IN LEFT HAND: ICD-10-CM

## 2021-09-30 DIAGNOSIS — M48.02 FORAMINAL STENOSIS OF CERVICAL REGION: Primary | ICD-10-CM

## 2021-09-30 DIAGNOSIS — M54.2 NECK PAIN: ICD-10-CM

## 2021-09-30 DIAGNOSIS — R20.2 NUMBNESS AND TINGLING IN LEFT HAND: ICD-10-CM

## 2021-09-30 DIAGNOSIS — M79.602 LEFT ARM PAIN: ICD-10-CM

## 2021-09-30 DIAGNOSIS — M50.30 DDD (DEGENERATIVE DISC DISEASE), CERVICAL: ICD-10-CM

## 2021-09-30 DIAGNOSIS — M48.02 CERVICAL STENOSIS OF SPINAL CANAL: ICD-10-CM

## 2021-09-30 PROCEDURE — G8419 CALC BMI OUT NRM PARAM NOF/U: HCPCS | Performed by: NURSE PRACTITIONER

## 2021-09-30 PROCEDURE — 99214 OFFICE O/P EST MOD 30 MIN: CPT | Performed by: NURSE PRACTITIONER

## 2021-09-30 PROCEDURE — G8427 DOCREV CUR MEDS BY ELIG CLIN: HCPCS | Performed by: NURSE PRACTITIONER

## 2021-09-30 PROCEDURE — 4004F PT TOBACCO SCREEN RCVD TLK: CPT | Performed by: NURSE PRACTITIONER

## 2021-09-30 PROCEDURE — 3017F COLORECTAL CA SCREEN DOC REV: CPT | Performed by: NURSE PRACTITIONER

## 2021-09-30 ASSESSMENT — ENCOUNTER SYMPTOMS
RESPIRATORY NEGATIVE: 1
GASTROINTESTINAL NEGATIVE: 1
EYES NEGATIVE: 1

## 2021-09-30 NOTE — PROGRESS NOTES
Review of Systems   Constitutional: Negative. HENT: Negative. Eyes: Negative. Respiratory: Negative. Cardiovascular: Negative. Gastrointestinal: Negative. Genitourinary: Negative. Musculoskeletal: Positive for myalgias and neck pain. Skin: Negative. Neurological: Positive for headaches. Endo/Heme/Allergies: Negative. Psychiatric/Behavioral: Negative.
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
## 2021-10-11 ENCOUNTER — OFFICE VISIT (OUTPATIENT)
Dept: FAMILY MEDICINE CLINIC | Age: 51
End: 2021-10-11
Payer: COMMERCIAL

## 2021-10-11 VITALS
TEMPERATURE: 97.2 F | SYSTOLIC BLOOD PRESSURE: 122 MMHG | WEIGHT: 188 LBS | HEART RATE: 86 BPM | OXYGEN SATURATION: 97 % | BODY MASS INDEX: 26.22 KG/M2 | DIASTOLIC BLOOD PRESSURE: 80 MMHG

## 2021-10-11 DIAGNOSIS — R20.2 NUMBNESS AND TINGLING IN LEFT HAND: ICD-10-CM

## 2021-10-11 DIAGNOSIS — M48.02 FORAMINAL STENOSIS OF CERVICAL REGION: ICD-10-CM

## 2021-10-11 DIAGNOSIS — M48.02 CERVICAL STENOSIS OF SPINAL CANAL: ICD-10-CM

## 2021-10-11 DIAGNOSIS — M79.602 LEFT ARM PAIN: ICD-10-CM

## 2021-10-11 DIAGNOSIS — M54.2 NECK PAIN: Primary | ICD-10-CM

## 2021-10-11 DIAGNOSIS — R20.0 NUMBNESS AND TINGLING IN LEFT HAND: ICD-10-CM

## 2021-10-11 DIAGNOSIS — M79.18 MYOFASCIAL PAIN SYNDROME: ICD-10-CM

## 2021-10-11 DIAGNOSIS — M50.30 DDD (DEGENERATIVE DISC DISEASE), CERVICAL: ICD-10-CM

## 2021-10-11 PROCEDURE — G8484 FLU IMMUNIZE NO ADMIN: HCPCS | Performed by: FAMILY MEDICINE

## 2021-10-11 PROCEDURE — G8419 CALC BMI OUT NRM PARAM NOF/U: HCPCS | Performed by: FAMILY MEDICINE

## 2021-10-11 PROCEDURE — 4004F PT TOBACCO SCREEN RCVD TLK: CPT | Performed by: FAMILY MEDICINE

## 2021-10-11 PROCEDURE — G8427 DOCREV CUR MEDS BY ELIG CLIN: HCPCS | Performed by: FAMILY MEDICINE

## 2021-10-11 PROCEDURE — 99204 OFFICE O/P NEW MOD 45 MIN: CPT | Performed by: FAMILY MEDICINE

## 2021-10-11 PROCEDURE — 3017F COLORECTAL CA SCREEN DOC REV: CPT | Performed by: FAMILY MEDICINE

## 2021-10-11 RX ORDER — CYCLOBENZAPRINE HCL 10 MG
10 TABLET ORAL 2 TIMES DAILY PRN
Qty: 60 TABLET | Refills: 2 | Status: SHIPPED | OUTPATIENT
Start: 2021-10-11 | End: 2022-01-09

## 2021-10-14 ENCOUNTER — OFFICE VISIT (OUTPATIENT)
Dept: CARDIOLOGY CLINIC | Age: 51
End: 2021-10-14
Payer: COMMERCIAL

## 2021-10-14 VITALS
BODY MASS INDEX: 27.06 KG/M2 | WEIGHT: 189 LBS | DIASTOLIC BLOOD PRESSURE: 82 MMHG | HEIGHT: 70 IN | OXYGEN SATURATION: 98 % | HEART RATE: 76 BPM | SYSTOLIC BLOOD PRESSURE: 120 MMHG

## 2021-10-14 DIAGNOSIS — G89.29 CHRONIC LEFT SHOULDER PAIN: ICD-10-CM

## 2021-10-14 DIAGNOSIS — R07.89 OTHER CHEST PAIN: Primary | ICD-10-CM

## 2021-10-14 DIAGNOSIS — F17.210 CIGARETTE NICOTINE DEPENDENCE WITHOUT COMPLICATION: ICD-10-CM

## 2021-10-14 DIAGNOSIS — G89.29 CHRONIC NECK PAIN: ICD-10-CM

## 2021-10-14 DIAGNOSIS — M25.512 CHRONIC LEFT SHOULDER PAIN: ICD-10-CM

## 2021-10-14 DIAGNOSIS — R06.02 SHORTNESS OF BREATH: ICD-10-CM

## 2021-10-14 DIAGNOSIS — M54.2 CHRONIC NECK PAIN: ICD-10-CM

## 2021-10-14 DIAGNOSIS — M50.10 CERVICAL DISC DISORDER WITH RADICULOPATHY: Chronic | ICD-10-CM

## 2021-10-14 DIAGNOSIS — E78.2 MIXED HYPERLIPIDEMIA: ICD-10-CM

## 2021-10-14 PROCEDURE — 3017F COLORECTAL CA SCREEN DOC REV: CPT | Performed by: CLINICAL NURSE SPECIALIST

## 2021-10-14 PROCEDURE — G8484 FLU IMMUNIZE NO ADMIN: HCPCS | Performed by: CLINICAL NURSE SPECIALIST

## 2021-10-14 PROCEDURE — G8427 DOCREV CUR MEDS BY ELIG CLIN: HCPCS | Performed by: CLINICAL NURSE SPECIALIST

## 2021-10-14 PROCEDURE — 4004F PT TOBACCO SCREEN RCVD TLK: CPT | Performed by: CLINICAL NURSE SPECIALIST

## 2021-10-14 PROCEDURE — G8419 CALC BMI OUT NRM PARAM NOF/U: HCPCS | Performed by: CLINICAL NURSE SPECIALIST

## 2021-10-14 PROCEDURE — 99214 OFFICE O/P EST MOD 30 MIN: CPT | Performed by: CLINICAL NURSE SPECIALIST

## 2021-10-14 ASSESSMENT — ENCOUNTER SYMPTOMS
VOMITING: 0
ABDOMINAL PAIN: 0
EYE REDNESS: 0
SHORTNESS OF BREATH: 1
COUGH: 0
CHEST TIGHTNESS: 0
FACIAL SWELLING: 0
NAUSEA: 0
WHEEZING: 0

## 2021-10-14 NOTE — PATIENT INSTRUCTIONS
Return for APRN, as needed. Quit smoking. Call the [address] [facility] [address] [phone]  Tru Dixon Nuclear Stress Test    Max at the [facility] and [address] [facility] located on the first floor of [place] [address] through hospital main entrance and turn immediately to your left. Patient's contact number:  [phone] (home) [phone] (work)     Tru Dixon Stress Test    Date   Nov 2 10:00    Lexiscan (regadenoson injection) is a prescription drug given through an IV line that increases blood flow through the arteries of the heart during a cardiac nuclear stress test.     There are two parts to a Lexiscan stress test: the rest portion and the exercise portion. For the rest portion, a radioactive tracer is injected into your arm through the IV. After 30 to 60 minutes, the process of imaging will begin. A nuclear camera will be placed on your chest area and images are taken for the next 15 to 20 minutes. For the exercise portion, a nurse will attach EKG electrodes to your chest to monitor your heart rate. The drug Tru Dixon is administered to simulate stress on the heart. Your heart rhythm will then be monitored for the next few minutes. Your blood pressure will also be monitored throughout the exercise portion. Shock through the exercise portion, a second round of radioactive tracer is injected into your body. Your heart rate and EKG will be monitored for another few minutes after administering the drug. Test Preparation:     Bring a list of your current medications. Do not take any of your medications the morning of the test, but bring all morning medications with you as you will take them after the stress portion of the test is completed.  Do not eat Bananas 12 hours prior to test.     No caffeine  hours prior to the testing. This includes: coffee, pop/soda, chocolate, cold medications, etc.  Any product that might contain caffeine.      No nicotine or alcohol 12 hours prior to your test.    Nothing to eat or drink 6-8 hours prior to appointment time. It is okay to drink small amounts of water during the four hours prior to the test.   Nitroglycerin patches must be taken off 1 hour before testing.  Wear comfortable clothing.  Please refrain from any strenuous exercise or activities the day before your test, or the day of your test.   The Nuclear Lexiscan Stress test takes about 2 ½ to 3 hours to complete. If for any reason you are unable to keep this appointment, please contact Outpatient Scheduling, [phone], as soon as possible to reschedule.

## 2021-10-14 NOTE — PROGRESS NOTES
[facility], [address] [facility] [address] [facility] [place], [address] [address]  Phone: [phone]  Fax: [phone]    OFFICE VISIT:  10/14/2021    [address] [place] [address] Avenue: 1970    Reason For Visit:  Gia Hurt is a 49 y.o. male who is here for Follow-up (patient is here due to having an abnormal ekg from work. ekg's are under the media tab from all 4 years. ) and Chest Pain       Diagnosis Orders   1. Other chest pain  NM MYOCARDIAL SPECT REST EXERCISE OR RX   2. Shortness of breath  NM MYOCARDIAL SPECT REST EXERCISE OR RX   3. Cigarette nicotine dependence without complication     4. Chronic neck pain     5. Chronic left shoulder pain     6. Cervical disc disorder with radiculopathy     7. Mixed hyperlipidemia           HPI  Patient was referred back to our office after abnormal EKG done at his workplace. An incomplete right bundle branch block was identified. Patient's EKG here at [facility] on 12/14/2020 showed an incomplete right bundle branch block as well, so really no significant changes. Patient however continues to complain of some intermittent chest discomfort that can be with exertion. He has chronic pain related to his neck, back and left shoulder. He has been diagnosed with cervical stenosis. Surgery is not recommended. He will be  considering going back to pain management. In genera,l he is fatigued. He has shortness of breath that he relates to his continued smoking. A walking nuclear stress test in November 2020 and showed no evidence of ischemia, however he did have a poor exercise tolerance. Jessie Mohs, APRN is PCP.   Williamstephen Richardson has the following history as recorded in [facility]:    Patient Active Problem List    Diagnosis Date Noted    Other chest pain 11/09/2020    Chronic left shoulder pain 10/16/2019    Chronic left-sided thoracic back pain 01/29/2019    Left groin mass     Chronic pain of both shoulders 12/18/2018    CTS (carpal tunnel syndrome) [penicillins]    Review of Systems  Review of Systems   Constitutional: Negative for activity change, diaphoresis, fatigue, fever and unexpected weight change. HENT: Negative for facial swelling and nosebleeds. Eyes: Negative for redness and visual disturbance. Respiratory: Positive for shortness of breath. Negative for cough, chest tightness and wheezing. Cardiovascular: Positive for chest pain. Negative for palpitations and leg swelling. Gastrointestinal: Negative for abdominal pain, nausea and vomiting. Endocrine: Negative for cold intolerance and heat intolerance. Genitourinary: Negative for dysuria and hematuria. Musculoskeletal: Positive for neck pain (chronic). Negative for arthralgias and myalgias. Chronic left arm, neck pain   Skin: Negative for pallor and rash. Neurological: Negative for dizziness, seizures, syncope, weakness and light-headedness. Hematological: Does not bruise/bleed easily. Psychiatric/Behavioral: Negative for agitation. The patient is not nervous/anxious. Objective  Vital Signs - /82   Pulse 76   Ht 5' 10\" (1.778 m)   Wt 189 lb (85.7 kg)   SpO2 98%   BMI 27.12 kg/m²   Physical Exam  Vitals and nursing note reviewed. Constitutional:       General: He is not in acute distress. Appearance: Normal appearance. He is well-developed. He is not diaphoretic. HENT:      Head: Normocephalic and atraumatic. Right Ear: Hearing and external ear normal.      Left Ear: Hearing and external ear normal.      Nose: Nose normal.   Eyes:      General:         Right eye: No discharge. Left eye: No discharge. Pupils: Pupils are equal, round, and reactive to light. Neck:      Thyroid: No thyromegaly. Vascular: No carotid bruit or JVD. Trachea: No tracheal deviation. Cardiovascular:      Rate and Rhythm: Normal rate and regular rhythm. Heart sounds: Normal heart sounds. No murmur heard. No friction rub. No gallop. Pulmonary:      Effort: Pulmonary effort is normal. No respiratory distress. Breath sounds: Normal breath sounds. No wheezing or rales. Abdominal:      Palpations: Abdomen is soft. Tenderness: There is no abdominal tenderness. Musculoskeletal:         General: No swelling or deformity. Cervical back: Neck supple. No muscular tenderness. Right lower leg: No edema. Left lower leg: No edema. Comments: Normal gait and station   Skin:     General: Skin is warm and dry. Findings: No rash. Neurological:      General: No focal deficit present. Mental Status: He is alert and oriented to person, place, and time. Cranial Nerves: No cranial nerve deficit. Psychiatric:         Mood and Affect: Mood normal.         Behavior: Behavior normal.         Judgment: Judgment normal.         Data:  Shawna 11/8/20  Impression:   Low functional tolerance with symptoms and without EKG changes   suggestive of ischemia. There is no obvious infarct or ischemia, with a calculated ejection   fraction of   58  %. Suggest: Clinical correlation as patient had significant reported   chest pain at low functional tolerance with no significant EKG changes   noted. Signed by Dr iTta Everett on 11/9/2020 8:42 PM     Echo 11/9/20  Conclusions      Summary   LV is normal in size with normal systolic function. LV ejection fraction   estimated at 55 to 60%. Normal diastolic function. RV is normal in size with normal systolic function. Normal left atrial size. Normal right atrial size. Aortic valve is trileaflet with normal leaflet mobility. No stenosis or   regurgitation noted. Mitral valve is structurally normal with normal leaflet mobility. No   significant stenosis or regurgitation. No significant tricuspid regurgitation. No significant pericardial effusion.    Ascending aortic caliber appears normal.        Lab Results   Component Value Date    CHOL 173 11/08/2020    TRIG 494 (H) 11/08/2020    HDL 24 (L) 11/08/2020    LDLCALC see below 11/08/2020    LDLDIRECT 103 11/08/2020     Lab Results   Component Value Date    ALT 10 11/08/2020    AST 10 11/08/2020         Assessment:     Diagnosis Orders   1. Other chest pain  NM MYOCARDIAL SPECT REST EXERCISE OR RX   2. Shortness of breath  NM MYOCARDIAL SPECT REST EXERCISE OR RX   3. Cigarette nicotine dependence without complication     4. Chronic neck pain     5. Chronic left shoulder pain     6. Cervical disc disorder with radiculopathy     7. Mixed hyperlipidemia         Chest pain/ dyspneasymptoms continue intermittently. EKG is unchanged compared to previous. Patient had a poor exercise effort on last walking nuclear stress test.  We will do a nonwalking modality with a Lexiscan nuclear stress test to rule out myocardial ischemia. Nicotine dependenceInstructed patient in smoking cessation rationales, strategies, and available resources x 3 minutes. We discussed that from a prevention standpoint for cardiovascular disease, smoking cessation is one of the best things he can do. Hyperlipidemiasignificantly elevated triglycerides. Encouraged him to address this with PCP and consider starting over-the-counter fish oil again which he has done in the past    Chronic neck and shoulder pain, cervical radiculopathy-following with neurosurgery considering going back to pain management    Plan    Orders Placed This Encounter   Procedures    NM MYOCARDIAL SPECT REST EXERCISE OR RX     With myocardial perfusion study with sestamibi     Standing Status:   Future     Standing Expiration Date:   10/14/2022     Order Specific Question:   Reason for Exam?     Answer:   Shortness of breath     Order Specific Question:   Reason for Exam?     Answer:   Chest pain     Order Specific Question:   Procedure Type     Answer:   Rx     Order Specific Question:   Reason for exam:     Answer:   chest pain, dyspnea     Return for APRN, as needed.    Quit smoking. Call the [address] [place] [address] [phone]  Shyann Hughes Nuclear Stress Test    Call with any questionsor concerns  Follow up with NOEMI Lucas for non cardiac problems  Report any new problems  Cardiovascular Fitness-Exercise as tolerated. Strive for 15 minutes of exercise most days of the week. Cardiac / HealthyDiet  Continue current medications as directed  Continue plan of treatment  It is always recommended that you bring your medicationsbottles with you to each visit - this is for your safety!        NOEMI Fleming

## 2021-10-27 ASSESSMENT — ENCOUNTER SYMPTOMS
COUGH: 0
SHORTNESS OF BREATH: 0
ABDOMINAL PAIN: 0
VOMITING: 0
NAUSEA: 0
EYE DISCHARGE: 0
DIARRHEA: 0
CONSTIPATION: 0
EYE PAIN: 0
BACK PAIN: 0
RHINORRHEA: 0

## 2021-11-02 ENCOUNTER — HOSPITAL ENCOUNTER (OUTPATIENT)
Dept: NUCLEAR MEDICINE | Age: 51
Discharge: HOME | End: 2021-11-04
Payer: COMMERCIAL

## 2021-11-02 DIAGNOSIS — R06.02 SHORTNESS OF BREATH: ICD-10-CM

## 2021-11-02 DIAGNOSIS — R07.89 OTHER CHEST PAIN: ICD-10-CM

## 2021-11-02 LAB
LV EF: 65 %
LVEF MODALITY: NORMAL

## 2021-11-02 PROCEDURE — A9500 TC99M SESTAMIBI: HCPCS | Performed by: CLINICAL NURSE SPECIALIST

## 2021-11-02 PROCEDURE — 78452 HT MUSCLE IMAGE SPECT MULT: CPT

## 2021-11-02 PROCEDURE — 3430000000 HC RX DIAGNOSTIC RADIOPHARMACEUTICAL: Performed by: CLINICAL NURSE SPECIALIST

## 2021-11-02 PROCEDURE — 6360000002 HC RX W HCPCS: Performed by: CLINICAL NURSE SPECIALIST

## 2021-11-02 RX ADMIN — TETRAKIS(2-METHOXYISOBUTYLISOCYANIDE)COPPER(I) TETRAFLUOROBORATE 10 MILLICURIE: 1 INJECTION, POWDER, LYOPHILIZED, FOR SOLUTION INTRAVENOUS at 13:56

## 2021-11-02 RX ADMIN — REGADENOSON 0.4 MG: 0.08 INJECTION, SOLUTION INTRAVENOUS at 12:33

## 2021-11-02 RX ADMIN — TETRAKIS(2-METHOXYISOBUTYLISOCYANIDE)COPPER(I) TETRAFLUOROBORATE 30 MILLICURIE: 1 INJECTION, POWDER, LYOPHILIZED, FOR SOLUTION INTRAVENOUS at 13:57

## 2022-05-02 ENCOUNTER — E-VISIT (OUTPATIENT)
Dept: INTERNAL MEDICINE | Age: 52
End: 2022-05-02
Payer: COMMERCIAL

## 2022-05-02 DIAGNOSIS — J01.90 ACUTE SINUSITIS, RECURRENCE NOT SPECIFIED, UNSPECIFIED LOCATION: Primary | ICD-10-CM

## 2022-05-02 PROCEDURE — 99421 OL DIG E/M SVC 5-10 MIN: CPT | Performed by: INTERNAL MEDICINE

## 2022-05-02 RX ORDER — METHYLPREDNISOLONE 4 MG/1
TABLET ORAL
Qty: 1 KIT | Refills: 0 | Status: SHIPPED | OUTPATIENT
Start: 2022-05-02 | End: 2022-05-08

## 2022-05-02 RX ORDER — DOXYCYCLINE HYCLATE 100 MG
100 TABLET ORAL 2 TIMES DAILY
Qty: 20 TABLET | Refills: 0 | Status: SHIPPED | OUTPATIENT
Start: 2022-05-02 | End: 2022-05-12

## 2022-05-02 ASSESSMENT — LIFESTYLE VARIABLES
SMOKING_STATUS: YES
SMOKING_YEARS: 30

## 2022-05-02 NOTE — PROGRESS NOTES
Patient submitted an E-visit for sinus infection. Doxycycline and Medrol Dosepak called into pharmacy. About 5 minutes spent on E-visit.     Electronically signed by Amber Hawkins MD on 5/2/2022 at 9:50 AM

## 2022-11-23 ENCOUNTER — OFFICE VISIT (OUTPATIENT)
Dept: FAMILY MEDICINE CLINIC | Age: 52
End: 2022-11-23
Payer: COMMERCIAL

## 2022-11-23 VITALS
HEART RATE: 81 BPM | DIASTOLIC BLOOD PRESSURE: 70 MMHG | TEMPERATURE: 98.1 F | BODY MASS INDEX: 27.63 KG/M2 | WEIGHT: 193 LBS | OXYGEN SATURATION: 97 % | HEIGHT: 70 IN | SYSTOLIC BLOOD PRESSURE: 124 MMHG

## 2022-11-23 DIAGNOSIS — J10.1 INFLUENZA A: Primary | ICD-10-CM

## 2022-11-23 DIAGNOSIS — J34.89 NASAL CONGESTION WITH RHINORRHEA: ICD-10-CM

## 2022-11-23 DIAGNOSIS — R52 GENERALIZED BODY ACHES: ICD-10-CM

## 2022-11-23 DIAGNOSIS — R09.81 NASAL CONGESTION WITH RHINORRHEA: ICD-10-CM

## 2022-11-23 DIAGNOSIS — R05.1 ACUTE COUGH: ICD-10-CM

## 2022-11-23 LAB
INFLUENZA A ANTIBODY: ABNORMAL
INFLUENZA B ANTIBODY: ABNORMAL

## 2022-11-23 PROCEDURE — 4004F PT TOBACCO SCREEN RCVD TLK: CPT | Performed by: FAMILY MEDICINE

## 2022-11-23 PROCEDURE — G8427 DOCREV CUR MEDS BY ELIG CLIN: HCPCS | Performed by: FAMILY MEDICINE

## 2022-11-23 PROCEDURE — 87804 INFLUENZA ASSAY W/OPTIC: CPT | Performed by: FAMILY MEDICINE

## 2022-11-23 PROCEDURE — G8484 FLU IMMUNIZE NO ADMIN: HCPCS | Performed by: FAMILY MEDICINE

## 2022-11-23 PROCEDURE — 99213 OFFICE O/P EST LOW 20 MIN: CPT | Performed by: FAMILY MEDICINE

## 2022-11-23 PROCEDURE — G8419 CALC BMI OUT NRM PARAM NOF/U: HCPCS | Performed by: FAMILY MEDICINE

## 2022-11-23 PROCEDURE — 3017F COLORECTAL CA SCREEN DOC REV: CPT | Performed by: FAMILY MEDICINE

## 2022-11-23 RX ORDER — DEXTROMETHORPHAN HYDROBROMIDE AND PROMETHAZINE HYDROCHLORIDE 15; 6.25 MG/5ML; MG/5ML
5 SYRUP ORAL 4 TIMES DAILY PRN
Qty: 180 ML | Refills: 0 | Status: SHIPPED | OUTPATIENT
Start: 2022-11-23 | End: 2022-12-02

## 2022-11-23 RX ORDER — FLUTICASONE PROPIONATE 50 MCG
2 SPRAY, SUSPENSION (ML) NASAL DAILY
Qty: 16 G | Refills: 2 | Status: SHIPPED | OUTPATIENT
Start: 2022-11-23

## 2022-11-23 RX ORDER — LEVOCETIRIZINE DIHYDROCHLORIDE 5 MG/1
5 TABLET, FILM COATED ORAL NIGHTLY
Qty: 30 TABLET | Refills: 2 | Status: SHIPPED | OUTPATIENT
Start: 2022-11-23

## 2022-11-23 NOTE — PROGRESS NOTES
Irma CARTWRIGHT [facility]  [address] [address]  Dept: [phone]  Dept Fax: [phone]    Visit type: Established patient    Reason for Visit: Cough, Chills, Congestion, and Generalized Body Aches         Assessment and Plan       1. Influenza A  2. Generalized body aches  -     POCT Influenza A/B  3. Acute cough  -     POCT Influenza A/B  4. Nasal congestion with rhinorrhea  -     levocetirizine (XYZAL) 5 MG tablet; Take 1 tablet by mouth nightly, Disp-30 tablet, R-2Normal  -     fluticasone (FLONASE) 50 MCG/ACT nasal spray; 2 sprays by Each Nostril route daily, Disp-16 g, R-2Normal    Discussed glucose results and potential management including the possibility of Tamiflu for management with risk and benefits of medication discussed and patient declined at this time. Discussed proper use of the cough medicine for his cough symptoms and allergy medicine for management of his other associated symptoms. Discussed expected course  Discussed signs and symptoms requiring medical attention. All questions were answered and patient voiced understanding and agreement with plan as discussed. Results for orders placed or performed in visit on 11/23/22   POCT Influenza A/B   Result Value Ref Range    Influenza A Ab pos (A)     Influenza B Ab neg          Return if symptoms worsen or fail to improve, for next scheduled follow up with PCP. Subjective       HPI   Patient reports that Monday afternoon he started having cough with sore throat congestion as well as runny nose. He has had sore lymph nodes in his neck and his right ear has been popping some. He has had a lot of issues with body aches. He has taken Tylenol in efforts to improve his myalgias but otherwise not been take anything or do anything in efforts to improve his symptoms. Denies any specific aggravating or alleviating factors for symptoms. He denies any fever but does have sick contacts within his family.     Review of Systems Constitutional:  Positive for activity change, chills and fatigue. Negative for appetite change and fever. HENT:  Positive for congestion and rhinorrhea. Negative for sore throat. Eyes:  Negative for pain and discharge. Respiratory:  Positive for cough. Negative for shortness of breath. Cardiovascular:  Negative for chest pain and palpitations. Gastrointestinal:  Negative for abdominal pain, constipation, diarrhea, nausea and vomiting. Endocrine: Negative for cold intolerance and heat intolerance. Genitourinary:  Negative for dysuria and hematuria. Musculoskeletal:  Positive for myalgias. Negative for back pain, gait problem and neck pain. Skin:  Negative for rash and wound. Allergies   Allergen Reactions    Pcn [Penicillins]      States his Mother told him       Outpatient Medications Prior to Visit   Medication Sig Dispense Refill    cyclobenzaprine (FLEXERIL) 10 MG tablet Take 1 tablet by mouth 2 times daily as needed for Muscle spasms 60 tablet 2    acetaminophen (TYLENOL) 325 MG tablet Take 650 mg by mouth every 6 hours as needed for Pain       No facility-administered medications prior to visit.         Past Medical History:   Diagnosis Date    Arthritis     Chronic back pain     sciatica    COPD (chronic obstructive pulmonary disease) ([facility])     Neck pain     HX vertebral fx with hardware        Social History     Tobacco Use    Smoking status: Every Day     Packs/day: 1.50     Years: 30.00     Pack years: 45.00     Types: Cigarettes    Smokeless tobacco: Never   Substance Use Topics    Alcohol use: Yes     Comment: occasionally        Past Surgical History:   Procedure Laterality Date    ABDOMEN SURGERY Left 1/10/2019    EXCISION MASS GROIN performed by Jayshree Martinez MD at [address] [place] [address] N/A 3/1/2017    ACDF C5-6, C6-7 WITH ALLOGRAFT BONE AND ANTERIOR CERVICAL PLATING  performed by Gina Almonte DO at [address] [place] [address] N/A 4/10/2018    Dr Toth Smoker Simeon-Tubular AP (-) dysplasia x 1--5 yr recall    NECK SURGERY      TONSILLECTOMY AND ADENOIDECTOMY         Family History   Problem Relation Age of Onset    Cancer Mother         breast     Cancer Maternal Grandmother         breast     Cancer Father         colon       Objective       /70 (Site: Left Upper Arm, Position: Sitting, Cuff Size: Medium Adult)   Pulse 81   Temp 98.1 °F (36.7 °C) (Temporal)   Ht 5' 10\" (1.778 m)   Wt 193 lb (87.5 kg)   SpO2 97%   BMI 27.69 kg/m²   Physical Exam  Vitals and nursing note reviewed. Constitutional:       Appearance: He is well-developed. HENT:      Head: Normocephalic and atraumatic. Right Ear: Hearing, tympanic membrane, ear canal and external ear normal.      Left Ear: Hearing, tympanic membrane, ear canal and external ear normal.      Nose: Congestion and rhinorrhea present. Mouth/Throat:      Mouth: Mucous membranes are moist.      Pharynx: No oropharyngeal exudate or posterior oropharyngeal erythema. Eyes:      General: No scleral icterus. Right eye: No discharge. Left eye: No discharge. Conjunctiva/sclera: Conjunctivae normal.      Pupils: Pupils are equal, round, and reactive to light. Neck:      Trachea: No tracheal deviation. Cardiovascular:      Rate and Rhythm: Normal rate and regular rhythm. Heart sounds: Normal heart sounds. No murmur heard. No friction rub. No gallop. Pulmonary:      Effort: Pulmonary effort is normal. No respiratory distress. Breath sounds: Normal breath sounds. No wheezing or rales. Abdominal:      General: Bowel sounds are normal. There is no distension. Palpations: Abdomen is soft. Tenderness: There is no abdominal tenderness. Musculoskeletal:         General: No deformity ( no gross deformities of upper or lower extremities bilaterally). Normal range of motion. Cervical back: Normal range of motion and neck supple. Right lower leg: No edema. Left lower leg: No edema. Lymphadenopathy:      Cervical: No cervical adenopathy. Skin:     General: Skin is warm and dry. Findings: No erythema or rash. Neurological:      Mental Status: He is alert and oriented to person, place, and time. Cranial Nerves: No cranial nerve deficit. Motor: No abnormal muscle tone. Psychiatric:         Behavior: Behavior normal.         Thought Content:  Thought content normal.         Data Reviewed and Summarized       Labs:     Imaging/Testing:        Jennifer Lino MD

## 2022-12-02 ENCOUNTER — OFFICE VISIT (OUTPATIENT)
Dept: FAMILY MEDICINE CLINIC | Age: 52
End: 2022-12-02
Payer: COMMERCIAL

## 2022-12-02 VITALS
TEMPERATURE: 97.4 F | HEART RATE: 78 BPM | SYSTOLIC BLOOD PRESSURE: 126 MMHG | DIASTOLIC BLOOD PRESSURE: 68 MMHG | OXYGEN SATURATION: 97 % | BODY MASS INDEX: 27.35 KG/M2 | WEIGHT: 191 LBS | RESPIRATION RATE: 16 BRPM | HEIGHT: 70 IN

## 2022-12-02 DIAGNOSIS — H65.91 FLUID LEVEL BEHIND TYMPANIC MEMBRANE OF RIGHT EAR: ICD-10-CM

## 2022-12-02 DIAGNOSIS — H66.001 NON-RECURRENT ACUTE SUPPURATIVE OTITIS MEDIA OF RIGHT EAR WITHOUT SPONTANEOUS RUPTURE OF TYMPANIC MEMBRANE: Primary | ICD-10-CM

## 2022-12-02 PROCEDURE — G8427 DOCREV CUR MEDS BY ELIG CLIN: HCPCS | Performed by: CLINICAL NURSE SPECIALIST

## 2022-12-02 PROCEDURE — 4004F PT TOBACCO SCREEN RCVD TLK: CPT | Performed by: CLINICAL NURSE SPECIALIST

## 2022-12-02 PROCEDURE — G8419 CALC BMI OUT NRM PARAM NOF/U: HCPCS | Performed by: CLINICAL NURSE SPECIALIST

## 2022-12-02 PROCEDURE — G8484 FLU IMMUNIZE NO ADMIN: HCPCS | Performed by: CLINICAL NURSE SPECIALIST

## 2022-12-02 PROCEDURE — 99213 OFFICE O/P EST LOW 20 MIN: CPT | Performed by: CLINICAL NURSE SPECIALIST

## 2022-12-02 PROCEDURE — 3017F COLORECTAL CA SCREEN DOC REV: CPT | Performed by: CLINICAL NURSE SPECIALIST

## 2022-12-02 RX ORDER — PREDNISONE 20 MG/1
20 TABLET ORAL 2 TIMES DAILY
Qty: 10 TABLET | Refills: 0 | Status: SHIPPED | OUTPATIENT
Start: 2022-12-02 | End: 2022-12-07

## 2022-12-02 RX ORDER — CEFDINIR 300 MG/1
300 CAPSULE ORAL 2 TIMES DAILY
Qty: 20 CAPSULE | Refills: 0 | Status: SHIPPED | OUTPATIENT
Start: 2022-12-02 | End: 2022-12-12

## 2022-12-02 ASSESSMENT — ENCOUNTER SYMPTOMS
EYE DISCHARGE: 0
COUGH: 0
NAUSEA: 0
RHINORRHEA: 0
CHEST TIGHTNESS: 0
SHORTNESS OF BREATH: 0
SORE THROAT: 0
VOMITING: 0
SINUS PRESSURE: 0
FACIAL SWELLING: 0
EYE PAIN: 0
WHEEZING: 0

## 2022-12-02 NOTE — PROGRESS NOTES
SUBJECTIVE:  Ronen Yao is a 47 y.o. who presents today for Otalgia (Right side)      HPI    Mr Suzanne Walsh presents today for an acute visit. He was in last week with influenza. Now with persistent right ear pain, fullness and decreased muffled hearing. No dizziness. No treatment.      Past Medical History:   Diagnosis Date    Arthritis     Chronic back pain     sciatica    COPD (chronic obstructive pulmonary disease) (HCC)     Neck pain     HX vertebral fx with hardware     Past Surgical History:   Procedure Laterality Date    ABDOMEN SURGERY Left 1/10/2019    EXCISION MASS GROIN performed by Maki Alvarez MD at [facility] N/A 3/1/2017    ACDF C5-6, C6-7 WITH ALLOGRAFT BONE AND ANTERIOR CERVICAL PLATING  performed by Jolynn Moss DO at [address] [facility] N/A 4/10/2018    Dr Torie CAMACHO (-) dysplasia x 1--5 yr recall    NECK SURGERY      TONSILLECTOMY AND ADENOIDECTOMY       Family History   Problem Relation Age of Onset    Cancer Mother         breast     Cancer Maternal Grandmother         breast     Cancer Father         colon     Social History     Tobacco Use    Smoking status: Every Day     Packs/day: 1.50     Years: 30.00     Pack years: 45.00     Types: Cigarettes    Smokeless tobacco: Never   Substance Use Topics    Alcohol use: Yes     Comment: occasionally     Current Outpatient Medications   Medication Sig Dispense Refill    cefdinir (OMNICEF) 300 MG capsule Take 1 capsule by mouth 2 times daily for 10 days 20 capsule 0    predniSONE (DELTASONE) 20 MG tablet Take 1 tablet by mouth 2 times daily for 5 days 10 tablet 0    levocetirizine (XYZAL) 5 MG tablet Take 1 tablet by mouth nightly 30 tablet 2    fluticasone (FLONASE) 50 MCG/ACT nasal spray 2 sprays by Each Nostril route daily 16 g 2    acetaminophen (TYLENOL) 325 MG tablet Take 650 mg by mouth every 6 hours as needed for Pain      cyclobenzaprine (FLEXERIL) 10 MG tablet Take 1 tablet by mouth 2 times daily as needed for Muscle spasms 60 tablet 2     No current facility-administered medications for this visit. Allergies   Allergen Reactions    Pcn [Penicillins]      States his Mother told him       Review of Systems   Constitutional:  Negative for appetite change, chills, fatigue and fever. HENT:  Positive for ear pain and hearing loss. Negative for congestion, ear discharge, facial swelling, postnasal drip, rhinorrhea, sinus pressure and sore throat. Eyes:  Negative for pain, discharge and visual disturbance. Respiratory:  Negative for cough, chest tightness, shortness of breath and wheezing. Cardiovascular:  Negative for chest pain. Gastrointestinal:  Negative for nausea and vomiting. Genitourinary:  Negative for dysuria, frequency and urgency. Musculoskeletal:  Negative for arthralgias and myalgias. Neurological:  Negative for weakness, light-headedness and headaches. OBJECTIVE:  /68   Pulse 78   Temp 97.4 °F (36.3 °C) (Temporal)   Resp 16   Ht 5' 10\" (1.778 m)   Wt 191 lb (86.6 kg)   SpO2 97%   BMI 27.41 kg/m²    Physical Exam  Vitals reviewed. Constitutional:       Appearance: He is well-developed. HENT:      Head: Normocephalic and atraumatic. Right Ear: A middle ear effusion is present. Tympanic membrane is erythematous and bulging. Mouth/Throat:      Pharynx: No oropharyngeal exudate. Eyes:      General:         Right eye: No discharge. Left eye: No discharge. Conjunctiva/sclera: Conjunctivae normal.      Pupils: Pupils are equal, round, and reactive to light. Cardiovascular:      Rate and Rhythm: Normal rate and regular rhythm. Pulmonary:      Effort: Pulmonary effort is normal. No respiratory distress. Breath sounds: Normal breath sounds. No wheezing or rales. Musculoskeletal:         General: Normal range of motion. Cervical back: Neck supple. Lymphadenopathy:      Cervical: No cervical adenopathy.    Skin: General: Skin is warm and dry. Findings: No rash. Neurological:      Mental Status: He is alert and oriented to person, place, and time. ASSESSMENT/PLAN:  1. Non-recurrent acute suppurative otitis media of right ear without spontaneous rupture of tympanic membrane  - cefdinir (OMNICEF) 300 MG capsule; Take 1 capsule by mouth 2 times daily for 10 days  Dispense: 20 capsule; Refill: 0  - predniSONE (DELTASONE) 20 MG tablet; Take 1 tablet by mouth 2 times daily for 5 days  Dispense: 10 tablet; Refill: 0    2. Fluid level behind tympanic membrane of right ear  sudafed  - predniSONE (DELTASONE) 20 MG tablet; Take 1 tablet by mouth 2 times daily for 5 days  Dispense: 10 tablet; Refill: 0       Return 7-10 days if unimproved     Return if symptoms worsen or fail to improve.

## 2022-12-18 ASSESSMENT — ENCOUNTER SYMPTOMS
SHORTNESS OF BREATH: 0
NAUSEA: 0
ABDOMINAL PAIN: 0
EYE DISCHARGE: 0
DIARRHEA: 0
SORE THROAT: 0
COUGH: 1
RHINORRHEA: 1
CONSTIPATION: 0
EYE PAIN: 0
VOMITING: 0
BACK PAIN: 0

## 2023-10-03 ENCOUNTER — APPOINTMENT (OUTPATIENT)
Dept: CT IMAGING | Facility: HOSPITAL | Age: 53
End: 2023-10-03
Payer: COMMERCIAL

## 2023-10-03 ENCOUNTER — HOSPITAL ENCOUNTER (EMERGENCY)
Facility: HOSPITAL | Age: 53
Discharge: HOME | End: 2023-10-03
Attending: FAMILY MEDICINE | Admitting: FAMILY MEDICINE
Payer: COMMERCIAL

## 2023-10-03 VITALS
TEMPERATURE: 98 F | WEIGHT: 195 LBS | SYSTOLIC BLOOD PRESSURE: 131 MMHG | HEART RATE: 83 BPM | RESPIRATION RATE: 18 BRPM | DIASTOLIC BLOOD PRESSURE: 90 MMHG | HEIGHT: 70 IN | BODY MASS INDEX: 27.92 KG/M2 | OXYGEN SATURATION: 97 %

## 2023-10-03 DIAGNOSIS — M51.36 LUMBAR DEGENERATIVE DISC DISEASE: ICD-10-CM

## 2023-10-03 DIAGNOSIS — M54.16 LUMBAR RADICULOPATHY: Primary | ICD-10-CM

## 2023-10-03 DIAGNOSIS — M62.830 LUMBAR PARASPINAL MUSCLE SPASM: ICD-10-CM

## 2023-10-03 PROCEDURE — 96376 TX/PRO/DX INJ SAME DRUG ADON: CPT

## 2023-10-03 PROCEDURE — 96375 TX/PRO/DX INJ NEW DRUG ADDON: CPT

## 2023-10-03 PROCEDURE — 25010000002 ONDANSETRON PER 1 MG: Performed by: FAMILY MEDICINE

## 2023-10-03 PROCEDURE — 25010000002 KETOROLAC TROMETHAMINE PER 15 MG: Performed by: FAMILY MEDICINE

## 2023-10-03 PROCEDURE — 96374 THER/PROPH/DIAG INJ IV PUSH: CPT

## 2023-10-03 PROCEDURE — 25010000002 MORPHINE PER 10 MG: Performed by: FAMILY MEDICINE

## 2023-10-03 PROCEDURE — 25010000002 DEXAMETHASONE PER 1 MG: Performed by: FAMILY MEDICINE

## 2023-10-03 PROCEDURE — 72131 CT LUMBAR SPINE W/O DYE: CPT

## 2023-10-03 PROCEDURE — 99284 EMERGENCY DEPT VISIT MOD MDM: CPT

## 2023-10-03 PROCEDURE — 96372 THER/PROPH/DIAG INJ SC/IM: CPT

## 2023-10-03 PROCEDURE — 25010000002 ORPHENADRINE CITRATE PER 60 MG: Performed by: FAMILY MEDICINE

## 2023-10-03 RX ORDER — MORPHINE SULFATE 2 MG/ML
2 INJECTION, SOLUTION INTRAMUSCULAR; INTRAVENOUS ONCE
Status: COMPLETED | OUTPATIENT
Start: 2023-10-03 | End: 2023-10-03

## 2023-10-03 RX ORDER — ORPHENADRINE CITRATE 30 MG/ML
60 INJECTION INTRAMUSCULAR; INTRAVENOUS ONCE
Status: COMPLETED | OUTPATIENT
Start: 2023-10-03 | End: 2023-10-03

## 2023-10-03 RX ORDER — DEXAMETHASONE SODIUM PHOSPHATE 10 MG/ML
10 INJECTION INTRAMUSCULAR; INTRAVENOUS ONCE
Status: COMPLETED | OUTPATIENT
Start: 2023-10-03 | End: 2023-10-03

## 2023-10-03 RX ORDER — DEXAMETHASONE 4 MG/1
4 TABLET ORAL
Qty: 5 TABLET | Refills: 0 | Status: SHIPPED | OUTPATIENT
Start: 2023-10-04 | End: 2023-10-09

## 2023-10-03 RX ORDER — TRAMADOL HYDROCHLORIDE 50 MG/1
50 TABLET ORAL EVERY 8 HOURS PRN
Qty: 12 TABLET | Refills: 0 | Status: SHIPPED | OUTPATIENT
Start: 2023-10-03 | End: 2023-10-07

## 2023-10-03 RX ORDER — ONDANSETRON 2 MG/ML
4 INJECTION INTRAMUSCULAR; INTRAVENOUS ONCE
Status: COMPLETED | OUTPATIENT
Start: 2023-10-03 | End: 2023-10-03

## 2023-10-03 RX ORDER — KETOROLAC TROMETHAMINE 30 MG/ML
30 INJECTION, SOLUTION INTRAMUSCULAR; INTRAVENOUS ONCE
Status: COMPLETED | OUTPATIENT
Start: 2023-10-03 | End: 2023-10-03

## 2023-10-03 RX ORDER — TIZANIDINE HYDROCHLORIDE 4 MG/1
4 CAPSULE, GELATIN COATED ORAL 3 TIMES DAILY
Qty: 21 CAPSULE | Refills: 0 | Status: SHIPPED | OUTPATIENT
Start: 2023-10-03 | End: 2023-10-10

## 2023-10-03 RX ADMIN — ORPHENADRINE CITRATE 60 MG: 60 INJECTION INTRAMUSCULAR; INTRAVENOUS at 15:47

## 2023-10-03 RX ADMIN — MORPHINE SULFATE 2 MG: 2 INJECTION, SOLUTION INTRAMUSCULAR; INTRAVENOUS at 14:18

## 2023-10-03 RX ADMIN — ONDANSETRON 4 MG: 2 INJECTION INTRAMUSCULAR; INTRAVENOUS at 14:18

## 2023-10-03 RX ADMIN — KETOROLAC TROMETHAMINE 30 MG: 30 INJECTION, SOLUTION INTRAMUSCULAR; INTRAVENOUS at 14:20

## 2023-10-03 RX ADMIN — DEXAMETHASONE SODIUM PHOSPHATE 10 MG: 10 INJECTION INTRAMUSCULAR; INTRAVENOUS at 14:20

## 2023-10-03 RX ADMIN — MORPHINE SULFATE 4 MG: 4 INJECTION, SOLUTION INTRAMUSCULAR; INTRAVENOUS at 15:48

## 2023-10-03 NOTE — ED PROVIDER NOTES
HPI:    Patient is a 53-year-old white male who presents to the emergency room with a complaint of low back pain that started last night.  Patient states he was doing a load of laundry when he bent over and tried to stand and then he felt a pull and pop in his low back.  Patient states the pain was 10 out of 10.  He stated that he did heating pad and tried to deal with it at home but he continued to progressively get worse with pain radiating down his left leg to his foot.  Patient states he has had problems with his back in the past but this got much worse.  Patient denies any loss of bowel or bladder control.  Pain currently 9 out of 10      REVIEW OF SYSTEMS  CONSTITUTIONAL:  No complaints of fever, chills,or weakness  EYES:  No complaints of discharge   ENT: No complaints of sore throat or ear pain  CARDIOVASCULAR:  No complaints of chest pain, palpitations, or swelling  RESPIRATORY:  No complaints of cough or shortness of breath  GI:  No complaints of abdominal pain, nausea, vomiting, or diarrhea  MUSCULOSKELETAL: Positive for low back pain SKIN:  No complaints of rash  NEUROLOGIC:  No complaints of headache, focal weakness, or sensory changes  ENDOCRINE:  No complaints of polyuria or polydipsia  LYMPHATIC:  No complaints of swollen glands  GENITOURINARY: No complaints of urinary frequency or hematuria        PAST MEDICAL HISTORY  History reviewed. No pertinent past medical history.    FAMILY HISTORY  History reviewed. No pertinent family history.    SOCIAL HISTORY  Social History     Socioeconomic History    Marital status:    Tobacco Use    Smoking status: Every Day     Packs/day: 1.00     Years: 30.00     Pack years: 30.00     Types: Cigarettes    Smokeless tobacco: Never   Vaping Use    Vaping Use: Never used   Substance and Sexual Activity    Drug use: Never       IMMUNIZATION HISTORY  Deferred to primary care physician.    SURGICAL HISTORY  Past Surgical History:   Procedure Laterality Date     "CERVICAL FUSION      NECK SURGERY         CURRENT MEDICATIONS  No current facility-administered medications for this encounter.    Current Outpatient Medications:     [START ON 10/4/2023] dexAMETHasone (DECADRON) 4 MG tablet, Take 1 tablet by mouth Daily With Breakfast for 5 days., Disp: 5 tablet, Rfl: 0    TiZANidine (Zanaflex) 4 MG capsule, Take 1 capsule by mouth 3 (Three) Times a Day for 7 days., Disp: 21 capsule, Rfl: 0    traMADol (ULTRAM) 50 MG tablet, Take 1 tablet by mouth Every 8 (Eight) Hours As Needed for Moderate Pain for up to 4 days., Disp: 12 tablet, Rfl: 0    ALLERGIES  Allergies   Allergen Reactions    Penicillins Unknown - Low Severity       Neuro exam    VITAL SIGNS:   /80   Pulse 70   Temp 98.2 °F (36.8 °C) (Oral)   Resp 18   Ht 177.8 cm (70\")   Wt 88.5 kg (195 lb)   SpO2 95%   BMI 27.98 kg/m²     Constitutional: Patient is alert and in no distress.  Patient with severe low back discomfort.    Cardiovascular: S1-S2 regular rate and rhythm no murmurs rubs or gallops is noted.    Respiratory: Patient is clear to auscultation bilaterally with no wheezing or rhonchi.  Chest wall is nontender.  There is no external lesions on the chest.  There is no crepitance    Abdomen: Soft nontender bowel sounds are normal in all 4 quadrants there is no rebound or guarding noted.  There is no abdominal distention or hepatosplenomegaly.    Integumentary: No acute changes noted    Genitourinary: Patient is voiding appropriately.    Integument: No acute lesions noted color appears to be normal.    Musculoskeletal: Back: There is tenderness palpation of the low back over the lumbar area and just to the left of the lumbar area.  Positive straight leg raise at 30 degrees with significant pain on the left with raising of the right leg and the left leg.  There is no pain on the right side with raising the right or left leg.    Neurological: CNS 2 through 12 is grossly intact there is no focal deficit.  Sharp " pain radiating from low back down the left leg to the foot.        RADIOLOGY/PROCEDURES      CT Lumbar Spine Without Contrast   Final Result   1.  No acute osseous finding.   2.  Mild multilevel degenerative change, as described above. No area of   high-grade central canal or neural foraminal stenosis by CT.       This report was signed and finalized on 10/3/2023 3:05 PM CDT by Dr. Nasir Ledezma MD.                  FUTURE APPOINTMENTS     No future appointments.       COURSE & MEDICAL DECISION MAKING     Patient's partial differential diagnosis can include: Left-sided sciatica, lumbar go with left-sided radiculopathy of the lower extremity, herniated lumbar disc, lumbar nerve impingement, and other      Radiological test has been discussed with the patient.  Explained to him that there is no significant finding of subluxation or bulging of the disc.  There is a small disc that was noted at L2-L3 but not substantial.  Patient's pain is mildly improved after the IV pain medications that he has received.  We will send the patient home with pain medication as well as muscle relaxers.  If he starts developing any signs and symptoms where he starts losing bowel control or bladder control he is to immediately come to the emergency room for further evaluation.  Explained to him that if his pain does not start improving after 5 to 6 days would recommend his primary care provider referred him to orthopedics or neurology for potentially an MRI of the low back.    Patient's level of risk: Moderate        CRITICAL CARE    CRITICAL CARE: No    CRITICAL CARE TIME: None        Patient was hemodynamically and neurologically stable in the ED.   Pertinent studies were reviewed as above.     No results found for this or any previous visit (from the past 24 hour(s)).      The patient received:  Medications   dexAMETHasone (DECADRON) injection 10 mg (10 mg Intravenous Given 10/3/23 7860)   ketorolac (TORADOL) injection 30 mg (30 mg  Intravenous Given 10/3/23 1420)   Morphine sulfate (PF) injection 2 mg (2 mg Intravenous Given 10/3/23 1418)   ondansetron (ZOFRAN) injection 4 mg (4 mg Intravenous Given 10/3/23 1418)   orphenadrine (NORFLEX) injection 60 mg (60 mg Intramuscular Given 10/3/23 1547)   morphine injection 4 mg (4 mg Intravenous Given 10/3/23 1548)              ED Disposition       ED Disposition   Discharge    Condition   Stable    Comment   --                   Dragon disclaimer:  Part of this note may be an electronic transcription/translation of spoken language to printed text using the Dragon Dictation System. Due to this some dictation errors could occur in the chart.    This information is consistent with my knowledge of the patient’s controlled substance use history.    Patient evaluate during Coronavirus Pandemic. Isolation practices followed according to [facility] policy.    FINAL IMPRESSION   Diagnosis Plan   1. Lumbar radiculopathy  traMADol (ULTRAM) 50 MG tablet      2. Lumbar paraspinal muscle spasm  traMADol (ULTRAM) 50 MG tablet      3. Lumbar degenerative disc disease  traMADol (ULTRAM) 50 MG tablet            MD Bang Ruby Jr, Thomas Mark Jr., MD  10/03/23 4829

## 2023-10-03 NOTE — Clinical Note
[facility] EMERGENCY DEPARTMENT  [address] [place] [address] AVE  [facility] [address]  Phone: [phone]    Shnae Horn was seen and treated in our emergency department on 10/3/2023.  He may return to work on 10/07/2023.         Thank you for choosing [facility].    Agustin Cuenca Jr., MD

## 2023-10-03 NOTE — DISCHARGE INSTRUCTIONS
No lifting over 10 pounds. If pain persists or you start losing bowel or bladder control then you need to return asap

## 2023-10-24 ENCOUNTER — OFFICE VISIT (OUTPATIENT)
Dept: FAMILY MEDICINE CLINIC | Age: 53
End: 2023-10-24
Payer: COMMERCIAL

## 2023-10-24 VITALS
HEART RATE: 99 BPM | SYSTOLIC BLOOD PRESSURE: 122 MMHG | OXYGEN SATURATION: 98 % | TEMPERATURE: 98.8 F | DIASTOLIC BLOOD PRESSURE: 82 MMHG | WEIGHT: 193 LBS | BODY MASS INDEX: 27.69 KG/M2 | RESPIRATION RATE: 17 BRPM

## 2023-10-24 DIAGNOSIS — G89.29 CHRONIC LEFT-SIDED LOW BACK PAIN WITH LEFT-SIDED SCIATICA: Primary | Chronic | ICD-10-CM

## 2023-10-24 DIAGNOSIS — M54.16 LUMBAR RADICULOPATHY: ICD-10-CM

## 2023-10-24 DIAGNOSIS — M54.42 CHRONIC LEFT-SIDED LOW BACK PAIN WITH LEFT-SIDED SCIATICA: Primary | Chronic | ICD-10-CM

## 2023-10-24 PROCEDURE — G8427 DOCREV CUR MEDS BY ELIG CLIN: HCPCS | Performed by: CLINICAL NURSE SPECIALIST

## 2023-10-24 PROCEDURE — 96372 THER/PROPH/DIAG INJ SC/IM: CPT | Performed by: CLINICAL NURSE SPECIALIST

## 2023-10-24 PROCEDURE — G8484 FLU IMMUNIZE NO ADMIN: HCPCS | Performed by: CLINICAL NURSE SPECIALIST

## 2023-10-24 PROCEDURE — 4004F PT TOBACCO SCREEN RCVD TLK: CPT | Performed by: CLINICAL NURSE SPECIALIST

## 2023-10-24 PROCEDURE — 3017F COLORECTAL CA SCREEN DOC REV: CPT | Performed by: CLINICAL NURSE SPECIALIST

## 2023-10-24 PROCEDURE — G8419 CALC BMI OUT NRM PARAM NOF/U: HCPCS | Performed by: CLINICAL NURSE SPECIALIST

## 2023-10-24 PROCEDURE — 99214 OFFICE O/P EST MOD 30 MIN: CPT | Performed by: CLINICAL NURSE SPECIALIST

## 2023-10-24 RX ORDER — TIZANIDINE 4 MG/1
4 TABLET ORAL 3 TIMES DAILY PRN
Qty: 90 TABLET | Refills: 1 | Status: SHIPPED | OUTPATIENT
Start: 2023-10-24

## 2023-10-24 RX ORDER — PREDNISONE 20 MG/1
20 TABLET ORAL 2 TIMES DAILY
Qty: 10 TABLET | Refills: 0 | Status: SHIPPED | OUTPATIENT
Start: 2023-10-24 | End: 2023-10-29

## 2023-10-24 RX ORDER — TRIAMCINOLONE ACETONIDE 40 MG/ML
40 INJECTION, SUSPENSION INTRA-ARTICULAR; INTRAMUSCULAR ONCE
Status: COMPLETED | OUTPATIENT
Start: 2023-10-24 | End: 2023-10-24

## 2023-10-24 RX ADMIN — TRIAMCINOLONE ACETONIDE 40 MG: 40 INJECTION, SUSPENSION INTRA-ARTICULAR; INTRAMUSCULAR at 10:21

## 2023-10-24 ASSESSMENT — ENCOUNTER SYMPTOMS
COUGH: 1
BACK PAIN: 1

## 2023-10-24 NOTE — PROGRESS NOTES
After obtaining consent, and per orders of Dr. Cayden Clarke, injection of Ken40 given in Left deltoid by Ronda Crawford MA. Patient instructed to remain in clinic for 20 minutes afterwards, and to report any adverse reaction to me immediately.

## 2023-10-24 NOTE — PROGRESS NOTES
SUBJECTIVE:  Mara Lozano is a 47 y.o. who presents today for Lower Back Pain (Went to ER [facility]) a couple weeks ago, CT showed small protrusion, been coughing and exacerbated it this weekend. )      HPI    Mr Jony Cazares presents today for an acute visit. He has chronic lower back pain due to DDD of lumbar spine. Was doing well but pain worsened earlier this month. He did go to [facility] ER. CT lumbar spine with findings consistent with DDD. No acute fracture. He improved for about 2 weeks following steroids and tizanidine use. Over the weekend he developed an acute cough which caused an increase in his lower back pain, greater on left and into leg. Taking dayquil and using cough drops for cough and this is better.     Past Medical History:   Diagnosis Date    Arthritis     Chronic back pain     sciatica    COPD (chronic obstructive pulmonary disease) (HCC)     Neck pain     HX vertebral fx with hardware     Past Surgical History:   Procedure Laterality Date    ABDOMEN SURGERY Left 1/10/2019    EXCISION MASS GROIN performed by Maria M Yao MD at [address] [place] [address] N/A 3/1/2017    ACDF C5-6, C6-7 WITH ALLOGRAFT BONE AND ANTERIOR CERVICAL PLATING  performed by Mike Tellez DO at [facility] OR    COLONOSCOPY N/A 4/10/2018    Dr Wilmer Evans AP (-) dysplasia x 1--5 yr recall    NECK SURGERY      TONSILLECTOMY AND ADENOIDECTOMY       Family History   Problem Relation Age of Onset    Cancer Mother         breast     Cancer Maternal Grandmother         breast     Cancer Father         colon     Social History     Tobacco Use    Smoking status: Every Day     Packs/day: 1.50     Years: 30.00     Additional pack years: 0.00     Total pack years: 45.00     Types: Cigarettes    Smokeless tobacco: Never   Substance Use Topics    Alcohol use: Yes     Comment: occasionally     Current Outpatient Medications   Medication Sig Dispense Refill    predniSONE (DELTASONE) 20 MG tablet Take 1 tablet by mouth 2

## 2023-11-08 ENCOUNTER — OFFICE VISIT (OUTPATIENT)
Dept: FAMILY MEDICINE CLINIC | Age: 53
End: 2023-11-08
Payer: COMMERCIAL

## 2023-11-08 VITALS
BODY MASS INDEX: 26.69 KG/M2 | DIASTOLIC BLOOD PRESSURE: 78 MMHG | HEART RATE: 70 BPM | SYSTOLIC BLOOD PRESSURE: 126 MMHG | RESPIRATION RATE: 17 BRPM | OXYGEN SATURATION: 97 % | TEMPERATURE: 98.4 F | WEIGHT: 186 LBS

## 2023-11-08 DIAGNOSIS — D72.829 LEUKOCYTOSIS, UNSPECIFIED TYPE: Primary | ICD-10-CM

## 2023-11-08 PROCEDURE — G8419 CALC BMI OUT NRM PARAM NOF/U: HCPCS | Performed by: CLINICAL NURSE SPECIALIST

## 2023-11-08 PROCEDURE — 3017F COLORECTAL CA SCREEN DOC REV: CPT | Performed by: CLINICAL NURSE SPECIALIST

## 2023-11-08 PROCEDURE — 99213 OFFICE O/P EST LOW 20 MIN: CPT | Performed by: CLINICAL NURSE SPECIALIST

## 2023-11-08 PROCEDURE — G8484 FLU IMMUNIZE NO ADMIN: HCPCS | Performed by: CLINICAL NURSE SPECIALIST

## 2023-11-08 PROCEDURE — G8427 DOCREV CUR MEDS BY ELIG CLIN: HCPCS | Performed by: CLINICAL NURSE SPECIALIST

## 2023-11-08 PROCEDURE — 4004F PT TOBACCO SCREEN RCVD TLK: CPT | Performed by: CLINICAL NURSE SPECIALIST

## 2023-11-08 RX ORDER — DOXYCYCLINE HYCLATE 100 MG
100 TABLET ORAL 2 TIMES DAILY
Qty: 14 TABLET | Refills: 0 | Status: SHIPPED | OUTPATIENT
Start: 2023-11-08 | End: 2023-11-15

## 2023-11-08 ASSESSMENT — ENCOUNTER SYMPTOMS
ABDOMINAL PAIN: 0
COUGH: 1
CHEST TIGHTNESS: 0
NAUSEA: 0
EYE DISCHARGE: 0
DIARRHEA: 0
TROUBLE SWALLOWING: 0
BACK PAIN: 1
SHORTNESS OF BREATH: 0
EYE PAIN: 0
CONSTIPATION: 0
WHEEZING: 0
SORE THROAT: 0
COLOR CHANGE: 0
SINUS PRESSURE: 0
VOMITING: 0

## 2023-11-08 NOTE — PROGRESS NOTES
SUBJECTIVE:  Milton Vargas is a 47 y.o. who presents today for Follow-up (Has a physical for work yesterday - unusual lab results and they told him to get check up again.)      HPI    Mr Cong Reina presents today for follow up. He had employment physical yesterday with labs. Results are scanned to media. White count 22.6 with mild shift in neutrophils. No active illness. He does have chronic cough with mucous, has been worse at times but not acutely worse this week. No skin rashes. No recent procedures or injections. No abdominal pain, n/v/d  No sore throat. No fever or chills. No  symptoms. He does have chronic lower back pain. CT lumbar spine noted October. Recently completed steroids for his pain.      CXR yesterday WNL    Past Medical History:   Diagnosis Date    Arthritis     Chronic back pain     sciatica    COPD (chronic obstructive pulmonary disease) ([address])     Neck pain     HX vertebral fx with hardware     Past Surgical History:   Procedure Laterality Date    ABDOMEN SURGERY Left 1/10/2019    EXCISION MASS GROIN performed by Elsa Chow MD at [address] [place] [address] N/A 3/1/2017    ACDF C5-6, C6-7 WITH ALLOGRAFT BONE AND ANTERIOR CERVICAL PLATING  performed by Joya Schlatter, DO at [facility] OR    COLONOSCOPY N/A 4/10/2018    Dr Greg CAMACHO (-) dysplasia x 1--5 yr recall    NECK SURGERY      TONSILLECTOMY AND ADENOIDECTOMY       Family History   Problem Relation Age of Onset    Cancer Mother         breast     Cancer Maternal Grandmother         breast     Cancer Father         colon     Social History     Tobacco Use    Smoking status: Every Day     Packs/day: 1.50     Years: 30.00     Additional pack years: 0.00     Total pack years: 45.00     Types: Cigarettes    Smokeless tobacco: Never   Substance Use Topics    Alcohol use: Yes     Comment: occasionally     Current Outpatient Medications   Medication Sig Dispense Refill    doxycycline hyclate (VIBRA-TABS) 100 MG

## 2023-11-16 DIAGNOSIS — D72.829 LEUKOCYTOSIS, UNSPECIFIED TYPE: ICD-10-CM

## 2023-11-16 LAB
BASOPHILS # BLD: 0 K/UL (ref 0–0.2)
BASOPHILS NFR BLD: 0.2 % (ref 0–1)
EOSINOPHIL # BLD: 0.1 K/UL (ref 0–0.6)
EOSINOPHIL NFR BLD: 0.7 % (ref 0–5)
ERYTHROCYTE [DISTWIDTH] IN BLOOD BY AUTOMATED COUNT: 13.4 % (ref 11.5–14.5)
HCT VFR BLD AUTO: 44.1 % (ref 42–52)
HGB BLD-MCNC: 14.5 G/DL (ref 14–18)
IMM GRANULOCYTES # BLD: 0.3 K/UL
LYMPHOCYTES # BLD: 4 K/UL (ref 1.1–4.5)
LYMPHOCYTES NFR BLD: 24.5 % (ref 20–40)
MCH RBC QN AUTO: 31.3 PG (ref 27–31)
MCHC RBC AUTO-ENTMCNC: 32.9 G/DL (ref 33–37)
MCV RBC AUTO: 95 FL (ref 80–94)
MONOCYTES # BLD: 0.8 K/UL (ref 0–0.9)
MONOCYTES NFR BLD: 5.1 % (ref 0–10)
NEUTROPHILS # BLD: 11.1 K/UL (ref 1.5–7.5)
NEUTS SEG NFR BLD: 67.7 % (ref 50–65)
PLATELET # BLD AUTO: 263 K/UL (ref 130–400)
PMV BLD AUTO: 10.1 FL (ref 9.4–12.4)
RBC # BLD AUTO: 4.64 M/UL (ref 4.7–6.1)
WBC # BLD AUTO: 16.4 K/UL (ref 4.8–10.8)

## 2023-11-30 DIAGNOSIS — D72.829 LEUKOCYTOSIS, UNSPECIFIED TYPE: Primary | ICD-10-CM

## 2023-12-12 ENCOUNTER — TELEPHONE (OUTPATIENT)
Dept: HEMATOLOGY | Age: 53
End: 2023-12-12

## 2023-12-12 DIAGNOSIS — D72.828 OTHER ELEVATED WHITE BLOOD CELL (WBC) COUNT: Primary | ICD-10-CM

## 2023-12-12 NOTE — PROGRESS NOTES
Initial Consult Note      Pt Name: Bear Raya: 1970  MRN: [id number]    Date of evaluation: 2023  History Obtained From:  patient, electronic medical record    CHIEF COMPLAINT:    Chief Complaint   Patient presents with    New Patient     Leukocytosis,unspecified type     HISTORY OF PRESENT ILLNESS:    Tanya Solis is a 47 y.o.  male who is today in initial hematology consultation for leukocytosis, referred by NOEMI Gerard for evaluation and treatment recommendations. Serg Cabrera has a medical history to include chronic back and neck pain, arthritis, chronic obstructive pulmonary disease, and tobacco use. He presented with complaints of allergy symptoms (runny nose and sneezing), chronic fatigue and drenching night sweats that occur intermittently over the past 6 months. He denied any unintentional weight loss, febrile illness or noted enlarged lymph nodes. He denied any NSAID use and reported that he takes 6-8 Tylenol daily for pain control. He denied any known family history of hematologic malignancies although significant family history of carcinoma to include his mother and maternal grandmother are both  from breast cancer and his biological father had colon cancer. Serg Cabrera denied alcohol use. He has no known history of renal or liver disease. I reviewed the following findings at initial consultation on 2023:  CBC review revealed persistent leukocytosis without anemia or thrombocytopenia dating back to 2023 Chest xray- reported no lung consolidation. No pleural effusion or pneumothorax. The   cardiomediastinal silhouette and pulmonary vascularity are within normal limits. The osseous structures and surrounding soft tissues demonstrate no acute abnormality.      2023 Serology results ([facility])  WBC 22.6, Hgb 16.5, Hct 48.9, MCV 90, platelets 895,873  Ca 9.7  Creat 0.87,   Bili 0.4  Alk Phos 84  AST

## 2023-12-12 NOTE — TELEPHONE ENCOUNTER
Called patient and reminded patient of their appointment on 12/13/2023 and patient confirmed they would be here.

## 2023-12-13 ENCOUNTER — OFFICE VISIT (OUTPATIENT)
Dept: HEMATOLOGY | Age: 53
End: 2023-12-13
Payer: COMMERCIAL

## 2023-12-13 ENCOUNTER — HOSPITAL ENCOUNTER (OUTPATIENT)
Dept: INFUSION THERAPY | Age: 53
Discharge: HOME | End: 2023-12-13
Payer: COMMERCIAL

## 2023-12-13 VITALS
HEIGHT: 70 IN | WEIGHT: 184.4 LBS | SYSTOLIC BLOOD PRESSURE: 128 MMHG | DIASTOLIC BLOOD PRESSURE: 74 MMHG | BODY MASS INDEX: 26.4 KG/M2 | OXYGEN SATURATION: 97 % | HEART RATE: 84 BPM | TEMPERATURE: 98.5 F

## 2023-12-13 DIAGNOSIS — R10.32 INTERMITTENT LEFT LOWER QUADRANT ABDOMINAL PAIN: Primary | ICD-10-CM

## 2023-12-13 DIAGNOSIS — D72.828 OTHER ELEVATED WHITE BLOOD CELL (WBC) COUNT: ICD-10-CM

## 2023-12-13 DIAGNOSIS — G89.29 CHRONIC LEFT-SIDED LOW BACK PAIN WITH LEFT-SIDED SCIATICA: Chronic | ICD-10-CM

## 2023-12-13 DIAGNOSIS — M54.2 CHRONIC NECK PAIN: ICD-10-CM

## 2023-12-13 DIAGNOSIS — Z72.0 TOBACCO USE: ICD-10-CM

## 2023-12-13 DIAGNOSIS — M54.42 CHRONIC LEFT-SIDED LOW BACK PAIN WITH LEFT-SIDED SCIATICA: Chronic | ICD-10-CM

## 2023-12-13 DIAGNOSIS — G89.29 CHRONIC NECK PAIN: ICD-10-CM

## 2023-12-13 LAB
BASOPHILS # BLD: 0.04 K/UL (ref 0.01–0.08)
BASOPHILS NFR BLD: 0.3 % (ref 0.1–1.2)
CRP SERPL HS-MCNC: 1.18 MG/DL (ref 0–0.5)
EOSINOPHIL # BLD: 0.09 K/UL (ref 0.04–0.54)
EOSINOPHIL NFR BLD: 0.7 % (ref 0.7–7)
ERYTHROCYTE [DISTWIDTH] IN BLOOD BY AUTOMATED COUNT: 13.2 % (ref 11.6–14.4)
ERYTHROCYTE [SEDIMENTATION RATE] IN BLOOD BY WESTERGREN METHOD: 18 MM/HR (ref 0–15)
HCT VFR BLD AUTO: 43.4 % (ref 40.1–51)
HGB BLD-MCNC: 15 G/DL (ref 13.7–17.5)
LYMPHOCYTES # BLD: 3.5 K/UL (ref 1.18–3.74)
LYMPHOCYTES NFR BLD: 26.9 % (ref 19.3–53.1)
MCH RBC QN AUTO: 30.7 PG (ref 25.7–32.2)
MCHC RBC AUTO-ENTMCNC: 34.6 G/DL (ref 32.3–36.5)
MCV RBC AUTO: 88.8 FL (ref 79–92.2)
MONOCYTES # BLD: 0.57 K/UL (ref 0.24–0.82)
MONOCYTES NFR BLD: 4.4 % (ref 4.7–12.5)
NEUTROPHILS # BLD: 8.7 K/UL (ref 1.56–6.13)
NEUTS SEG NFR BLD: 67 % (ref 34–71.1)
PLATELET # BLD AUTO: 268 K/UL (ref 163–337)
PMV BLD AUTO: 9.7 FL (ref 7.4–10.4)
RBC # BLD AUTO: 4.89 M/UL (ref 4.63–6.08)
RHEUMATOID FACT SER NEPH-ACNC: <10 IU/ML
WBC # BLD AUTO: 12.99 K/UL (ref 4.23–9.07)

## 2023-12-13 PROCEDURE — G8419 CALC BMI OUT NRM PARAM NOF/U: HCPCS | Performed by: NURSE PRACTITIONER

## 2023-12-13 PROCEDURE — G8427 DOCREV CUR MEDS BY ELIG CLIN: HCPCS | Performed by: NURSE PRACTITIONER

## 2023-12-13 PROCEDURE — 36415 COLL VENOUS BLD VENIPUNCTURE: CPT | Performed by: NURSE PRACTITIONER

## 2023-12-13 PROCEDURE — 3017F COLORECTAL CA SCREEN DOC REV: CPT | Performed by: NURSE PRACTITIONER

## 2023-12-13 PROCEDURE — 99212 OFFICE O/P EST SF 10 MIN: CPT

## 2023-12-13 PROCEDURE — G8484 FLU IMMUNIZE NO ADMIN: HCPCS | Performed by: NURSE PRACTITIONER

## 2023-12-13 PROCEDURE — 4004F PT TOBACCO SCREEN RCVD TLK: CPT | Performed by: NURSE PRACTITIONER

## 2023-12-13 PROCEDURE — 99205 OFFICE O/P NEW HI 60 MIN: CPT | Performed by: NURSE PRACTITIONER

## 2023-12-13 PROCEDURE — 36415 COLL VENOUS BLD VENIPUNCTURE: CPT

## 2023-12-13 PROCEDURE — 85025 COMPLETE CBC W/AUTO DIFF WBC: CPT

## 2023-12-13 ASSESSMENT — PROMIS GLOBAL HEALTH SCALE
IN GENERAL, WOULD YOU SAY YOUR QUALITY OF LIFE IS...[ON A SCALE OF 1 (POOR) TO 5 (EXCELLENT)]: 3
IN THE PAST 7 DAYS, HOW WOULD YOU RATE YOUR FATIGUE ON AVERAGE [ON A SCALE FROM 1 (NONE) TO 5 (VERY SEVERE)]?: 4
IN GENERAL, HOW WOULD YOU RATE YOUR PHYSICAL HEALTH [ON A SCALE OF 1 (POOR) TO 5 (EXCELLENT)]?: 3
IN THE PAST 7 DAYS, HOW WOULD YOU RATE YOUR PAIN ON AVERAGE [ON A SCALE FROM 0 (NO PAIN) TO 10 (WORST IMAGINABLE PAIN)]?: 3
IN GENERAL, WOULD YOU SAY YOUR HEALTH IS...[ON A SCALE OF 1 (POOR) TO 5 (EXCELLENT)]: 3
IN THE PAST 7 DAYS, HOW OFTEN HAVE YOU BEEN BOTHERED BY EMOTIONAL PROBLEMS, SUCH AS FEELING ANXIOUS, DEPRESSED, OR IRRITABLE [ON A SCALE FROM 1 (NEVER) TO 5 (ALWAYS)]?: 5
IN GENERAL, HOW WOULD YOU RATE YOUR SATISFACTION WITH YOUR SOCIAL ACTIVITIES AND RELATIONSHIPS [ON A SCALE OF 1 (POOR) TO 5 (EXCELLENT)]?: 3
IN GENERAL, PLEASE RATE HOW WELL YOU CARRY OUT YOUR USUAL SOCIAL ACTIVITIES (INCLUDES ACTIVITIES AT HOME, AT WORK, AND IN YOUR COMMUNITY, AND RESPONSIBILITIES AS A PARENT, CHILD, SPOUSE, EMPLOYEE, FRIEND, ETC) [ON A SCALE OF 1 (POOR) TO 5 (EXCELLENT)]?: 3
SUM OF RESPONSES TO QUESTIONS 3, 6, 7, & 8: 15
IN GENERAL, HOW WOULD YOU RATE YOUR MENTAL HEALTH, INCLUDING YOUR MOOD AND YOUR ABILITY TO THINK [ON A SCALE OF 1 (POOR) TO 5 (EXCELLENT)]?: 3
SUM OF RESPONSES TO QUESTIONS 2, 4, 5, & 10: 14
TO WHAT EXTENT ARE YOU ABLE TO CARRY OUT YOUR EVERYDAY PHYSICAL ACTIVITIES SUCH AS WALKING, CLIMBING STAIRS, CARRYING GROCERIES, OR MOVING A CHAIR [ON A SCALE OF 1 (NOT AT ALL) TO 5 (COMPLETELY)]?: 5

## 2023-12-13 ASSESSMENT — ENCOUNTER SYMPTOMS
SORE THROAT: 0
BACK PAIN: 1
GASTROINTESTINAL NEGATIVE: 1
COUGH: 1
EYE REDNESS: 0
DIARRHEA: 0
BLOOD IN STOOL: 0
EYES NEGATIVE: 1
EYE DISCHARGE: 0
VOMITING: 0
CONSTIPATION: 0
NAUSEA: 0
EYE PAIN: 0
WHEEZING: 0
SHORTNESS OF BREATH: 0
ABDOMINAL PAIN: 0

## 2023-12-15 LAB
ACUTE LEUKEMIA MARKERS SPEC-IMP: NORMAL
EVENTS COUNTED SPEC: 26 MARKERS
PROF LEUK/LYMPH PHENO 16 OR MORE MARKERS: NORMAL
PROF LEUK/LYMPH PHENO 26 MARKERS: NORMAL
SOURCE: NORMAL

## 2024-01-08 ENCOUNTER — HOSPITAL ENCOUNTER (OUTPATIENT)
Dept: GENERAL RADIOLOGY | Facility: HOSPITAL | Age: 54
Discharge: HOME | End: 2024-01-08
Admitting: ORTHOPAEDIC SURGERY
Payer: COMMERCIAL

## 2024-01-08 ENCOUNTER — TRANSCRIBE ORDERS (OUTPATIENT)
Dept: GENERAL RADIOLOGY | Facility: HOSPITAL | Age: 54
End: 2024-01-08
Payer: COMMERCIAL

## 2024-01-08 DIAGNOSIS — M54.50 LOW BACK PAIN, UNSPECIFIED BACK PAIN LATERALITY, UNSPECIFIED CHRONICITY, UNSPECIFIED WHETHER SCIATICA PRESENT: ICD-10-CM

## 2024-01-08 DIAGNOSIS — M54.50 LOW BACK PAIN, UNSPECIFIED BACK PAIN LATERALITY, UNSPECIFIED CHRONICITY, UNSPECIFIED WHETHER SCIATICA PRESENT: Primary | ICD-10-CM

## 2024-01-08 DIAGNOSIS — M54.16 LUMBAR RADICULOPATHY: ICD-10-CM

## 2024-01-08 PROCEDURE — 72110 X-RAY EXAM L-2 SPINE 4/>VWS: CPT

## 2024-01-23 ENCOUNTER — TELEPHONE (OUTPATIENT)
Dept: HEMATOLOGY | Age: 54
End: 2024-01-23

## 2024-01-23 DIAGNOSIS — D72.828 OTHER ELEVATED WHITE BLOOD CELL (WBC) COUNT: Primary | ICD-10-CM

## 2024-01-23 NOTE — TELEPHONE ENCOUNTER
Called pt to remind them of their appt on 1/23/2024 but was unable to leave message or speak to the patient due to invalid number or number was disconnected.

## 2024-01-23 NOTE — PROGRESS NOTES
Progress Note      Pt Name: Jeremiah Forrest  YOB: 1970  MRN: [id number]    Date of evaluation: 1/24/2024  History Obtained From:  patient, electronic medical record    CHIEF COMPLAINT:    Chief Complaint   Patient presents with    Follow-up     Intermittent left lower quadrant abdominal pain     HISTORY OF PRESENT ILLNESS:    Jeremiah Forrest is a 53 y.o.  male who returns today in 6-week follow-up for further evaluation and recommendations related to chronic leukocytosis suspected to be associated with chronic inflammatory process and tobacco use.  Serology studies on 12/13/2023 confirmed a elevated sed rate and CRP.  Flow cytometry was within normal limits suggesting no myeloid disorder.  Unfortunately peripheral blood smear was not completed at initial consultation, will request today.  Hemoglobin and platelet count remain within normal limits.  Jeremiah reports he continues to have some night sweats although are not as drenching as at initial presentation in December 2023.  He has no new complaints and WBC is stable at 12.53 with an ANC of 8.67, lymphocytes 24.4%.    Today's clinic visit to include physical assessment, review of systems, any lab or radiographic findings that were available and plan of care are documented below.    HEMATOLOGIC HISTORY:   Diagnosis:  Leukocytosis suspect secondary to chronic inflammatory process and tobacco use    Treatment summary:  Conservative monitoring    Jeremiah was seen in initial hematology consultation on 12/13/2023 for leukocytosis, referred by NOEMI Yanez for evaluation and treatment recommendations.  Jeremiah has a medical history to include chronic back and neck pain, arthritis, chronic obstructive pulmonary disease, and tobacco use.  He presented with complaints of allergy symptoms (runny nose and sneezing), chronic fatigue and drenching night sweats that occur intermittently over the past 6 months.  He denied any unintentional

## 2024-01-24 ENCOUNTER — OFFICE VISIT (OUTPATIENT)
Dept: HEMATOLOGY | Age: 54
End: 2024-01-24
Payer: COMMERCIAL

## 2024-01-24 ENCOUNTER — HOSPITAL ENCOUNTER (OUTPATIENT)
Dept: INFUSION THERAPY | Age: 54
Discharge: HOME | End: 2024-01-24
Payer: COMMERCIAL

## 2024-01-24 VITALS
WEIGHT: 192 LBS | HEIGHT: 70 IN | BODY MASS INDEX: 27.49 KG/M2 | DIASTOLIC BLOOD PRESSURE: 76 MMHG | SYSTOLIC BLOOD PRESSURE: 124 MMHG | HEART RATE: 86 BPM | OXYGEN SATURATION: 96 % | TEMPERATURE: 98.6 F

## 2024-01-24 DIAGNOSIS — Z72.0 TOBACCO USE: ICD-10-CM

## 2024-01-24 DIAGNOSIS — M54.2 CHRONIC NECK PAIN: ICD-10-CM

## 2024-01-24 DIAGNOSIS — D72.828 OTHER ELEVATED WHITE BLOOD CELL (WBC) COUNT: ICD-10-CM

## 2024-01-24 DIAGNOSIS — D72.828 OTHER ELEVATED WHITE BLOOD CELL (WBC) COUNT: Primary | ICD-10-CM

## 2024-01-24 DIAGNOSIS — G89.29 CHRONIC NECK PAIN: ICD-10-CM

## 2024-01-24 LAB
ALBUMIN SERPL-MCNC: 4 G/DL (ref 3.5–5.2)
ALP SERPL-CCNC: 83 U/L (ref 40–130)
ALT SERPL-CCNC: 22 U/L (ref 21–72)
ANION GAP SERPL CALCULATED.3IONS-SCNC: 13 MMOL/L (ref 7–19)
AST SERPL-CCNC: 24 U/L (ref 17–59)
BASOPHILS # BLD: 0.04 K/UL (ref 0.01–0.08)
BASOPHILS NFR BLD: 0.3 % (ref 0.1–1.2)
BILIRUB SERPL-MCNC: 0.5 MG/DL (ref 0.2–1.3)
BUN SERPL-MCNC: 8 MG/DL (ref 9–20)
CALCIUM SERPL-MCNC: 8.4 MG/DL (ref 8.4–10.2)
CHLORIDE SERPL-SCNC: 100 MMOL/L (ref 98–111)
CO2 SERPL-SCNC: 26 MMOL/L (ref 22–29)
CREAT SERPL-MCNC: 0.8 MG/DL (ref 0.6–1.2)
CRP SERPL HS-MCNC: 1.19 MG/DL (ref 0–0.5)
EOSINOPHIL # BLD: 0.07 K/UL (ref 0.04–0.54)
EOSINOPHIL NFR BLD: 0.6 % (ref 0.7–7)
ERYTHROCYTE [DISTWIDTH] IN BLOOD BY AUTOMATED COUNT: 12.8 % (ref 11.6–14.4)
ERYTHROCYTE [SEDIMENTATION RATE] IN BLOOD BY WESTERGREN METHOD: 10 MM/HR (ref 0–15)
GLOBULIN: 3.2 G/DL
GLUCOSE SERPL-MCNC: 93 MG/DL (ref 74–106)
HCT VFR BLD AUTO: 43.7 % (ref 40.1–51)
HCT VFR BLD AUTO: 43.7 % (ref 40.1–51)
HGB BLD-MCNC: 14.8 G/DL (ref 13.7–17.5)
LYMPHOCYTES # BLD: 3.06 K/UL (ref 1.18–3.74)
LYMPHOCYTES NFR BLD: 24.4 % (ref 19.3–53.1)
MCH RBC QN AUTO: 30.6 PG (ref 25.7–32.2)
MCHC RBC AUTO-ENTMCNC: 33.9 G/DL (ref 32.3–36.5)
MCV RBC AUTO: 90.3 FL (ref 79–92.2)
MONOCYTES # BLD: 0.65 K/UL (ref 0.24–0.82)
MONOCYTES NFR BLD: 5.2 % (ref 4.7–12.5)
NEUTROPHILS # BLD: 8.67 K/UL (ref 1.56–6.13)
NEUTS SEG NFR BLD: 69.2 % (ref 34–71.1)
PLATELET # BLD AUTO: 279 K/UL (ref 163–337)
PMV BLD AUTO: 9.1 FL (ref 7.4–10.4)
POTASSIUM SERPL-SCNC: 4.1 MMOL/L (ref 3.5–5.1)
PROT SERPL-MCNC: 7.1 G/DL (ref 6.3–8.2)
RBC # BLD AUTO: 4.84 M/UL (ref 4.63–6.08)
RETICS # AUTO: 0.08 M/UL (ref 0.03–0.12)
RETICS/RBC NFR: 1.67 % (ref 0.5–1.5)
SODIUM SERPL-SCNC: 139 MMOL/L (ref 137–145)
WBC # BLD AUTO: 12.53 K/UL (ref 4.23–9.07)

## 2024-01-24 PROCEDURE — 4004F PT TOBACCO SCREEN RCVD TLK: CPT | Performed by: NURSE PRACTITIONER

## 2024-01-24 PROCEDURE — 99213 OFFICE O/P EST LOW 20 MIN: CPT | Performed by: NURSE PRACTITIONER

## 2024-01-24 PROCEDURE — G8484 FLU IMMUNIZE NO ADMIN: HCPCS | Performed by: NURSE PRACTITIONER

## 2024-01-24 PROCEDURE — 80053 COMPREHEN METABOLIC PANEL: CPT

## 2024-01-24 PROCEDURE — 85025 COMPLETE CBC W/AUTO DIFF WBC: CPT

## 2024-01-24 PROCEDURE — 85045 AUTOMATED RETICULOCYTE COUNT: CPT

## 2024-01-24 PROCEDURE — 3017F COLORECTAL CA SCREEN DOC REV: CPT | Performed by: NURSE PRACTITIONER

## 2024-01-24 PROCEDURE — G8419 CALC BMI OUT NRM PARAM NOF/U: HCPCS | Performed by: NURSE PRACTITIONER

## 2024-01-24 PROCEDURE — 36415 COLL VENOUS BLD VENIPUNCTURE: CPT

## 2024-01-24 PROCEDURE — G8427 DOCREV CUR MEDS BY ELIG CLIN: HCPCS | Performed by: NURSE PRACTITIONER

## 2024-01-24 PROCEDURE — 99212 OFFICE O/P EST SF 10 MIN: CPT

## 2024-01-24 ASSESSMENT — ENCOUNTER SYMPTOMS
GASTROINTESTINAL NEGATIVE: 1
BACK PAIN: 1
SORE THROAT: 0
COUGH: 0
RESPIRATORY NEGATIVE: 1
EYE PAIN: 0
WHEEZING: 0
SHORTNESS OF BREATH: 0
EYES NEGATIVE: 1
NAUSEA: 0
DIARRHEA: 0
EYE REDNESS: 0
EYE DISCHARGE: 0
BLOOD IN STOOL: 0
VOMITING: 0
ABDOMINAL PAIN: 0
CONSTIPATION: 0

## 2024-01-29 LAB — BLOOD SMEAR REVIEW: NORMAL

## 2024-08-27 ENCOUNTER — TELEPHONE (OUTPATIENT)
Dept: HEMATOLOGY | Age: 54
End: 2024-08-27

## 2024-08-27 NOTE — TELEPHONE ENCOUNTER
Called Patient and reminded patient of their appointment on 08/30/2024 and patient confirmed they would be here. Reminded patient to just come at appointment time, and to not come at the lab appointment time. Reminded patient that we will not check them in any more than 30 minutes before appointment time.  We have now moved to the [facility] [place] that is located between our old office and the ER at the [facility]

## 2024-08-30 DIAGNOSIS — D72.828 OTHER ELEVATED WHITE BLOOD CELL (WBC) COUNT: Primary | ICD-10-CM

## 2024-10-24 ENCOUNTER — TELEPHONE (OUTPATIENT)
Dept: HEMATOLOGY | Age: 54
End: 2024-10-24

## 2024-10-24 NOTE — TELEPHONE ENCOUNTER
Called Patient and reminded patient of their appointment on 10/28/2024 and patient confirmed they would be here. Reminded patient to just come at appointment time, and to not come at the lab appointment time. Reminded patient that we will not check them in any more than 30 minutes before appointment time.  We have now moved to the [facility] cancer center that is located between our old office and the ER at the [facility]. Letting the Pt know that our front entrance faces the  's [place].

## 2024-12-02 ENCOUNTER — OFFICE VISIT (OUTPATIENT)
Dept: FAMILY MEDICINE CLINIC | Age: 54
End: 2024-12-02

## 2024-12-02 VITALS
OXYGEN SATURATION: 96 % | HEART RATE: 97 BPM | WEIGHT: 195 LBS | TEMPERATURE: 98.4 F | DIASTOLIC BLOOD PRESSURE: 72 MMHG | HEIGHT: 70 IN | BODY MASS INDEX: 27.92 KG/M2 | SYSTOLIC BLOOD PRESSURE: 118 MMHG

## 2024-12-02 DIAGNOSIS — J01.90 ACUTE SINUSITIS, RECURRENCE NOT SPECIFIED, UNSPECIFIED LOCATION: ICD-10-CM

## 2024-12-02 DIAGNOSIS — R50.9 FEVER, UNSPECIFIED FEVER CAUSE: Primary | ICD-10-CM

## 2024-12-02 LAB
INFLUENZA A ANTIGEN, POC: NEGATIVE
INFLUENZA B ANTIGEN, POC: NEGATIVE
LOT EXPIRE DATE: 0
LOT KIT NUMBER: 0
SARS-COV-2 RNA, POC: NEGATIVE
VALID INTERNAL CONTROL: 0
VENDOR AND KIT NAME POC: NORMAL

## 2024-12-02 RX ORDER — PREDNISONE 20 MG/1
20 TABLET ORAL 2 TIMES DAILY
Qty: 10 TABLET | Refills: 0 | Status: SHIPPED | OUTPATIENT
Start: 2024-12-02 | End: 2024-12-07

## 2024-12-02 RX ORDER — AZITHROMYCIN 250 MG/1
TABLET, FILM COATED ORAL
Qty: 6 TABLET | Refills: 0 | Status: SHIPPED | OUTPATIENT
Start: 2024-12-02 | End: 2024-12-12

## 2024-12-02 SDOH — ECONOMIC STABILITY: FOOD INSECURITY: WITHIN THE PAST 12 MONTHS, THE FOOD YOU BOUGHT JUST DIDN'T LAST AND YOU DIDN'T HAVE MONEY TO GET MORE.: NEVER TRUE

## 2024-12-02 SDOH — ECONOMIC STABILITY: FOOD INSECURITY: WITHIN THE PAST 12 MONTHS, YOU WORRIED THAT YOUR FOOD WOULD RUN OUT BEFORE YOU GOT MONEY TO BUY MORE.: NEVER TRUE

## 2024-12-02 SDOH — ECONOMIC STABILITY: INCOME INSECURITY: HOW HARD IS IT FOR YOU TO PAY FOR THE VERY BASICS LIKE FOOD, HOUSING, MEDICAL CARE, AND HEATING?: NOT HARD AT ALL

## 2024-12-02 ASSESSMENT — ENCOUNTER SYMPTOMS
COUGH: 1
SINUS PRESSURE: 1
SORE THROAT: 0

## 2024-12-02 ASSESSMENT — PATIENT HEALTH QUESTIONNAIRE - PHQ9
1. LITTLE INTEREST OR PLEASURE IN DOING THINGS: NOT AT ALL
SUM OF ALL RESPONSES TO PHQ QUESTIONS 1-9: 0
2. FEELING DOWN, DEPRESSED OR HOPELESS: NOT AT ALL
SUM OF ALL RESPONSES TO PHQ QUESTIONS 1-9: 0
SUM OF ALL RESPONSES TO PHQ9 QUESTIONS 1 & 2: 0

## 2024-12-02 NOTE — PROGRESS NOTES
SUBJECTIVE:  Sick   Patient ID: Jeremiah Forrest is a 54 y.o. male.    HPI:   Ear Pain   Associated symptoms include coughing. Pertinent negatives include no headaches or sore throat.   Cough  Associated symptoms include ear pain, a fever and postnasal drip. Pertinent negatives include no headaches, myalgias or sore throat.   Fever   Associated symptoms include congestion, coughing and ear pain. Pertinent negatives include no headaches or sore throat.      Slight fever lots of congestion and ear pain with sinus pain  Temperature taking tylenol   Started Friday   Sore throat.   No headache  Took tylenol an hour ago   Teeth pain   Smoker   Past Medical History:   Diagnosis Date    Arthritis     Chronic back pain     sciatica    COPD (chronic obstructive pulmonary disease) ([facility])     Neck pain     HX vertebral fx with hardware      Prior to Visit Medications    Medication Sig Taking? Authorizing Provider   azithromycin (ZITHROMAX) 250 MG tablet 500mg on day 1 followed by 250mg on days 2 - 5 Yes Elaine Colon APRN   predniSONE (DELTASONE) 20 MG tablet Take 1 tablet by mouth 2 times daily for 5 days Yes Elaine Colon APRN   tiZANidine (ZANAFLEX) 4 MG tablet Take 1 tablet by mouth 3 times daily as needed (pain/muscle spasm) Yes Sandhya Haji APRN   acetaminophen (TYLENOL) 325 MG tablet Take 2 tablets by mouth every 6 hours as needed for Pain Yes Provider, Kojo, MD     Allergies   Allergen Reactions    Pcn [Penicillins]      States his Mother told him       Review of Systems   Constitutional:  Positive for fatigue and fever.   HENT:  Positive for congestion, ear pain, postnasal drip and sinus pressure. Negative for sore throat.    Respiratory:  Positive for cough.    Musculoskeletal:  Negative for myalgias.   Neurological:  Negative for headaches.       OBJECTIVE:    Physical Exam  Constitutional:       Appearance: He is well-developed.   HENT:      Head: Normocephalic and atraumatic.

## 2025-01-27 ENCOUNTER — TELEPHONE (OUTPATIENT)
Dept: HEMATOLOGY | Age: 55
End: 2025-01-27

## 2025-01-27 NOTE — TELEPHONE ENCOUNTER

## 2025-01-28 DIAGNOSIS — D72.828 OTHER ELEVATED WHITE BLOOD CELL (WBC) COUNT: Primary | ICD-10-CM

## 2025-01-29 ENCOUNTER — OFFICE VISIT (OUTPATIENT)
Dept: HEMATOLOGY | Age: 55
End: 2025-01-29
Payer: COMMERCIAL

## 2025-01-29 ENCOUNTER — HOSPITAL ENCOUNTER (OUTPATIENT)
Dept: INFUSION THERAPY | Age: 55
Discharge: HOME | End: 2025-01-29
Payer: COMMERCIAL

## 2025-01-29 VITALS
BODY MASS INDEX: 28.05 KG/M2 | TEMPERATURE: 97.9 F | SYSTOLIC BLOOD PRESSURE: 136 MMHG | WEIGHT: 195.9 LBS | HEIGHT: 70 IN | DIASTOLIC BLOOD PRESSURE: 82 MMHG | HEART RATE: 89 BPM | OXYGEN SATURATION: 93 %

## 2025-01-29 DIAGNOSIS — G89.29 CHRONIC NECK PAIN: ICD-10-CM

## 2025-01-29 DIAGNOSIS — M54.2 CHRONIC NECK PAIN: ICD-10-CM

## 2025-01-29 DIAGNOSIS — Z72.0 TOBACCO USE: ICD-10-CM

## 2025-01-29 DIAGNOSIS — D72.828 OTHER ELEVATED WHITE BLOOD CELL (WBC) COUNT: ICD-10-CM

## 2025-01-29 DIAGNOSIS — D72.828 OTHER ELEVATED WHITE BLOOD CELL (WBC) COUNT: Primary | ICD-10-CM

## 2025-01-29 DIAGNOSIS — R79.82 ELEVATED C-REACTIVE PROTEIN (CRP): ICD-10-CM

## 2025-01-29 LAB
ALBUMIN SERPL-MCNC: 4.1 G/DL (ref 3.5–5.2)
ALP SERPL-CCNC: 85 U/L (ref 40–129)
ALT SERPL-CCNC: 23 U/L (ref 5–41)
ANION GAP SERPL CALCULATED.3IONS-SCNC: 11 MMOL/L (ref 7–19)
AST SERPL-CCNC: 22 U/L (ref 5–40)
BASOPHILS # BLD: 0.04 K/UL (ref 0–0.2)
BASOPHILS NFR BLD: 0.2 % (ref 0–1)
BILIRUB SERPL-MCNC: 0.4 MG/DL (ref 0–1.2)
BUN SERPL-MCNC: 8 MG/DL (ref 6–20)
CALCIUM SERPL-MCNC: 9.4 MG/DL (ref 8.6–10)
CHLORIDE SERPL-SCNC: 103 MMOL/L (ref 98–107)
CO2 SERPL-SCNC: 25 MMOL/L (ref 22–29)
CREAT SERPL-MCNC: 0.9 MG/DL (ref 0.7–1.2)
CRP SERPL HS-MCNC: 0.77 MG/DL (ref 0–0.5)
EOSINOPHIL # BLD: 0.09 K/UL (ref 0–0.6)
EOSINOPHIL NFR BLD: 0.6 % (ref 0–5)
ERYTHROCYTE [DISTWIDTH] IN BLOOD BY AUTOMATED COUNT: 12.8 % (ref 11.5–14.5)
ERYTHROCYTE [SEDIMENTATION RATE] IN BLOOD BY WESTERGREN METHOD: 8 MM/HR (ref 0–15)
GLUCOSE SERPL-MCNC: 99 MG/DL (ref 70–99)
HCT VFR BLD AUTO: 46.6 % (ref 42–52)
HGB BLD-MCNC: 16 G/DL (ref 14–18)
LYMPHOCYTES # BLD: 3.44 K/UL (ref 1.1–4.5)
LYMPHOCYTES NFR BLD: 21.3 % (ref 20–40)
MCH RBC QN AUTO: 30.6 PG (ref 27–31)
MCHC RBC AUTO-ENTMCNC: 34.3 G/DL (ref 33–37)
MCV RBC AUTO: 89.1 FL (ref 80–94)
MONOCYTES # BLD: 1.15 K/UL (ref 0–0.9)
MONOCYTES NFR BLD: 7.1 % (ref 1–10)
NEUTROPHILS # BLD: 11.16 K/UL (ref 1.5–7.5)
NEUTS SEG NFR BLD: 69.1 % (ref 50–65)
PLATELET # BLD AUTO: 281 K/UL (ref 130–400)
PMV BLD AUTO: 9.4 FL (ref 9.4–12.4)
POTASSIUM SERPL-SCNC: 4 MMOL/L (ref 3.5–5.1)
PROT SERPL-MCNC: 7.2 G/DL (ref 6.4–8.3)
RBC # BLD AUTO: 5.23 M/UL (ref 4.7–6.1)
SODIUM SERPL-SCNC: 139 MMOL/L (ref 136–145)
WBC # BLD AUTO: 16.15 K/UL (ref 4.8–10.8)

## 2025-01-29 PROCEDURE — 80053 COMPREHEN METABOLIC PANEL: CPT

## 2025-01-29 PROCEDURE — G8419 CALC BMI OUT NRM PARAM NOF/U: HCPCS | Performed by: NURSE PRACTITIONER

## 2025-01-29 PROCEDURE — 99213 OFFICE O/P EST LOW 20 MIN: CPT

## 2025-01-29 PROCEDURE — 99214 OFFICE O/P EST MOD 30 MIN: CPT | Performed by: NURSE PRACTITIONER

## 2025-01-29 PROCEDURE — 4004F PT TOBACCO SCREEN RCVD TLK: CPT | Performed by: NURSE PRACTITIONER

## 2025-01-29 PROCEDURE — 85025 COMPLETE CBC W/AUTO DIFF WBC: CPT

## 2025-01-29 PROCEDURE — G8427 DOCREV CUR MEDS BY ELIG CLIN: HCPCS | Performed by: NURSE PRACTITIONER

## 2025-01-29 PROCEDURE — 3017F COLORECTAL CA SCREEN DOC REV: CPT | Performed by: NURSE PRACTITIONER

## 2025-01-29 PROCEDURE — 36415 COLL VENOUS BLD VENIPUNCTURE: CPT

## 2025-01-29 ASSESSMENT — ENCOUNTER SYMPTOMS
BLOOD IN STOOL: 0
EYE REDNESS: 0
EYE PAIN: 0
NAUSEA: 0
VOMITING: 0
RESPIRATORY NEGATIVE: 1
BACK PAIN: 1
COUGH: 0
CONSTIPATION: 0
GASTROINTESTINAL NEGATIVE: 1
SORE THROAT: 0
DIARRHEA: 0
ABDOMINAL PAIN: 0
SHORTNESS OF BREATH: 0
EYES NEGATIVE: 1
EYE DISCHARGE: 0
WHEEZING: 0

## 2025-07-02 NOTE — PROGRESS NOTES
[facility]  [address]  Dept: [phone]  Dept Fax: [phone]    Visit type: Established patient    Reason for Visit: Other (abnormal EKG at work physical; scanned to media ) and Referral - General (neuro; wants second opinion on neck pain)         Assessment and Plan       1. Neck pain  -     External Referral To Orthopedic Surgery  2. Foraminal stenosis of cervical region  -     External Referral To Orthopedic Surgery  3. Cervical stenosis of spinal canal  -     External Referral To Orthopedic Surgery  4. DDD (degenerative disc disease), cervical  -     External Referral To Orthopedic Surgery  5. Left arm pain  -     External Referral To Orthopedic Surgery  6. Numbness and tingling in left hand  -     External Referral To Orthopedic Surgery  7. Myofascial pain syndrome  -     cyclobenzaprine (FLEXERIL) 10 MG tablet; Take 1 tablet by mouth 2 times daily as needed for Muscle spasms, Disp-60 tablet, R-2Normal    With patient symptoms and desires to seek second opinion we will get him set up for evaluation recommendations. Discussed proper use of medication and lifestyle modifications that may beneficial.  We will continue to monitor and assist as course dictates. Discussed signs and require medical attention. All questions were answered and patient voiced understanding and agreement with plan as discussed. Return if symptoms worsen or fail to improve, for next scheduled follow up with PCP. Subjective       HPI   Had an abnormal EKG a work and does already have an appointment with cardiology but just wanted to get the EKG's scanned in. He josemanuel with him some old ekg's with similar findings. He states that he has been having chronic neck pain that also seems to be effecting his shoulder and arms as well. He has seen a neurosurgeon with the feelings that surgery is not really something that would help.  He states that he doesn't want to have surgery for no Musculoskeletal:         General: No deformity (No gross deformities of upper or lower extremities) or signs of injury. Cervical back: Neck supple. No muscular tenderness. Right lower leg: No edema. Left lower leg: No edema. Lymphadenopathy:      Cervical: No cervical adenopathy. Skin:     General: Skin is warm and dry. Findings: No erythema or rash. Neurological:      Mental Status: He is alert and oriented to person, place, and time. Cranial Nerves: No cranial nerve deficit. Psychiatric:         Behavior: Behavior normal.         Thought Content:  Thought content normal.           Data Reviewed and Summarized       Labs:     Imaging/Testing:        Jaz Connell MD R inner heel

## (undated) DEVICE — SUTURE PERMAHAND SZ 2-0 L30IN NONABSORBABLE BLK SILK W/O A305H

## (undated) DEVICE — PAD,NON-ADHERENT,3X8,STERILE,LF,1/PK: Brand: MEDLINE

## (undated) DEVICE — NEEDLE SPNL 20GA L3 1 2IN YEL S STL POLYCARB HUB MTL STYL

## (undated) DEVICE — SPONGE SURG WHT KTNR DISECT RADPQ ST

## (undated) DEVICE — DISCONTINUED USE 408103 COLLAR CERVICLE TRAC UNIVERSAL

## (undated) DEVICE — E-Z CLEAN, NON-STICK, PTFE COATED, ELECTROSURGICAL BLADE ELECTRODE, MODIFIED EXTENDED INSULATION, 2.5 INCH (6.35 CM): Brand: MEGADYNE

## (undated) DEVICE — PEN: MARKING STD 100/CS: Brand: MEDICAL ACTION INDUSTRIES

## (undated) DEVICE — MASK ANES AD M SZ 5 PREM TAIL VLV INFL PRT UNSCENTED HK RNG

## (undated) DEVICE — STANDARD HYPODERMIC NEEDLE,POLYPROPYLENE HUB: Brand: MONOJECT

## (undated) DEVICE — SHEET,T,THYROID,STERILE: Brand: MEDLINE

## (undated) DEVICE — ASTOUND STANDARD SURGICAL GOWN, XL: Brand: CONVERTORS

## (undated) DEVICE — NEURO CDS

## (undated) DEVICE — TOWEL,OR,DSP,ST,BLUE,DLX,4/PK,20PK/CS: Brand: MEDLINE

## (undated) DEVICE — DRILL BIT 7080510 11 MM DRILL BIT S

## (undated) DEVICE — CHLORAPREP 26ML ORANGE

## (undated) DEVICE — STERILE LATEX POWDER FREE SURGICAL GLOVES WITH HYDROGEL COATING: Brand: PROTEXIS

## (undated) DEVICE — SUTURE VCRL SZ 3-0 L27IN ABSRB UD L26MM SH 1/2 CIR J416H

## (undated) DEVICE — NEEDLE SPNL 22GA L3.5IN BLK HUB S STL REG WALL FIT STYL W/

## (undated) DEVICE — MAJOR BSIN SETUP PK

## (undated) DEVICE — SUTURE VCRL SZ 3-0 L18IN ABSRB UD L26MM SH 1/2 CIR J864D

## (undated) DEVICE — CONMED GOLDLINE ELECTROSURGICAL HANDPIECE, HAND CONTROLLED WITH BLADE ELECTRODE, BUTTON SWITCH, SAFETY HOLSTER AND 10 FT (3 M) CABLE: Brand: CONMED GOLDLINE

## (undated) DEVICE — 3M™ STERI-STRIP™ REINFORCED ADHESIVE SKIN CLOSURES, R1547, 1/2 IN X 4 IN (12 MM X 100 MM), 6 STRIPS/ENVELOPE: Brand: 3M™ STERI-STRIP™

## (undated) DEVICE — FORCEP BPLR IRIS

## (undated) DEVICE — FORCEPS BX 240CM 2.4MM L NDL RAD JAW 4 M00513334

## (undated) DEVICE — JACKSON-PRATT 100CC BULB RESERVOIR: Brand: CARDINAL HEALTH

## (undated) DEVICE — MICRO KOVER: Brand: UNBRANDED

## (undated) DEVICE — ENDO KIT,LOURDES HOSPITAL: Brand: MEDLINE INDUSTRIES, INC.

## (undated) DEVICE — AIRWAY CIRCUIT: Brand: DEROYAL

## (undated) DEVICE — SUTURE MCRYL SZ 4-0 L18IN ABSRB UD L19MM PS-2 3/8 CIR PRIM Y496G

## (undated) DEVICE — MASTISOL ADHESIVE LIQ 2/3ML

## (undated) DEVICE — SKIN MARKER,REGULAR TIP WITH RULER: Brand: DEVON

## (undated) DEVICE — AMBU AURAONCE U SIZE 4: Brand: AURAONCE

## (undated) DEVICE — STANDARD SURGICAL GOWN, L: Brand: CONVERTORS

## (undated) DEVICE — KIT URIN CATHETER 16 FR 5 CC M INDWL SIL

## (undated) DEVICE — DRAIN SURG 10FR L1/8IN DIA3.2MM SIL CHN RND HUBLESS FULL

## (undated) DEVICE — 9165 UNIVERSAL PATIENT PLATE: Brand: 3M™

## (undated) DEVICE — TOOL T12MH25L LGD 12CM 2.5MM MATCH LG: Brand: MIDAS REX®

## (undated) DEVICE — BAG BND W36XL36IN TRNSPAR POLY GEN PURP W E BND CLSR TIDI

## (undated) DEVICE — C-ARMOR C-ARM EQUIPMENT COVERS CLEAR STERILE UNIVERSAL FIT 12 PER CASE: Brand: C-ARMOR